# Patient Record
Sex: MALE | Race: WHITE | NOT HISPANIC OR LATINO | Employment: OTHER | URBAN - METROPOLITAN AREA
[De-identification: names, ages, dates, MRNs, and addresses within clinical notes are randomized per-mention and may not be internally consistent; named-entity substitution may affect disease eponyms.]

---

## 2017-02-20 ENCOUNTER — TRANSCRIBE ORDERS (OUTPATIENT)
Dept: ADMINISTRATIVE | Facility: HOSPITAL | Age: 62
End: 2017-02-20

## 2017-02-20 DIAGNOSIS — C45.7: Primary | ICD-10-CM

## 2017-06-09 ENCOUNTER — ALLSCRIPTS OFFICE VISIT (OUTPATIENT)
Dept: OTHER | Facility: OTHER | Age: 62
End: 2017-06-09

## 2017-07-05 ENCOUNTER — GENERIC CONVERSION - ENCOUNTER (OUTPATIENT)
Dept: OTHER | Facility: OTHER | Age: 62
End: 2017-07-05

## 2017-11-01 DIAGNOSIS — E11.9 TYPE 2 DIABETES MELLITUS WITHOUT COMPLICATIONS (HCC): ICD-10-CM

## 2017-11-07 ENCOUNTER — ALLSCRIPTS OFFICE VISIT (OUTPATIENT)
Dept: OTHER | Facility: OTHER | Age: 62
End: 2017-11-07

## 2018-01-12 VITALS
BODY MASS INDEX: 27.4 KG/M2 | HEART RATE: 81 BPM | OXYGEN SATURATION: 98 % | DIASTOLIC BLOOD PRESSURE: 84 MMHG | RESPIRATION RATE: 16 BRPM | SYSTOLIC BLOOD PRESSURE: 150 MMHG | WEIGHT: 185 LBS | HEIGHT: 69 IN

## 2018-01-15 VITALS
TEMPERATURE: 98.6 F | DIASTOLIC BLOOD PRESSURE: 80 MMHG | HEART RATE: 83 BPM | RESPIRATION RATE: 18 BRPM | BODY MASS INDEX: 27.87 KG/M2 | OXYGEN SATURATION: 96 % | SYSTOLIC BLOOD PRESSURE: 126 MMHG | WEIGHT: 186 LBS

## 2018-03-22 PROCEDURE — 3072F LOW RISK FOR RETINOPATHY: CPT | Performed by: INTERNAL MEDICINE

## 2018-05-07 DIAGNOSIS — E11.9 TYPE 2 DIABETES MELLITUS WITHOUT COMPLICATIONS (HCC): ICD-10-CM

## 2018-05-11 DIAGNOSIS — E78.5 HYPERLIPIDEMIA, UNSPECIFIED HYPERLIPIDEMIA TYPE: Primary | ICD-10-CM

## 2018-05-11 RX ORDER — LISINOPRIL 30 MG/1
1 TABLET ORAL DAILY
COMMUNITY
Start: 2014-07-23 | End: 2018-06-26 | Stop reason: SDUPTHER

## 2018-05-11 RX ORDER — DOXYCYCLINE HYCLATE 100 MG
1 TABLET ORAL 2 TIMES DAILY
COMMUNITY
Start: 2017-11-07 | End: 2018-06-12 | Stop reason: ALTCHOICE

## 2018-05-11 RX ORDER — AMLODIPINE BESYLATE 10 MG/1
0.5 TABLET ORAL 2 TIMES DAILY
COMMUNITY
Start: 2013-05-11 | End: 2018-07-05 | Stop reason: SDUPTHER

## 2018-05-11 RX ORDER — BIOTIN 1 MG
1 TABLET ORAL DAILY
COMMUNITY
Start: 2015-04-21

## 2018-05-11 RX ORDER — HYDROCHLOROTHIAZIDE 12.5 MG/1
1 CAPSULE, GELATIN COATED ORAL DAILY
COMMUNITY
Start: 2012-04-30 | End: 2018-06-26 | Stop reason: SDUPTHER

## 2018-05-11 RX ORDER — FENOFIBRATE 145 MG/1
1 TABLET, COATED ORAL DAILY
COMMUNITY
Start: 2012-02-07 | End: 2018-05-11 | Stop reason: SDUPTHER

## 2018-05-11 RX ORDER — CHLORAL HYDRATE 500 MG
3 CAPSULE ORAL DAILY
COMMUNITY

## 2018-05-11 RX ORDER — B-COMPLEX WITH VITAMIN C
1 TABLET ORAL DAILY
COMMUNITY

## 2018-05-11 RX ORDER — MULTIVITAMIN
1 TABLET ORAL DAILY
COMMUNITY

## 2018-05-13 RX ORDER — FENOFIBRATE 145 MG/1
145 TABLET, COATED ORAL DAILY
Qty: 30 TABLET | Refills: 5 | Status: SHIPPED | OUTPATIENT
Start: 2018-05-13 | End: 2019-01-08 | Stop reason: SDUPTHER

## 2018-05-14 ENCOUNTER — TELEPHONE (OUTPATIENT)
Dept: INTERNAL MEDICINE CLINIC | Facility: CLINIC | Age: 63
End: 2018-05-14

## 2018-05-14 NOTE — TELEPHONE ENCOUNTER
Patient is calling to speak with you  He wouldn't give any details and only wants to talk to you      Please advise

## 2018-05-14 NOTE — TELEPHONE ENCOUNTER
Attempted to call the pt , mailbox is full unable to leave a message ;  Shailesh Kelly please call pt and get the pt on the phone

## 2018-05-16 NOTE — TELEPHONE ENCOUNTER
Spoke to patient  He was inquiring on whether or not the Fenofibrate was filled  I advised that this prescription has been filled and it is ready at the pharmacy  [facility] confirmed

## 2018-06-12 ENCOUNTER — OFFICE VISIT (OUTPATIENT)
Dept: INTERNAL MEDICINE CLINIC | Facility: CLINIC | Age: 63
End: 2018-06-12
Payer: COMMERCIAL

## 2018-06-12 VITALS
HEART RATE: 79 BPM | SYSTOLIC BLOOD PRESSURE: 144 MMHG | HEIGHT: 69 IN | WEIGHT: 192 LBS | BODY MASS INDEX: 28.44 KG/M2 | OXYGEN SATURATION: 95 % | DIASTOLIC BLOOD PRESSURE: 90 MMHG | RESPIRATION RATE: 16 BRPM

## 2018-06-12 DIAGNOSIS — J30.2 SEASONAL ALLERGIC RHINITIS, UNSPECIFIED TRIGGER: ICD-10-CM

## 2018-06-12 DIAGNOSIS — E13.9 DIABETES 1.5, MANAGED AS TYPE 2 (HCC): ICD-10-CM

## 2018-06-12 DIAGNOSIS — Z11.59 ENCOUNTER FOR HEPATITIS C SCREENING TEST FOR LOW RISK PATIENT: Primary | ICD-10-CM

## 2018-06-12 DIAGNOSIS — I10 ESSENTIAL HYPERTENSION: ICD-10-CM

## 2018-06-12 DIAGNOSIS — E78.1 HYPERTRIGLYCERIDEMIA: ICD-10-CM

## 2018-06-12 PROCEDURE — 3008F BODY MASS INDEX DOCD: CPT | Performed by: INTERNAL MEDICINE

## 2018-06-12 PROCEDURE — 99214 OFFICE O/P EST MOD 30 MIN: CPT | Performed by: INTERNAL MEDICINE

## 2018-06-12 RX ORDER — FEXOFENADINE HCL 180 MG/1
180 TABLET ORAL DAILY
Refills: 0
Start: 2018-06-12 | End: 2021-01-11 | Stop reason: HOSPADM

## 2018-06-12 RX ORDER — LANOLIN ALCOHOL/MO/W.PET/CERES
CREAM (GRAM) TOPICAL DAILY
COMMUNITY

## 2018-06-12 RX ORDER — FEXOFENADINE HCL AND PSEUDOEPHEDRINE HCI 180; 240 MG/1; MG/1
1 TABLET, EXTENDED RELEASE ORAL DAILY
COMMUNITY
End: 2018-06-12 | Stop reason: ALTCHOICE

## 2018-06-12 NOTE — ASSESSMENT & PLAN NOTE
Fairly well controlled he will continue with Allegra 180 mg once daily and Rhinocort AQ    He will consider wearing a mask with mowing the lawn

## 2018-06-12 NOTE — ASSESSMENT & PLAN NOTE
Hypertension -fairly well controlled he does have white coat hypertension and his blood pressure does go up when he is in the office, I have counseled patient following healthy balance diet, I would like the patient reduce sodium, exercise routinely, I would like the patient continued the med current medical regiment and we will continue to monitor

## 2018-06-12 NOTE — ASSESSMENT & PLAN NOTE
Patient has had increase of the hemoglobin A1c now at 7 will start him on Glucophage 500 mg 1 tablet twice a day I have reviewed the risks benefits and side effects of the medication including lactic acidosis and that he does understand that needed to stop the medication if he has a CT scan or in a dehydration will situation  We will check comprehensive metabolic panel, hemoglobin A1c in 3 months  I have recommended that he cut down on carbohydrates and sweets he would like to hold off on dietitian consultation at this point time he has seen an eye doctor for examination  No results for input(s): POCGLU in the last 72 hours      Blood Sugar Average: Last 72 hrs:

## 2018-06-12 NOTE — PROGRESS NOTES
Assessment/Plan:    Diabetes 1 5, managed as type 2 ([facility] )  Patient has had increase of the hemoglobin A1c now at 7 will start him on Glucophage 500 mg 1 tablet twice a day I have reviewed the risks benefits and side effects of the medication including lactic acidosis and that he does understand that needed to stop the medication if he has a CT scan or in a dehydration will situation  We will check comprehensive metabolic panel, hemoglobin A1c in 3 months  I have recommended that he cut down on carbohydrates and sweets he would like to hold off on dietitian consultation at this point time he has seen an eye doctor for examination  No results for input(s): POCGLU in the last 72 hours  Blood Sugar Average: Last 72 hrs:        Seasonal allergic rhinitis  Fairly well controlled he will continue with Allegra 180 mg once daily and Rhinocort AQ  He will consider wearing a mask with mowing the lawn    Hypertension  Hypertension -fairly well controlled he does have white coat hypertension and his blood pressure does go up when he is in the office, I have counseled patient following healthy balance diet, I would like the patient reduce sodium, exercise routinely, I would like the patient continued the med current medical regiment and we will continue to monitor  Hypertriglyceridemia  Reduce carbohydrates and sweets, will continue monitor check lipid profile    Encounter for hepatitis C screening test for low risk patient  Counseled, will check hepatitis C antibody         Problem List Items Addressed This Visit     Diabetes 1 5, managed as type 2 ([facility] )     Patient has had increase of the hemoglobin A1c now at 7 will start him on Glucophage 500 mg 1 tablet twice a day I have reviewed the risks benefits and side effects of the medication including lactic acidosis and that he does understand that needed to stop the medication if he has a CT scan or in a dehydration will situation    We will check comprehensive metabolic panel, hemoglobin A1c in 3 months  I have recommended that he cut down on carbohydrates and sweets he would like to hold off on dietitian consultation at this point time he has seen an eye doctor for examination  No results for input(s): POCGLU in the last 72 hours  Blood Sugar Average: Last 72 hrs:             Relevant Medications    metFORMIN (GLUCOPHAGE) 500 mg tablet    Other Relevant Orders    Comprehensive metabolic panel    Hemoglobin A1C    Seasonal allergic rhinitis     Fairly well controlled he will continue with Allegra 180 mg once daily and Rhinocort AQ  He will consider wearing a mask with mowing the lawn         Relevant Medications    fexofenadine (ALLEGRA) 180 MG tablet    Encounter for hepatitis C screening test for low risk patient - Primary     Counseled, will check hepatitis C antibody         Relevant Orders    Hepatitis C antibody    Hypertriglyceridemia     Reduce carbohydrates and sweets, will continue monitor check lipid profile         Hypertension     Hypertension -fairly well controlled he does have white coat hypertension and his blood pressure does go up when he is in the office, I have counseled patient following healthy balance diet, I would like the patient reduce sodium, exercise routinely, I would like the patient continued the med current medical regiment and we will continue to monitor  Return to office 6  months  call if any problems  Subjective:      Patient ID: Neyda Galarza is a 65 y o  male  HPI 61-year old male coming in for a follow up visit regarding type 2 diabetes, allergies, hypertension, hypertriglyceridemia and screening for hepatitis-C; The patient reports me compliant taking medications without untoward side effects the  The patient is here to review his medical condition, update me on the medical condition and the patient reports me no hospitalizations and no ER visits    The patient does report me losing his father in February he reports me he has not been exercising since that point in time, he reports me increased sweets and carbs in the diet  At this point time he is willing to make change he is doing well emotionally  He plans to start going back to gym again  The following portions of the patient's history were reviewed and updated as appropriate: allergies, current medications, past family history, past medical history, past social history, past surgical history and problem list     Review of Systems   Constitutional: Negative for activity change, appetite change and unexpected weight change  HENT: Negative for dental problem and postnasal drip  Eyes: Positive for visual disturbance (Central vein occlusion patient is working with Ophthalmology he does report to me blurry vision left eye)  Respiratory: Negative for cough and shortness of breath  Cardiovascular: Negative for chest pain  Gastrointestinal: Negative for abdominal pain, diarrhea, nausea and vomiting  Neurological: Negative for dizziness, light-headedness and headaches  Hematological: Negative for adenopathy  Objective:      /90 (BP Location: Right arm, Patient Position: Sitting, Cuff Size: Standard)   Pulse 79   Resp 16   Ht 5' 9" (1 753 m)   Wt 87 1 kg (192 lb)   SpO2 95%   BMI 28 35 kg/m²          Physical Exam   Constitutional: He appears well-developed and well-nourished  No distress  HENT:   Head: Normocephalic and atraumatic  Right Ear: External ear normal    Left Ear: External ear normal    Mouth/Throat: Oropharynx is clear and moist    Eyes: Conjunctivae are normal  Pupils are equal, round, and reactive to light  Right eye exhibits no discharge  Left eye exhibits no discharge  No scleral icterus  Neck: Neck supple  Cardiovascular: Normal rate, regular rhythm and normal heart sounds  Exam reveals no gallop and no friction rub  No murmur heard  Pulmonary/Chest: No respiratory distress  He has no wheezes  He has no rales  Abdominal: Soft  Bowel sounds are normal  He exhibits no distension and no mass  There is no tenderness  There is no rebound and no guarding  Musculoskeletal: He exhibits no edema or deformity  Lymphadenopathy:     He has no cervical adenopathy  Neurological: He is alert  Skin: He is not diaphoretic  Psychiatric: He has a normal mood and affect

## 2018-06-26 DIAGNOSIS — I10 ESSENTIAL HYPERTENSION: Primary | ICD-10-CM

## 2018-06-26 PROCEDURE — 4010F ACE/ARB THERAPY RXD/TAKEN: CPT | Performed by: INTERNAL MEDICINE

## 2018-06-26 RX ORDER — HYDROCHLOROTHIAZIDE 12.5 MG/1
12.5 CAPSULE, GELATIN COATED ORAL DAILY
Qty: 90 CAPSULE | Refills: 1 | Status: SHIPPED | OUTPATIENT
Start: 2018-06-26 | End: 2019-01-17 | Stop reason: SDUPTHER

## 2018-06-26 RX ORDER — LISINOPRIL 30 MG/1
30 TABLET ORAL DAILY
Qty: 90 TABLET | Refills: 1 | Status: SHIPPED | OUTPATIENT
Start: 2018-06-26 | End: 2019-01-17 | Stop reason: SDUPTHER

## 2018-07-05 DIAGNOSIS — I10 ESSENTIAL HYPERTENSION: Primary | ICD-10-CM

## 2018-07-05 RX ORDER — AMLODIPINE BESYLATE 10 MG/1
5 TABLET ORAL 2 TIMES DAILY
Qty: 30 TABLET | Refills: 5 | Status: SHIPPED | OUTPATIENT
Start: 2018-07-05 | End: 2019-03-11 | Stop reason: SDUPTHER

## 2018-08-19 DIAGNOSIS — E13.9 DIABETES 1.5, MANAGED AS TYPE 2 (HCC): ICD-10-CM

## 2018-11-15 ENCOUNTER — TELEPHONE (OUTPATIENT)
Dept: INTERNAL MEDICINE CLINIC | Facility: CLINIC | Age: 63
End: 2018-11-15

## 2018-11-15 NOTE — TELEPHONE ENCOUNTER
The patient called our office stating that he just got out of the hospital on Saturday and he is having some soreness and a little bit of redness where they put the IV in  I told the pt to use moist heat on the area  If the area gets worse he should go to the ER  If it stays the same or a little worse he can call in the morning for an appt

## 2018-12-04 LAB — HBA1C MFR BLD HPLC: 6.6 %

## 2018-12-07 RX ORDER — CYCLOBENZAPRINE HCL 10 MG
10 TABLET ORAL 3 TIMES DAILY PRN
Refills: 0 | COMMUNITY
Start: 2018-11-10 | End: 2018-12-14 | Stop reason: ALTCHOICE

## 2018-12-07 RX ORDER — IBUPROFEN 800 MG/1
800 TABLET ORAL EVERY 8 HOURS PRN
Refills: 0 | COMMUNITY
Start: 2018-11-10

## 2018-12-14 ENCOUNTER — OFFICE VISIT (OUTPATIENT)
Dept: INTERNAL MEDICINE CLINIC | Facility: CLINIC | Age: 63
End: 2018-12-14
Payer: COMMERCIAL

## 2018-12-14 VITALS
HEART RATE: 86 BPM | SYSTOLIC BLOOD PRESSURE: 124 MMHG | TEMPERATURE: 98.2 F | HEIGHT: 69 IN | RESPIRATION RATE: 16 BRPM | DIASTOLIC BLOOD PRESSURE: 72 MMHG | OXYGEN SATURATION: 98 % | BODY MASS INDEX: 27.13 KG/M2 | WEIGHT: 183.2 LBS

## 2018-12-14 DIAGNOSIS — E78.5 HYPERLIPIDEMIA, UNSPECIFIED HYPERLIPIDEMIA TYPE: ICD-10-CM

## 2018-12-14 DIAGNOSIS — I10 ESSENTIAL HYPERTENSION: ICD-10-CM

## 2018-12-14 DIAGNOSIS — Z23 NEED FOR 23-POLYVALENT PNEUMOCOCCAL POLYSACCHARIDE VACCINE: ICD-10-CM

## 2018-12-14 DIAGNOSIS — E66.3 OVERWEIGHT (BMI 25.0-29.9): ICD-10-CM

## 2018-12-14 DIAGNOSIS — E11.9 TYPE 2 DIABETES MELLITUS WITHOUT COMPLICATION, WITHOUT LONG-TERM CURRENT USE OF INSULIN (HCC): Primary | ICD-10-CM

## 2018-12-14 PROCEDURE — 90732 PPSV23 VACC 2 YRS+ SUBQ/IM: CPT

## 2018-12-14 PROCEDURE — 99214 OFFICE O/P EST MOD 30 MIN: CPT | Performed by: INTERNAL MEDICINE

## 2018-12-14 PROCEDURE — 90471 IMMUNIZATION ADMIN: CPT

## 2018-12-14 NOTE — PROGRESS NOTES
Assessment/Plan:           Problem List Items Addressed This Visit        Endocrine    Type 2 diabetes mellitus without complication, without long-term current use of insulin ([facility] ) - Primary     Lab Results   Component Value Date    HGBA1C 6 6 12/04/2018       No results for input(s): POCGLU in the last 72 hours  Blood Sugar Average: Last 72 hrs:   I have counselled the pt to follow a healthy and balanced diet ,and recommend routine exercise  I will be ordering diabetic laboratories including comprehensive metabolic panel, hemoglobin A1c, urine microalbumin, lipid panel  Relevant Orders    Microalbumin,Urine    Comprehensive metabolic panel    Hemoglobin A1C    Lipid Panel with Direct LDL reflex    Microalbumin / creatinine urine ratio       Cardiovascular and Mediastinum    Hypertension     Hypertension - controlled, I have counseled patient following healthy balance diet, I would like the patient reduce sodium, exercise routinely, I would like the patient continued the med current medical regiment and we will continue to monitor  Other    Hyperlipidemia     Hyperlipidemia controlled continue with current medical regiment recommend a low-cholesterol diet and recommend routine exercise we will continue to monitor the progress  Need for 23-polyvalent pneumococcal polysaccharide vaccine    Relevant Orders    Pneumococcal polysaccharide vaccine 23-valent greater than or equal to 1yo subcutaneous/IM (Completed)    Overweight (BMI 25 0-29  9)     Improving he has made improvements in his diet but I do counseled to continue to follow healthy balance diet/reducing carbohydrates and sweets and routine exercise plans to start going back to gym  Return to office  6 months  call if any problems  Subjective:      Patient ID: Priscila Olivares is a 59 y o  male      HPI 60-year old male coming in for a follow up visit regarding type 2 diabetes, essential hypertension, hyperlipidemia, overweight; The patient reports me compliant taking medications without untoward side effects the  The patient is here to review his medical condition, update me on the medical condition and the patient reports me no hospitalizations and no ER visits  He is here to review his laboratories  He reports me significant changes in his diet with reduction of sweets and carbohydrates he has been able to lose weight he has also been watching his portions  He has been active with walking for hunting  He does report me working with Ophthalmology at [facility] regarding a central vein occlusion he has had injections in the eye  Got the flu shot  The following portions of the patient's history were reviewed and updated as appropriate: allergies, current medications, past family history, past medical history, past social history, past surgical history and problem list     Review of Systems   Constitutional: Negative for activity change, appetite change and unexpected weight change  HENT: Negative for congestion and postnasal drip  Eyes: Positive for visual disturbance (He is currently working within the eye doctor he was found to have a central vein occlusion)  Respiratory: Negative for cough and shortness of breath  Cardiovascular: Negative for chest pain  Gastrointestinal: Negative for abdominal pain, diarrhea, nausea and vomiting  Neurological: Negative for dizziness, light-headedness and headaches  Hematological: Negative for adenopathy  Objective:                  No Follow-up on file  Allergies   Allergen Reactions    Penicillins      Other reaction(s): Other (See Comments)  Unknown  Other reaction(s): Other (See Comments)  Unknown  Other reaction(s): Unknown  Category: Allergy;         Past Medical History:   Diagnosis Date    Adenocarcinoma of prostate [facility])     [id number]  LAST ASSESSED     Past Surgical History:   Procedure Laterality Date    COLON SURGERY      HERNIA REPAIR      SHOULDER SURGERY      TONSILLECTOMY      TONSILLECTOMY AND ADENOIDECTOMY       Current Outpatient Prescriptions on File Prior to Visit   Medication Sig Dispense Refill    amLODIPine (NORVASC) 10 mg tablet Take 0 5 tablets (5 mg total) by mouth 2 (two) times a day 30 tablet 5    aspirin 81 MG tablet Take 1 tablet by mouth daily      Cholecalciferol (VITAMIN D3) 1000 units CAPS Take 1 capsule by mouth daily      cyanocobalamin (VITAMIN B-12) 1,000 mcg tablet Take by mouth daily      fenofibrate (TRICOR) 145 mg tablet Take 1 tablet (145 mg total) by mouth daily 30 tablet 5    hydrochlorothiazide (MICROZIDE) 12 5 mg capsule Take 1 capsule (12 5 mg total) by mouth daily 90 capsule 1    lisinopril (ZESTRIL) 30 mg tablet Take 1 tablet (30 mg total) by mouth daily 90 tablet 1    metFORMIN (GLUCOPHAGE) 500 mg tablet TAKE 1 TABLET BY MOUTH TWICE A DAY WITH FOOD 30 tablet 4    Multiple Vitamin (MULTIVITAMIN) tablet Take 1 tablet by mouth daily      Omega-3 Fatty Acids (FISH OIL) 1,000 mg Take 3 capsules by mouth daily      VITAMIN B COMPLEX-C CAPS Take 1 capsule by mouth daily      budesonide (RHINOCORT ALLERGY) 32 MCG/ACT nasal spray 1 spray into each nostril daily      fexofenadine (ALLEGRA) 180 MG tablet Take 1 tablet (180 mg total) by mouth daily (Patient not taking: Reported on 12/14/2018 )  0    ibuprofen (MOTRIN) 800 mg tablet Take 800 mg by mouth every 8 (eight) hours as needed  0     No current facility-administered medications on file prior to visit  Family History   Problem Relation Age of Onset    Diabetes Father         MELLITUS     Social History     Social History    Marital status: /Civil Union     Spouse name: N/A    Number of children: N/A    Years of education: N/A     Occupational History    Not on file       Social History Main Topics    Smoking status: Current Every Day Smoker    Smokeless tobacco: Never Used    Alcohol use Yes      Comment: SOCIAL    Drug use: No    Sexual activity: Not on file     Other Topics Concern    Not on file     Social History Narrative    COPY OF ADVANCE DIRECTIVE OBTAINED     Vitals:    12/14/18 0924   BP: 124/72   Pulse: 86   Resp: 16   Temp: 98 2 °F (36 8 °C)   TempSrc: Tympanic   SpO2: 98%   Weight: 83 1 kg (183 lb 3 2 oz)   Height: 5' 9" (1 753 m)     Results for orders placed or performed in visit on 12/04/18   Hemoglobin A1C   Result Value Ref Range    Hemoglobin A1C 6 6      Weight (last 2 days)     Date/Time   Weight    12/14/18 0924  83 1 (183 2)            Body mass index is 27 05 kg/m²  BP      Temp      Pulse     Resp      SpO2        Vitals:    12/14/18 0924   Weight: 83 1 kg (183 lb 3 2 oz)     Vitals:    12/14/18 0924   Weight: 83 1 kg (183 lb 3 2 oz)         /72   Pulse 86   Temp 98 2 °F (36 8 °C) (Tympanic)   Resp 16   Ht 5' 9" (1 753 m)   Wt 83 1 kg (183 lb 3 2 oz)   SpO2 98%   BMI 27 05 kg/m²          Physical Exam   Constitutional: He appears well-developed and well-nourished  No distress  HENT:   Head: Normocephalic and atraumatic  Right Ear: External ear normal    Left Ear: External ear normal    Mouth/Throat: Oropharynx is clear and moist    Eyes: Pupils are equal, round, and reactive to light  Conjunctivae are normal  Right eye exhibits no discharge  Left eye exhibits no discharge  No scleral icterus  Neck: Neck supple  Cardiovascular: Normal rate, regular rhythm and normal heart sounds  Exam reveals no gallop and no friction rub  Pulses are no weak pulses  No murmur heard  Pulses:       Dorsalis pedis pulses are 2+ on the right side  Posterior tibial pulses are 2+ on the right side, and 2+ on the left side  Pulmonary/Chest: No respiratory distress  He has no wheezes  He has no rales  Abdominal: Soft  Bowel sounds are normal  He exhibits no distension and no mass  There is no tenderness  There is no rebound and no guarding  Musculoskeletal: He exhibits no edema or deformity     Feet: Right Foot:   Skin Integrity: Negative for ulcer, skin breakdown, erythema, warmth, callus or dry skin  Left Foot:   Skin Integrity: Negative for ulcer, skin breakdown, erythema, warmth, callus or dry skin  Lymphadenopathy:     He has no cervical adenopathy  Neurological: He is alert  Skin: He is not diaphoretic  Psychiatric: He has a normal mood and affect  Patient's shoes and socks removed  Right Foot/Ankle   Right Foot Inspection  Skin Exam: skin normal and skin intact no dry skin, no warmth, no callus, no erythema, no maceration, no abnormal color, no pre-ulcer, no ulcer and no callus                          Toe Exam: ROM and strength within normal limits  Sensory       Monofilament testing: intact  Vascular  Capillary refills: < 3 seconds  The right DP pulse is 2+  The right PT pulse is 2+  Left Foot/Ankle  Left Foot Inspection  Skin Exam: skin normal and skin intactno dry skin, no warmth, no erythema, no maceration, normal color, no pre-ulcer, no ulcer and no callus                         Toe Exam: ROM and strength within normal limits                   Sensory       Monofilament: intact  Vascular  Capillary refills: < 3 seconds  The left PT pulse is 2+  Assign Risk Category:  No deformity present; No loss of protective sensation;  No weak pulses       Risk: 0

## 2018-12-16 PROBLEM — E66.3 OVERWEIGHT (BMI 25.0-29.9): Status: ACTIVE | Noted: 2018-12-16

## 2018-12-16 PROBLEM — Z23 NEED FOR 23-POLYVALENT PNEUMOCOCCAL POLYSACCHARIDE VACCINE: Status: ACTIVE | Noted: 2018-12-16

## 2018-12-16 PROBLEM — E11.9 TYPE 2 DIABETES MELLITUS WITHOUT COMPLICATION, WITHOUT LONG-TERM CURRENT USE OF INSULIN (HCC): Status: ACTIVE | Noted: 2018-12-16

## 2018-12-16 NOTE — ASSESSMENT & PLAN NOTE
Lab Results   Component Value Date    HGBA1C 6 6 12/04/2018       No results for input(s): POCGLU in the last 72 hours  Blood Sugar Average: Last 72 hrs:   I have counselled the pt to follow a healthy and balanced diet ,and recommend routine exercise  I will be ordering diabetic laboratories including comprehensive metabolic panel, hemoglobin A1c, urine microalbumin, lipid panel

## 2018-12-16 NOTE — ASSESSMENT & PLAN NOTE
Improving he has made improvements in his diet but I do counseled to continue to follow healthy balance diet/reducing carbohydrates and sweets and routine exercise plans to start going back to gym

## 2018-12-18 DIAGNOSIS — E13.9 DIABETES 1.5, MANAGED AS TYPE 2 (HCC): ICD-10-CM

## 2018-12-21 DIAGNOSIS — E13.9 DIABETES 1.5, MANAGED AS TYPE 2 (HCC): ICD-10-CM

## 2019-01-08 DIAGNOSIS — E78.5 HYPERLIPIDEMIA, UNSPECIFIED HYPERLIPIDEMIA TYPE: ICD-10-CM

## 2019-01-08 RX ORDER — FENOFIBRATE 145 MG/1
TABLET, COATED ORAL
Qty: 30 TABLET | Refills: 5 | Status: SHIPPED | OUTPATIENT
Start: 2019-01-08 | End: 2019-09-17 | Stop reason: SDUPTHER

## 2019-01-17 DIAGNOSIS — I10 ESSENTIAL HYPERTENSION: ICD-10-CM

## 2019-01-17 RX ORDER — HYDROCHLOROTHIAZIDE 12.5 MG/1
CAPSULE, GELATIN COATED ORAL
Qty: 90 CAPSULE | Refills: 1 | Status: SHIPPED | OUTPATIENT
Start: 2019-01-17 | End: 2019-09-17 | Stop reason: SDUPTHER

## 2019-01-17 RX ORDER — LISINOPRIL 30 MG/1
TABLET ORAL
Qty: 90 TABLET | Refills: 1 | Status: SHIPPED | OUTPATIENT
Start: 2019-01-17 | End: 2019-09-17 | Stop reason: SDUPTHER

## 2019-03-11 DIAGNOSIS — I10 ESSENTIAL HYPERTENSION: ICD-10-CM

## 2019-03-11 RX ORDER — AMLODIPINE BESYLATE 10 MG/1
TABLET ORAL
Qty: 30 TABLET | Refills: 5 | Status: SHIPPED | OUTPATIENT
Start: 2019-03-11 | End: 2019-10-25 | Stop reason: SDUPTHER

## 2019-04-09 DIAGNOSIS — E13.9 DIABETES 1.5, MANAGED AS TYPE 2 (HCC): ICD-10-CM

## 2019-05-01 ENCOUNTER — OFFICE VISIT (OUTPATIENT)
Dept: URGENT CARE | Age: 64
End: 2019-05-01
Payer: COMMERCIAL

## 2019-05-01 VITALS
DIASTOLIC BLOOD PRESSURE: 79 MMHG | WEIGHT: 193 LBS | TEMPERATURE: 97.5 F | BODY MASS INDEX: 28.58 KG/M2 | HEIGHT: 69 IN | SYSTOLIC BLOOD PRESSURE: 144 MMHG | OXYGEN SATURATION: 96 % | HEART RATE: 91 BPM | RESPIRATION RATE: 16 BRPM

## 2019-05-01 DIAGNOSIS — R23.4 SCAB: Primary | ICD-10-CM

## 2019-05-01 PROCEDURE — S9083 URGENT CARE CENTER GLOBAL: HCPCS | Performed by: FAMILY MEDICINE

## 2019-05-01 PROCEDURE — G0381 LEV 2 HOSP TYPE B ED VISIT: HCPCS | Performed by: FAMILY MEDICINE

## 2019-05-30 ENCOUNTER — APPOINTMENT (OUTPATIENT)
Dept: LAB | Age: 64
End: 2019-05-30
Payer: COMMERCIAL

## 2019-05-30 ENCOUNTER — TRANSCRIBE ORDERS (OUTPATIENT)
Dept: ADMINISTRATIVE | Age: 64
End: 2019-05-30

## 2019-05-30 DIAGNOSIS — E11.9 TYPE 2 DIABETES MELLITUS WITHOUT COMPLICATION, WITHOUT LONG-TERM CURRENT USE OF INSULIN (HCC): ICD-10-CM

## 2019-05-30 DIAGNOSIS — E13.9 DIABETES 1.5, MANAGED AS TYPE 2 (HCC): ICD-10-CM

## 2019-05-30 LAB
ALBUMIN SERPL BCP-MCNC: 4 G/DL (ref 3.5–5)
ALP SERPL-CCNC: 85 U/L (ref 46–116)
ALT SERPL W P-5'-P-CCNC: 23 U/L (ref 12–78)
ANION GAP SERPL CALCULATED.3IONS-SCNC: 9 MMOL/L (ref 4–13)
AST SERPL W P-5'-P-CCNC: 11 U/L (ref 5–45)
BILIRUB SERPL-MCNC: 0.59 MG/DL (ref 0.2–1)
BUN SERPL-MCNC: 19 MG/DL (ref 5–25)
CALCIUM SERPL-MCNC: 8.9 MG/DL (ref 8.3–10.1)
CHLORIDE SERPL-SCNC: 109 MMOL/L (ref 100–108)
CHOLEST SERPL-MCNC: 159 MG/DL (ref 50–200)
CO2 SERPL-SCNC: 23 MMOL/L (ref 21–32)
CREAT SERPL-MCNC: 0.9 MG/DL (ref 0.6–1.3)
EST. AVERAGE GLUCOSE BLD GHB EST-MCNC: 134 MG/DL
GFR SERPL CREATININE-BSD FRML MDRD: 91 ML/MIN/1.73SQ M
GLUCOSE P FAST SERPL-MCNC: 102 MG/DL (ref 65–99)
HBA1C MFR BLD: 6.3 % (ref 4.2–6.3)
HDLC SERPL-MCNC: 34 MG/DL (ref 40–60)
LDLC SERPL CALC-MCNC: 85 MG/DL (ref 0–100)
POTASSIUM SERPL-SCNC: 4.1 MMOL/L (ref 3.5–5.3)
PROT SERPL-MCNC: 7.4 G/DL (ref 6.4–8.2)
SODIUM SERPL-SCNC: 141 MMOL/L (ref 136–145)
TRIGL SERPL-MCNC: 199 MG/DL

## 2019-05-30 PROCEDURE — 83036 HEMOGLOBIN GLYCOSYLATED A1C: CPT

## 2019-05-30 PROCEDURE — 80053 COMPREHEN METABOLIC PANEL: CPT

## 2019-05-30 PROCEDURE — 80061 LIPID PANEL: CPT

## 2019-05-30 PROCEDURE — 36415 COLL VENOUS BLD VENIPUNCTURE: CPT

## 2019-05-30 RX ORDER — CLINDAMYCIN HYDROCHLORIDE 300 MG/1
CAPSULE ORAL
COMMUNITY
Start: 2019-05-28 | End: 2021-01-09

## 2019-06-03 ENCOUNTER — OFFICE VISIT (OUTPATIENT)
Dept: INTERNAL MEDICINE CLINIC | Facility: CLINIC | Age: 64
End: 2019-06-03
Payer: COMMERCIAL

## 2019-06-03 VITALS
SYSTOLIC BLOOD PRESSURE: 134 MMHG | OXYGEN SATURATION: 97 % | WEIGHT: 182.2 LBS | HEART RATE: 86 BPM | BODY MASS INDEX: 26.98 KG/M2 | RESPIRATION RATE: 16 BRPM | DIASTOLIC BLOOD PRESSURE: 80 MMHG | HEIGHT: 69 IN

## 2019-06-03 DIAGNOSIS — E11.9 TYPE 2 DIABETES MELLITUS WITHOUT COMPLICATION, WITHOUT LONG-TERM CURRENT USE OF INSULIN (HCC): ICD-10-CM

## 2019-06-03 DIAGNOSIS — Z12.11 SCREENING FOR COLON CANCER: Primary | ICD-10-CM

## 2019-06-03 DIAGNOSIS — Z00.00 HEALTHCARE MAINTENANCE: ICD-10-CM

## 2019-06-03 DIAGNOSIS — E66.3 OVERWEIGHT (BMI 25.0-29.9): ICD-10-CM

## 2019-06-03 DIAGNOSIS — E78.5 HYPERLIPIDEMIA, UNSPECIFIED HYPERLIPIDEMIA TYPE: ICD-10-CM

## 2019-06-03 DIAGNOSIS — I10 ESSENTIAL HYPERTENSION: ICD-10-CM

## 2019-06-03 PROCEDURE — 3008F BODY MASS INDEX DOCD: CPT | Performed by: INTERNAL MEDICINE

## 2019-06-03 PROCEDURE — 3079F DIAST BP 80-89 MM HG: CPT | Performed by: INTERNAL MEDICINE

## 2019-06-03 PROCEDURE — 99214 OFFICE O/P EST MOD 30 MIN: CPT | Performed by: INTERNAL MEDICINE

## 2019-06-03 PROCEDURE — 3075F SYST BP GE 130 - 139MM HG: CPT | Performed by: INTERNAL MEDICINE

## 2019-06-19 LAB
LEFT EYE DIABETIC RETINOPATHY: NORMAL
RIGHT EYE DIABETIC RETINOPATHY: NORMAL

## 2019-06-19 PROCEDURE — 3072F LOW RISK FOR RETINOPATHY: CPT | Performed by: INTERNAL MEDICINE

## 2019-06-27 DIAGNOSIS — E13.9 DIABETES 1.5, MANAGED AS TYPE 2 (HCC): ICD-10-CM

## 2019-09-17 DIAGNOSIS — I10 ESSENTIAL HYPERTENSION: ICD-10-CM

## 2019-09-17 DIAGNOSIS — E78.5 HYPERLIPIDEMIA, UNSPECIFIED HYPERLIPIDEMIA TYPE: ICD-10-CM

## 2019-09-17 DIAGNOSIS — E13.9 DIABETES 1.5, MANAGED AS TYPE 2 (HCC): ICD-10-CM

## 2019-09-17 RX ORDER — FENOFIBRATE 145 MG/1
TABLET, COATED ORAL
Qty: 30 TABLET | Refills: 5 | Status: SHIPPED | OUTPATIENT
Start: 2019-09-17 | End: 2020-01-06 | Stop reason: CLARIF

## 2019-09-17 RX ORDER — HYDROCHLOROTHIAZIDE 12.5 MG/1
CAPSULE, GELATIN COATED ORAL
Qty: 30 CAPSULE | Refills: 5 | Status: SHIPPED | OUTPATIENT
Start: 2019-09-17 | End: 2020-09-23

## 2019-09-17 RX ORDER — LISINOPRIL 30 MG/1
TABLET ORAL
Qty: 30 TABLET | Refills: 5 | Status: SHIPPED | OUTPATIENT
Start: 2019-09-17 | End: 2020-09-23

## 2019-10-25 DIAGNOSIS — I10 ESSENTIAL HYPERTENSION: ICD-10-CM

## 2019-10-25 RX ORDER — AMLODIPINE BESYLATE 10 MG/1
TABLET ORAL
Qty: 30 TABLET | Refills: 5 | Status: SHIPPED | OUTPATIENT
Start: 2019-10-25 | End: 2020-01-06 | Stop reason: CLARIF

## 2019-12-01 DIAGNOSIS — E13.9 DIABETES 1.5, MANAGED AS TYPE 2 (HCC): ICD-10-CM

## 2019-12-09 ENCOUNTER — APPOINTMENT (OUTPATIENT)
Dept: LAB | Age: 64
End: 2019-12-09
Payer: COMMERCIAL

## 2019-12-09 ENCOUNTER — TRANSCRIBE ORDERS (OUTPATIENT)
Dept: ADMINISTRATIVE | Age: 64
End: 2019-12-09

## 2019-12-09 DIAGNOSIS — Z00.00 HEALTHCARE MAINTENANCE: ICD-10-CM

## 2019-12-09 DIAGNOSIS — E11.9 TYPE 2 DIABETES MELLITUS WITHOUT COMPLICATION, WITHOUT LONG-TERM CURRENT USE OF INSULIN (HCC): ICD-10-CM

## 2019-12-09 LAB
ALBUMIN SERPL BCP-MCNC: 4 G/DL (ref 3.5–5)
ALP SERPL-CCNC: 85 U/L (ref 46–116)
ALT SERPL W P-5'-P-CCNC: 26 U/L (ref 12–78)
ANION GAP SERPL CALCULATED.3IONS-SCNC: 6 MMOL/L (ref 4–13)
AST SERPL W P-5'-P-CCNC: 9 U/L (ref 5–45)
BILIRUB SERPL-MCNC: 0.41 MG/DL (ref 0.2–1)
BUN SERPL-MCNC: 17 MG/DL (ref 5–25)
CALCIUM SERPL-MCNC: 9.3 MG/DL (ref 8.3–10.1)
CHLORIDE SERPL-SCNC: 110 MMOL/L (ref 100–108)
CHOLEST SERPL-MCNC: 176 MG/DL (ref 50–200)
CO2 SERPL-SCNC: 23 MMOL/L (ref 21–32)
CREAT SERPL-MCNC: 0.89 MG/DL (ref 0.6–1.3)
CREAT UR-MCNC: 127 MG/DL
EST. AVERAGE GLUCOSE BLD GHB EST-MCNC: 148 MG/DL
GFR SERPL CREATININE-BSD FRML MDRD: 91 ML/MIN/1.73SQ M
GLUCOSE P FAST SERPL-MCNC: 105 MG/DL (ref 65–99)
HBA1C MFR BLD: 6.8 % (ref 4.2–6.3)
HDLC SERPL-MCNC: 37 MG/DL
LDLC SERPL CALC-MCNC: 111 MG/DL (ref 0–100)
MICROALBUMIN UR-MCNC: 20.9 MG/L (ref 0–20)
MICROALBUMIN/CREAT 24H UR: 16 MG/G CREATININE (ref 0–30)
POTASSIUM SERPL-SCNC: 4.1 MMOL/L (ref 3.5–5.3)
PROT SERPL-MCNC: 7.3 G/DL (ref 6.4–8.2)
RUBV IGG SERPL IA-ACNC: >175 IU/ML
SODIUM SERPL-SCNC: 139 MMOL/L (ref 136–145)
TRIGL SERPL-MCNC: 139 MG/DL

## 2019-12-09 PROCEDURE — 82570 ASSAY OF URINE CREATININE: CPT

## 2019-12-09 PROCEDURE — 82043 UR ALBUMIN QUANTITATIVE: CPT

## 2019-12-09 PROCEDURE — 36415 COLL VENOUS BLD VENIPUNCTURE: CPT

## 2019-12-09 PROCEDURE — 86735 MUMPS ANTIBODY: CPT

## 2019-12-09 PROCEDURE — 3061F NEG MICROALBUMINURIA REV: CPT | Performed by: INTERNAL MEDICINE

## 2019-12-09 PROCEDURE — 80061 LIPID PANEL: CPT

## 2019-12-09 PROCEDURE — 80053 COMPREHEN METABOLIC PANEL: CPT

## 2019-12-09 PROCEDURE — 83036 HEMOGLOBIN GLYCOSYLATED A1C: CPT

## 2019-12-09 PROCEDURE — 86762 RUBELLA ANTIBODY: CPT

## 2019-12-09 PROCEDURE — 86765 RUBEOLA ANTIBODY: CPT

## 2019-12-10 LAB
MEV IGG SER QL: NORMAL
MUV IGG SER QL: NORMAL

## 2019-12-17 ENCOUNTER — OFFICE VISIT (OUTPATIENT)
Dept: INTERNAL MEDICINE CLINIC | Facility: CLINIC | Age: 64
End: 2019-12-17
Payer: COMMERCIAL

## 2019-12-17 VITALS
HEART RATE: 86 BPM | SYSTOLIC BLOOD PRESSURE: 138 MMHG | RESPIRATION RATE: 14 BRPM | HEIGHT: 69 IN | WEIGHT: 184.8 LBS | OXYGEN SATURATION: 95 % | BODY MASS INDEX: 27.37 KG/M2 | TEMPERATURE: 98.1 F | DIASTOLIC BLOOD PRESSURE: 78 MMHG

## 2019-12-17 DIAGNOSIS — B35.3 TINEA PEDIS OF LEFT FOOT: ICD-10-CM

## 2019-12-17 DIAGNOSIS — I10 ESSENTIAL HYPERTENSION: ICD-10-CM

## 2019-12-17 DIAGNOSIS — E11.9 TYPE 2 DIABETES MELLITUS WITHOUT COMPLICATION, WITHOUT LONG-TERM CURRENT USE OF INSULIN (HCC): Primary | ICD-10-CM

## 2019-12-17 DIAGNOSIS — E78.5 HYPERLIPIDEMIA, UNSPECIFIED HYPERLIPIDEMIA TYPE: ICD-10-CM

## 2019-12-17 DIAGNOSIS — E78.1 HYPERTRIGLYCERIDEMIA: ICD-10-CM

## 2019-12-17 DIAGNOSIS — E66.3 OVERWEIGHT (BMI 25.0-29.9): ICD-10-CM

## 2019-12-17 PROCEDURE — 3078F DIAST BP <80 MM HG: CPT | Performed by: INTERNAL MEDICINE

## 2019-12-17 PROCEDURE — 3008F BODY MASS INDEX DOCD: CPT | Performed by: INTERNAL MEDICINE

## 2019-12-17 PROCEDURE — 3075F SYST BP GE 130 - 139MM HG: CPT | Performed by: INTERNAL MEDICINE

## 2019-12-17 PROCEDURE — 99214 OFFICE O/P EST MOD 30 MIN: CPT | Performed by: INTERNAL MEDICINE

## 2019-12-17 NOTE — PROGRESS NOTES
Assessment/Plan:    Type 2 diabetes mellitus without complication, without long-term current use of insulin ([facility])    Lab Results   Component Value Date    HGBA1C 6 8 (H) 12/09/2019   I have counselled the pt to follow a healthy and balanced diet ,and recommend routine exercise  Annual eye examination diabetic foot examination recommended    Essential hypertension  Hypertension - controlled, I have counseled patient following healthy balance diet, I would like the patient reduce sodium, exercise routinely, I would like the patient continued the med current medical regiment and we will continue to monitor  Tinea pedis of left foot  Econazole cream apply to affected area once a day times 10 days keep the area dry and clean    Hyperlipidemia  Hyperlipidemia controlled continue with current medical regiment recommend a low-cholesterol diet and recommend routine exercise we will continue to monitor the progress  Hypertriglyceridemia  Reduce carbohydrates/sweets continue monitor    Overweight (BMI 25 0-29  9)  I have counselled the pt to follow a healthy and balanced diet ,and recommend routine exercise  Problem List Items Addressed This Visit        Endocrine    Type 2 diabetes mellitus without complication, without long-term current use of insulin ([facility] ) - Primary       Lab Results   Component Value Date    HGBA1C 6 8 (H) 12/09/2019   I have counselled the pt to follow a healthy and balanced diet ,and recommend routine exercise    Annual eye examination diabetic foot examination recommended         Relevant Orders    Diabetic foot exam    Comprehensive metabolic panel    Hemoglobin A1C    Lipid Panel with Direct LDL reflex    Microalbumin / creatinine urine ratio       Cardiovascular and Mediastinum    Essential hypertension     Hypertension - controlled, I have counseled patient following healthy balance diet, I would like the patient reduce sodium, exercise routinely, I would like the patient continued the med current medical regiment and we will continue to monitor  Musculoskeletal and Integument    Tinea pedis of left foot     Econazole cream apply to affected area once a day times 10 days keep the area dry and clean         Relevant Medications    econazole nitrate 1 % cream       Other    Hyperlipidemia     Hyperlipidemia controlled continue with current medical regiment recommend a low-cholesterol diet and recommend routine exercise we will continue to monitor the progress  Overweight (BMI 25 0-29  9)     I have counselled the pt to follow a healthy and balanced diet ,and recommend routine exercise  Hypertriglyceridemia     Reduce carbohydrates/sweets continue monitor               Return to office 6  months  call if any problems  Subjective:      Patient ID: Cammy aWshington is a 62 y o  male  HPI 65-year old male coming in for a follow up visit regarding type 2 diabetes, tinea pedis left foot, hyperlipidemia, hypertension, overweight and hypertriglyceridemia; The patient reports me compliant taking medications without untoward side effects the  The patient is here to review his medical condition, update me on the medical condition and the patient reports me no hospitalizations and no ER visits  He continues to be active he is hunting this year  He is trying to follow healthy diet  He is here to review his laboratories  The following portions of the patient's history were reviewed and updated as appropriate: allergies, current medications, past family history, past medical history, past social history, past surgical history and problem list     Review of Systems   Constitutional: Negative for activity change, appetite change and unexpected weight change  HENT: Negative for congestion and postnasal drip  Eyes: Negative for visual disturbance  Respiratory: Negative for cough and shortness of breath  Cardiovascular: Negative for chest pain     Gastrointestinal: Negative for abdominal pain, diarrhea, nausea and vomiting  Neurological: Negative for dizziness, light-headedness and headaches  Hematological: Negative for adenopathy  Objective:    No follow-ups on file  No results found  Allergies   Allergen Reactions    Penicillins      Other reaction(s): Other (See Comments)  Unknown  Other reaction(s): Other (See Comments)  Unknown  Other reaction(s): Unknown  Category: Allergy;         Past Medical History:   Diagnosis Date    Adenocarcinoma of prostate [facility])     [id number]  LAST ASSESSED     Past Surgical History:   Procedure Laterality Date    COLON SURGERY      HERNIA REPAIR      SHOULDER SURGERY      TONSILLECTOMY      TONSILLECTOMY AND ADENOIDECTOMY       Current Outpatient Medications on File Prior to Visit   Medication Sig Dispense Refill    amLODIPine (NORVASC) 10 mg tablet TAKE 1/2 TABLET TWICE A DAY 30 tablet 5    budesonide (RHINOCORT ALLERGY) 32 MCG/ACT nasal spray 1 spray into each nostril daily      Cholecalciferol (VITAMIN D3) 1000 units CAPS Take 1 capsule by mouth daily      clindamycin (CLEOCIN) 300 MG capsule       cyanocobalamin (VITAMIN B-12) 1,000 mcg tablet Take by mouth daily      fenofibrate (TRICOR) 145 mg tablet TAKE 1 TABLET BY MOUTH EVERY DAY 30 tablet 5    fexofenadine (ALLEGRA) 180 MG tablet Take 1 tablet (180 mg total) by mouth daily  0    hydrochlorothiazide (MICROZIDE) 12 5 mg capsule TAKE 1 CAPSULE BY MOUTH EVERY DAY 30 capsule 5    ibuprofen (MOTRIN) 800 mg tablet Take 800 mg by mouth every 8 (eight) hours as needed  0    lisinopril (ZESTRIL) 30 mg tablet TAKE 1 TABLET BY MOUTH EVERY DAY 30 tablet 5    metFORMIN (GLUCOPHAGE) 500 mg tablet TAKE 1 TABLET BY MOUTH TWICE A DAY WITH MEALS 60 tablet 1    Multiple Vitamin (MULTIVITAMIN) tablet Take 1 tablet by mouth daily      Omega-3 Fatty Acids (FISH OIL) 1,000 mg Take 3 capsules by mouth daily      VITAMIN B COMPLEX-C CAPS Take 1 capsule by mouth daily No current facility-administered medications on file prior to visit        Family History   Problem Relation Age of Onset    Diabetes Father         MELLITUS     Social History     Socioeconomic History    Marital status: /Civil Union     Spouse name: Not on file    Number of children: Not on file    Years of education: Not on file    Highest education level: Not on file   Occupational History    Not on file   Social Needs    Financial resource strain: Not on file    Food insecurity:     Worry: Not on file     Inability: Not on file    Transportation needs:     Medical: Not on file     Non-medical: Not on file   Tobacco Use    Smoking status: Current Every Day Smoker    Smokeless tobacco: Never Used   Substance and Sexual Activity    Alcohol use: Yes     Comment: SOCIAL    Drug use: No    Sexual activity: Not on file   Lifestyle    Physical activity:     Days per week: Not on file     Minutes per session: Not on file    Stress: Not on file   Relationships    Social connections:     Talks on phone: Not on file     Gets together: Not on file     Attends Buddhism service: Not on file     Active member of club or organization: Not on file     Attends meetings of clubs or organizations: Not on file     Relationship status: Not on file    Intimate partner violence:     Fear of current or ex partner: Not on file     Emotionally abused: Not on file     Physically abused: Not on file     Forced sexual activity: Not on file   Other Topics Concern    Not on file   Social History Narrative    COPY OF ADVANCE DIRECTIVE OBTAINED     Vitals:    12/17/19 0747   BP: 138/78   Pulse: 86   Resp: 14   Temp: 98 1 °F (36 7 °C)   TempSrc: Oral   SpO2: 95%   Weight: 83 8 kg (184 lb 12 8 oz)   Height: 5' 9" (1 753 m)     Results for orders placed or performed in visit on 12/09/19   Comprehensive metabolic panel   Result Value Ref Range    Sodium 139 136 - 145 mmol/L    Potassium 4 1 3 5 - 5 3 mmol/L    Chloride 110 (H) 100 - 108 mmol/L    CO2 23 21 - 32 mmol/L    ANION GAP 6 4 - 13 mmol/L    BUN 17 5 - 25 mg/dL    Creatinine 0 89 0 60 - 1 30 mg/dL    Glucose, Fasting 105 (H) 65 - 99 mg/dL    Calcium 9 3 8 3 - 10 1 mg/dL    AST 9 5 - 45 U/L    ALT 26 12 - 78 U/L    Alkaline Phosphatase 85 46 - 116 U/L    Total Protein 7 3 6 4 - 8 2 g/dL    Albumin 4 0 3 5 - 5 0 g/dL    Total Bilirubin 0 41 0 20 - 1 00 mg/dL    eGFR 91 ml/min/1 73sq m   Hemoglobin A1C   Result Value Ref Range    Hemoglobin A1C 6 8 (H) 4 2 - 6 3 %     mg/dl   Lipid Panel with Direct LDL reflex   Result Value Ref Range    Cholesterol 176 50 - 200 mg/dL    Triglycerides 139 <=150 mg/dL    HDL, Direct 37 (L) >=40 mg/dL    LDL Calculated 111 (H) 0 - 100 mg/dL   Rubeola antibody IgG   Result Value Ref Range    Rubeola IgG IMMUNE IMMUNE   Mumps antibody, IgG   Result Value Ref Range    Mumps IgG IMMUNE IMMUNE   Rubella antibody, IgG   Result Value Ref Range    Rubella IgG Quant >175 0 >9 9 IU/mL     Weight (last 2 days)     Date/Time   Weight    12/17/19 0747   83 8 (184 8)            Body mass index is 27 29 kg/m²  BP      Temp      Pulse     Resp      SpO2        Vitals:    12/17/19 0747   Weight: 83 8 kg (184 lb 12 8 oz)     Vitals:    12/17/19 0747   Weight: 83 8 kg (184 lb 12 8 oz)       /78   Pulse 86   Temp 98 1 °F (36 7 °C) (Oral)   Resp 14   Ht 5' 9" (1 753 m)   Wt 83 8 kg (184 lb 12 8 oz)   SpO2 95%   BMI 27 29 kg/m²          Physical Exam   Constitutional: He appears well-developed and well-nourished  No distress  HENT:   Head: Normocephalic and atraumatic  Right Ear: External ear normal    Left Ear: External ear normal    Mouth/Throat: Oropharynx is clear and moist    Eyes: Pupils are equal, round, and reactive to light  Conjunctivae are normal  Right eye exhibits no discharge  Left eye exhibits no discharge  No scleral icterus  Neck: Neck supple  Cardiovascular: Normal rate, regular rhythm and normal heart sounds   Exam reveals no gallop and no friction rub  Pulses are no weak pulses  No murmur heard  Pulses:       Dorsalis pedis pulses are 2+ on the right side, and 2+ on the left side  Posterior tibial pulses are 2+ on the right side, and 2+ on the left side  Pulmonary/Chest: No respiratory distress  He has no wheezes  He has no rales  Abdominal: Soft  Bowel sounds are normal  He exhibits no distension and no mass  There is no tenderness  There is no rebound and no guarding  Musculoskeletal: He exhibits no edema or deformity  Feet:   Right Foot:   Skin Integrity: Negative for ulcer, skin breakdown, erythema, warmth, callus or dry skin  Left Foot:   Skin Integrity: Negative for ulcer, skin breakdown, erythema, warmth, callus or dry skin  Lymphadenopathy:     He has no cervical adenopathy  Neurological: He is alert  Skin: He is not diaphoretic  Psychiatric: He has a normal mood and affect  Diabetic Foot Exam    Patient's shoes and socks removed  Right Foot/Ankle   Right Foot Inspection  Skin Exam: skin normal and skin intact no dry skin, no warmth, no callus, no erythema, no maceration, no abnormal color, no pre-ulcer, no ulcer and no callus                          Toe Exam: ROM and strength within normal limits  Sensory       Monofilament testing: intact  Vascular    The right DP pulse is 2+  The right PT pulse is 2+  Left Foot/Ankle  Left Foot Inspection  Skin Exam: skin normal and skin intactno dry skin, no warmth, no erythema, no maceration, normal color, no pre-ulcer, no ulcer and no callus                         Toe Exam: ROM and strength within normal limits                   Sensory       Monofilament: intact  Vascular    The left DP pulse is 2+  The left PT pulse is 2+  Assign Risk Category:  No deformity present; No loss of protective sensation;  No weak pulses       Risk: 0

## 2019-12-18 NOTE — ASSESSMENT & PLAN NOTE
Lab Results   Component Value Date    HGBA1C 6 8 (H) 12/09/2019   I have counselled the pt to follow a healthy and balanced diet ,and recommend routine exercise    Annual eye examination diabetic foot examination recommended

## 2020-01-06 ENCOUNTER — DOCUMENTATION (OUTPATIENT)
Dept: INTERNAL MEDICINE CLINIC | Facility: CLINIC | Age: 65
End: 2020-01-06

## 2020-01-06 DIAGNOSIS — E11.9 TYPE 2 DIABETES MELLITUS WITHOUT COMPLICATION, WITHOUT LONG-TERM CURRENT USE OF INSULIN (HCC): Primary | ICD-10-CM

## 2020-01-06 DIAGNOSIS — I10 ESSENTIAL HYPERTENSION: ICD-10-CM

## 2020-01-06 DIAGNOSIS — E78.5 HYPERLIPIDEMIA, UNSPECIFIED HYPERLIPIDEMIA TYPE: ICD-10-CM

## 2020-01-06 RX ORDER — AMLODIPINE BESYLATE AND ATORVASTATIN CALCIUM 10; 20 MG/1; MG/1
1 TABLET, FILM COATED ORAL DAILY
Qty: 30 TABLET | Refills: 5 | Status: SHIPPED | OUTPATIENT
Start: 2020-01-06 | End: 2021-01-09

## 2020-01-06 NOTE — TELEPHONE ENCOUNTER
Per pharmacist appointment on 01/06/20, pending orders for signature/concurrence from Kristen Blake, [facility]

## 2020-01-06 NOTE — PROGRESS NOTES
[place], PharmD, BCACP      The following is per review of patient's pertinent medical/medication history and phone call with patient:    Reason for documentation: Per PCP request, patient's insurance formulary reviewed for statin rx per information received from [address] [place] [address]    Patient's insurance coverage: Payor: Jeanne Dalal / Plan: [employer] PLAN 361 / Product Type: Blue Fee for Service /     Findings: The 10-year ASCVD risk score (Fredy Gamble et al , 2013) is: 38 9%    Values used to calculate the score:      Age: 62 years      Sex: Male      Is Non- : No      Diabetic: Yes      Tobacco smoker: Yes      Systolic Blood Pressure: 703 mmHg      Is BP treated: Yes      HDL Cholesterol: 37 mg/dL      Total Cholesterol: 176 mg/dL    Previous statin trials: none    Current HLD Regimen: Fenofibrate    Recommendations: Given ASCVD risk, patient would benefit from at least mod intensity statin  Preferred combination statin option per patient's insurance is amlodipine/atorvastatin  If PCP is in agreeance, would recommend stopping fenofibrate if starting statin d/t increased risk for myalgias with concomitant use  Discussion with patient on risks/benefits of statin, patient agrees to trial of atorvastatin/amlodipine 20-10mg daily, would prefer 30-day vs 90-day supply  Agrees to update lipid panel prior to PCP appt in 6/2020 and will contact clinical pharmacist if any questions/concerns in the future  Demographics    Type of Intervention: New    Intervention(s) Made  Pharmacologic: Medication Discontinuation and Medication Initiation    Non-Pharmacologic: Adherence Addressed    Time Spent in Direct Patient Care Activities and Care Coordination: 16-30 Minutes    Recommendation(s) Accepted by the Patient/Caregiver:  All Accepted

## 2020-01-08 ENCOUNTER — DOCUMENTATION (OUTPATIENT)
Dept: INTERNAL MEDICINE CLINIC | Facility: CLINIC | Age: 65
End: 2020-01-08

## 2020-01-08 ENCOUNTER — TELEPHONE (OUTPATIENT)
Dept: INTERNAL MEDICINE CLINIC | Facility: CLINIC | Age: 65
End: 2020-01-08

## 2020-01-08 NOTE — TELEPHONE ENCOUNTER
Pt said he spoke to you the other day about his medications  He said he was taken off one medication and is now supposed to take a new one  He said this new one is more expensive and wants to know how they really compare and is it needed and worth it for him to be on this new medication  Please call patient to discuss

## 2020-01-08 NOTE — PROGRESS NOTES
[place], PharmD, BCACP      The following is per review of patient's pertinent medical/medication history and phone call with patient:    Patient's insurance coverage: Payor: BLUE CROSS / Plan: [employer] PLAN 361 / Product Type: Blue Fee for Service /     Subjective/Objective: Patient inquiring about difference in fenofibrate and atorvastatin  Reports slightly higher copay with combination    Recommendations: Discussion with patient on benefit of statin vs fibrate, patient voiced understanding  Pt would like to continue with combination rx vs  rx's at this time but agrees to contact PCP if he would like to separate rx's to lower copay costs  Demographics  Interaction Method: Phone  Type of Intervention: Follow-Up    Topic(s) Addressed  Statin Use    Intervention(s) Made      Non-Pharmacologic: Adherence Addressed    Tool(s) Used  Not Applicable    Time Spent in Direct Patient Care Activities and Care Coordination: 0-15 Minutes    Recommendation(s) Accepted by the Patient/Caregiver:  All Accepted

## 2020-02-06 DIAGNOSIS — E13.9 DIABETES 1.5, MANAGED AS TYPE 2 (HCC): ICD-10-CM

## 2020-02-08 ENCOUNTER — NURSE TRIAGE (OUTPATIENT)
Dept: OTHER | Facility: OTHER | Age: 65
End: 2020-02-08

## 2020-02-08 NOTE — TELEPHONE ENCOUNTER
Reason for Disposition   Caller requesting a NON-URGENT new prescription or refill and triager unable to refill per unit policy    Answer Assessment - Initial Assessment Questions  1  SYMPTOMS: "Do you have any symptoms?"      Denied  2  SEVERITY: If symptoms are present, ask "Are they mild, moderate or severe?"      Asymptomatic  Patient is calling because PCP changed his blood pressure medication and it is too expensive  Patient used to take amlodipine (Norvasc) 10 mg/tablet, one tablet, PO, BID and fenofibrate (Tricor) 145 mg/tablet, one tablet, PO, once a day  When he was given a refill on 1/6/2020, Dr Marino Rea changed him to amlodipine-atorvastatin, 10-20 mg, one tablet, PO, once a day  Patient went to  the prescription and the cost was $36 00  He said that his prescriptions used to cost about $17 00 total  He would like to change back to the previous prescription  Patient has enough Norvasc and Tricor to get through the weekend      Protocols used: [facility]

## 2020-02-08 NOTE — TELEPHONE ENCOUNTER
I called patient back @ [phone]  When I reviewed PMH, patient stated that he has never had prostate cancer, but his father has  I reviewed patients demographics and other history and this is the correct EMR  PLEASE REMOVE PROSTATE CANCER FROM PATIENT'S PMH

## 2020-02-08 NOTE — TELEPHONE ENCOUNTER
Regarding: doesn't know name, said it's a blue pill   most likely Metformin   he is not nice  ----- Message from Haleigh Ng sent at 2/8/2020 10:35 AM EST -----  "I just went to  a medication and it's $36 and not $17 like my old med; it is too much and I want it switched "

## 2020-02-10 ENCOUNTER — TELEPHONE (OUTPATIENT)
Dept: INTERNAL MEDICINE CLINIC | Facility: CLINIC | Age: 65
End: 2020-02-10

## 2020-02-10 DIAGNOSIS — I10 ESSENTIAL HYPERTENSION: Primary | ICD-10-CM

## 2020-02-10 RX ORDER — AMLODIPINE BESYLATE 10 MG/1
10 TABLET ORAL DAILY
Qty: 30 TABLET | Refills: 2 | Status: SHIPPED | OUTPATIENT
Start: 2020-02-10 | End: 2020-02-13 | Stop reason: SDUPTHER

## 2020-02-10 RX ORDER — ATORVASTATIN CALCIUM 20 MG/1
20 TABLET, FILM COATED ORAL DAILY
Qty: 30 TABLET | Refills: 2 | Status: SHIPPED | OUTPATIENT
Start: 2020-02-10 | End: 2020-02-14

## 2020-02-10 NOTE — TELEPHONE ENCOUNTER
Patient is asking to go back on amlodipine and fenofibrate  He no longer wants to take the combination medication  It costs too much money and he doesn't want to pay for it        Please advise

## 2020-02-10 NOTE — TELEPHONE ENCOUNTER
Patient did not want to be on the atorvastatin  He is requesting to go back on the fenofibrate      Please advise

## 2020-02-10 NOTE — TELEPHONE ENCOUNTER
Maybe he wants to talk to dr Rob Umaña then because they work in different ways   I'll forward to dr Rob Umaña

## 2020-02-11 DIAGNOSIS — I10 ESSENTIAL HYPERTENSION: ICD-10-CM

## 2020-02-11 RX ORDER — FENOFIBRATE 145 MG/1
145 TABLET, COATED ORAL DAILY
COMMUNITY
End: 2020-02-12 | Stop reason: SDUPTHER

## 2020-02-11 RX ORDER — AMLODIPINE BESYLATE 10 MG/1
10 TABLET ORAL 2 TIMES DAILY
COMMUNITY
End: 2020-02-11

## 2020-02-11 RX ORDER — FENOFIBRATE 145 MG/1
145 TABLET, COATED ORAL DAILY
Status: CANCELLED | OUTPATIENT
Start: 2020-02-11

## 2020-02-11 RX ORDER — AMLODIPINE BESYLATE 10 MG/1
10 TABLET ORAL DAILY
Qty: 30 TABLET | Refills: 2 | Status: CANCELLED | OUTPATIENT
Start: 2020-02-11

## 2020-02-12 DIAGNOSIS — E78.1 HYPERTRIGLYCERIDEMIA: Primary | ICD-10-CM

## 2020-02-12 RX ORDER — FENOFIBRATE 145 MG/1
145 TABLET, COATED ORAL DAILY
Qty: 30 TABLET | Refills: 5 | Status: SHIPPED | OUTPATIENT
Start: 2020-02-12 | End: 2020-09-23

## 2020-02-13 DIAGNOSIS — I10 ESSENTIAL HYPERTENSION: ICD-10-CM

## 2020-02-13 RX ORDER — AMLODIPINE BESYLATE 10 MG/1
10 TABLET ORAL DAILY
Qty: 30 TABLET | Refills: 0 | Status: SHIPPED | OUTPATIENT
Start: 2020-02-13 | End: 2020-03-19

## 2020-02-14 ENCOUNTER — TELEPHONE (OUTPATIENT)
Dept: INTERNAL MEDICINE CLINIC | Facility: CLINIC | Age: 65
End: 2020-02-14

## 2020-02-14 DIAGNOSIS — I10 ESSENTIAL HYPERTENSION: ICD-10-CM

## 2020-03-19 DIAGNOSIS — E13.9 DIABETES 1.5, MANAGED AS TYPE 2 (HCC): ICD-10-CM

## 2020-03-19 DIAGNOSIS — I10 ESSENTIAL HYPERTENSION: ICD-10-CM

## 2020-03-19 RX ORDER — AMLODIPINE BESYLATE 10 MG/1
TABLET ORAL
Qty: 30 TABLET | Refills: 0 | Status: SHIPPED | OUTPATIENT
Start: 2020-03-19 | End: 2020-09-23

## 2020-04-09 ENCOUNTER — TELEPHONE (OUTPATIENT)
Dept: INTERNAL MEDICINE CLINIC | Facility: CLINIC | Age: 65
End: 2020-04-09

## 2020-06-23 LAB
LEFT EYE DIABETIC RETINOPATHY: NORMAL
RIGHT EYE DIABETIC RETINOPATHY: NORMAL

## 2020-06-24 ENCOUNTER — APPOINTMENT (OUTPATIENT)
Dept: LAB | Age: 65
End: 2020-06-24
Payer: COMMERCIAL

## 2020-06-24 DIAGNOSIS — E11.9 TYPE 2 DIABETES MELLITUS WITHOUT COMPLICATION, WITHOUT LONG-TERM CURRENT USE OF INSULIN (HCC): ICD-10-CM

## 2020-06-24 LAB
ALBUMIN SERPL BCP-MCNC: 4.1 G/DL (ref 3.5–5)
ALP SERPL-CCNC: 78 U/L (ref 46–116)
ALT SERPL W P-5'-P-CCNC: 21 U/L (ref 12–78)
ANION GAP SERPL CALCULATED.3IONS-SCNC: 5 MMOL/L (ref 4–13)
AST SERPL W P-5'-P-CCNC: 8 U/L (ref 5–45)
BILIRUB SERPL-MCNC: 0.55 MG/DL (ref 0.2–1)
BUN SERPL-MCNC: 21 MG/DL (ref 5–25)
CALCIUM SERPL-MCNC: 10 MG/DL (ref 8.3–10.1)
CHLORIDE SERPL-SCNC: 109 MMOL/L (ref 100–108)
CHOLEST SERPL-MCNC: 156 MG/DL (ref 50–200)
CO2 SERPL-SCNC: 24 MMOL/L (ref 21–32)
CREAT SERPL-MCNC: 0.91 MG/DL (ref 0.6–1.3)
CREAT UR-MCNC: 93.2 MG/DL
EST. AVERAGE GLUCOSE BLD GHB EST-MCNC: 128 MG/DL
GFR SERPL CREATININE-BSD FRML MDRD: 89 ML/MIN/1.73SQ M
GLUCOSE P FAST SERPL-MCNC: 90 MG/DL (ref 65–99)
HBA1C MFR BLD: 6.1 %
HDLC SERPL-MCNC: 35 MG/DL
LDLC SERPL CALC-MCNC: 86 MG/DL (ref 0–100)
MICROALBUMIN UR-MCNC: 9.9 MG/L (ref 0–20)
MICROALBUMIN/CREAT 24H UR: 11 MG/G CREATININE (ref 0–30)
POTASSIUM SERPL-SCNC: 4.1 MMOL/L (ref 3.5–5.3)
PROT SERPL-MCNC: 7.8 G/DL (ref 6.4–8.2)
SODIUM SERPL-SCNC: 138 MMOL/L (ref 136–145)
TRIGL SERPL-MCNC: 175 MG/DL

## 2020-06-24 PROCEDURE — 83036 HEMOGLOBIN GLYCOSYLATED A1C: CPT

## 2020-06-24 PROCEDURE — 80061 LIPID PANEL: CPT

## 2020-06-24 PROCEDURE — 36415 COLL VENOUS BLD VENIPUNCTURE: CPT

## 2020-06-24 PROCEDURE — 82570 ASSAY OF URINE CREATININE: CPT

## 2020-06-24 PROCEDURE — 82043 UR ALBUMIN QUANTITATIVE: CPT

## 2020-06-24 PROCEDURE — 80053 COMPREHEN METABOLIC PANEL: CPT

## 2020-09-23 DIAGNOSIS — E13.9 DIABETES 1.5, MANAGED AS TYPE 2 (HCC): ICD-10-CM

## 2020-09-23 DIAGNOSIS — E78.1 HYPERTRIGLYCERIDEMIA: ICD-10-CM

## 2020-09-23 DIAGNOSIS — I10 ESSENTIAL HYPERTENSION: ICD-10-CM

## 2020-09-23 RX ORDER — FENOFIBRATE 145 MG/1
TABLET, COATED ORAL
Qty: 30 TABLET | Refills: 5 | Status: SHIPPED | OUTPATIENT
Start: 2020-09-23 | End: 2021-03-11

## 2020-09-23 RX ORDER — LISINOPRIL 30 MG/1
TABLET ORAL
Qty: 30 TABLET | Refills: 3 | Status: SHIPPED | OUTPATIENT
Start: 2020-09-23 | End: 2021-02-14

## 2020-09-23 RX ORDER — AMLODIPINE BESYLATE 10 MG/1
TABLET ORAL
Qty: 30 TABLET | Refills: 3 | Status: SHIPPED | OUTPATIENT
Start: 2020-09-23 | End: 2021-02-14

## 2020-09-23 RX ORDER — HYDROCHLOROTHIAZIDE 12.5 MG/1
CAPSULE, GELATIN COATED ORAL
Qty: 30 CAPSULE | Refills: 3 | Status: SHIPPED | OUTPATIENT
Start: 2020-09-23 | End: 2021-02-14

## 2020-12-02 ENCOUNTER — VBI (OUTPATIENT)
Dept: ADMINISTRATIVE | Facility: OTHER | Age: 65
End: 2020-12-02

## 2020-12-11 ENCOUNTER — TRANSCRIBE ORDERS (OUTPATIENT)
Dept: ADMINISTRATIVE | Age: 65
End: 2020-12-11

## 2020-12-11 ENCOUNTER — TELEPHONE (OUTPATIENT)
Dept: INTERNAL MEDICINE CLINIC | Facility: CLINIC | Age: 65
End: 2020-12-11

## 2020-12-11 DIAGNOSIS — E11.9 TYPE 2 DIABETES MELLITUS WITHOUT COMPLICATION, WITHOUT LONG-TERM CURRENT USE OF INSULIN (HCC): Primary | ICD-10-CM

## 2020-12-12 ENCOUNTER — LAB (OUTPATIENT)
Dept: LAB | Age: 65
End: 2020-12-12
Payer: COMMERCIAL

## 2020-12-12 DIAGNOSIS — E11.9 TYPE 2 DIABETES MELLITUS WITHOUT COMPLICATION, WITHOUT LONG-TERM CURRENT USE OF INSULIN (HCC): ICD-10-CM

## 2020-12-12 LAB
ALBUMIN SERPL BCP-MCNC: 4.1 G/DL (ref 3.5–5)
ALP SERPL-CCNC: 88 U/L (ref 46–116)
ALT SERPL W P-5'-P-CCNC: 25 U/L (ref 12–78)
ANION GAP SERPL CALCULATED.3IONS-SCNC: 7 MMOL/L (ref 4–13)
AST SERPL W P-5'-P-CCNC: 11 U/L (ref 5–45)
BILIRUB SERPL-MCNC: 0.45 MG/DL (ref 0.2–1)
BUN SERPL-MCNC: 21 MG/DL (ref 5–25)
CALCIUM SERPL-MCNC: 9.7 MG/DL (ref 8.3–10.1)
CHLORIDE SERPL-SCNC: 110 MMOL/L (ref 100–108)
CHOLEST SERPL-MCNC: 181 MG/DL (ref 50–200)
CO2 SERPL-SCNC: 25 MMOL/L (ref 21–32)
CREAT SERPL-MCNC: 0.8 MG/DL (ref 0.6–1.3)
CREAT UR-MCNC: 105 MG/DL
EST. AVERAGE GLUCOSE BLD GHB EST-MCNC: 131 MG/DL
GFR SERPL CREATININE-BSD FRML MDRD: 94 ML/MIN/1.73SQ M
GLUCOSE P FAST SERPL-MCNC: 100 MG/DL (ref 65–99)
HBA1C MFR BLD: 6.2 %
HDLC SERPL-MCNC: 42 MG/DL
LDLC SERPL CALC-MCNC: 106 MG/DL (ref 0–100)
MICROALBUMIN UR-MCNC: 16 MG/L (ref 0–20)
MICROALBUMIN/CREAT 24H UR: 15 MG/G CREATININE (ref 0–30)
POTASSIUM SERPL-SCNC: 4 MMOL/L (ref 3.5–5.3)
PROT SERPL-MCNC: 7.7 G/DL (ref 6.4–8.2)
SODIUM SERPL-SCNC: 142 MMOL/L (ref 136–145)
TRIGL SERPL-MCNC: 166 MG/DL

## 2020-12-12 PROCEDURE — 80061 LIPID PANEL: CPT

## 2020-12-12 PROCEDURE — 36415 COLL VENOUS BLD VENIPUNCTURE: CPT

## 2020-12-12 PROCEDURE — 82570 ASSAY OF URINE CREATININE: CPT | Performed by: INTERNAL MEDICINE

## 2020-12-12 PROCEDURE — 80053 COMPREHEN METABOLIC PANEL: CPT

## 2020-12-12 PROCEDURE — 82043 UR ALBUMIN QUANTITATIVE: CPT | Performed by: INTERNAL MEDICINE

## 2020-12-12 PROCEDURE — 83036 HEMOGLOBIN GLYCOSYLATED A1C: CPT

## 2021-01-09 ENCOUNTER — HOSPITAL ENCOUNTER (INPATIENT)
Facility: HOSPITAL | Age: 66
LOS: 2 days | Discharge: HOME | DRG: 192 | End: 2021-01-11
Attending: EMERGENCY MEDICINE | Admitting: INTERNAL MEDICINE
Payer: COMMERCIAL

## 2021-01-09 ENCOUNTER — APPOINTMENT (EMERGENCY)
Dept: RADIOLOGY | Facility: HOSPITAL | Age: 66
DRG: 192 | End: 2021-01-09
Payer: COMMERCIAL

## 2021-01-09 DIAGNOSIS — R07.89 CHEST WALL PAIN: ICD-10-CM

## 2021-01-09 DIAGNOSIS — R07.89 LEFT-SIDED CHEST WALL PAIN: Primary | ICD-10-CM

## 2021-01-09 DIAGNOSIS — R06.02 SHORTNESS OF BREATH: ICD-10-CM

## 2021-01-09 DIAGNOSIS — R09.02 HYPOXIA: ICD-10-CM

## 2021-01-09 DIAGNOSIS — J44.1 COPD WITH ACUTE EXACERBATION (HCC): ICD-10-CM

## 2021-01-09 PROBLEM — F17.200 NICOTINE DEPENDENCE: Status: ACTIVE | Noted: 2021-01-09

## 2021-01-09 LAB
ALBUMIN SERPL BCP-MCNC: 4.4 G/DL (ref 3.5–5)
ALP SERPL-CCNC: 99 U/L (ref 46–116)
ALT SERPL W P-5'-P-CCNC: 25 U/L (ref 12–78)
ANION GAP SERPL CALCULATED.3IONS-SCNC: 5 MMOL/L (ref 4–13)
AST SERPL W P-5'-P-CCNC: 11 U/L (ref 5–45)
ATRIAL RATE: 71 BPM
ATRIAL RATE: 75 BPM
BASOPHILS # BLD AUTO: 0.06 THOUSANDS/ΜL (ref 0–0.1)
BASOPHILS NFR BLD AUTO: 0 % (ref 0–1)
BILIRUB DIRECT SERPL-MCNC: 0.14 MG/DL (ref 0–0.2)
BILIRUB SERPL-MCNC: 0.46 MG/DL (ref 0.2–1)
BUN SERPL-MCNC: 23 MG/DL (ref 5–25)
CALCIUM SERPL-MCNC: 10.6 MG/DL (ref 8.3–10.1)
CHLORIDE SERPL-SCNC: 109 MMOL/L (ref 100–108)
CO2 SERPL-SCNC: 25 MMOL/L (ref 21–32)
CREAT SERPL-MCNC: 0.89 MG/DL (ref 0.6–1.3)
D DIMER PPP FEU-MCNC: 0.28 UG/ML FEU
EOSINOPHIL # BLD AUTO: 0.14 THOUSAND/ΜL (ref 0–0.61)
EOSINOPHIL NFR BLD AUTO: 1 % (ref 0–6)
ERYTHROCYTE [DISTWIDTH] IN BLOOD BY AUTOMATED COUNT: 14 % (ref 11.6–15.1)
FLUAV RNA RESP QL NAA+PROBE: NEGATIVE
FLUBV RNA RESP QL NAA+PROBE: NEGATIVE
GFR SERPL CREATININE-BSD FRML MDRD: 90 ML/MIN/1.73SQ M
GLUCOSE SERPL-MCNC: 116 MG/DL (ref 65–140)
GLUCOSE SERPL-MCNC: 117 MG/DL (ref 65–140)
GLUCOSE SERPL-MCNC: 151 MG/DL (ref 65–140)
GLUCOSE SERPL-MCNC: 253 MG/DL (ref 65–140)
HCT VFR BLD AUTO: 53 % (ref 36.5–49.3)
HGB BLD-MCNC: 17.2 G/DL (ref 12–17)
IMM GRANULOCYTES # BLD AUTO: 0.05 THOUSAND/UL (ref 0–0.2)
IMM GRANULOCYTES NFR BLD AUTO: 0 % (ref 0–2)
LYMPHOCYTES # BLD AUTO: 3.42 THOUSANDS/ΜL (ref 0.6–4.47)
LYMPHOCYTES NFR BLD AUTO: 24 % (ref 14–44)
MCH RBC QN AUTO: 28.4 PG (ref 26.8–34.3)
MCHC RBC AUTO-ENTMCNC: 32.5 G/DL (ref 31.4–37.4)
MCV RBC AUTO: 88 FL (ref 82–98)
MONOCYTES # BLD AUTO: 1.37 THOUSAND/ΜL (ref 0.17–1.22)
MONOCYTES NFR BLD AUTO: 9 % (ref 4–12)
NEUTROPHILS # BLD AUTO: 9.49 THOUSANDS/ΜL (ref 1.85–7.62)
NEUTS SEG NFR BLD AUTO: 66 % (ref 43–75)
NRBC BLD AUTO-RTO: 0 /100 WBCS
P AXIS: 47 DEGREES
P AXIS: 74 DEGREES
PLATELET # BLD AUTO: 366 THOUSANDS/UL (ref 149–390)
PMV BLD AUTO: 9 FL (ref 8.9–12.7)
POTASSIUM SERPL-SCNC: 4.2 MMOL/L (ref 3.5–5.3)
PR INTERVAL: 138 MS
PR INTERVAL: 176 MS
PROT SERPL-MCNC: 8.5 G/DL (ref 6.4–8.2)
QRS AXIS: 81 DEGREES
QRS AXIS: 89 DEGREES
QRSD INTERVAL: 100 MS
QRSD INTERVAL: 98 MS
QT INTERVAL: 360 MS
QT INTERVAL: 374 MS
QTC INTERVAL: 402 MS
QTC INTERVAL: 406 MS
RBC # BLD AUTO: 6.06 MILLION/UL (ref 3.88–5.62)
RSV RNA RESP QL NAA+PROBE: NEGATIVE
SARS-COV-2 RNA RESP QL NAA+PROBE: NEGATIVE
SODIUM SERPL-SCNC: 139 MMOL/L (ref 136–145)
T WAVE AXIS: 40 DEGREES
T WAVE AXIS: 53 DEGREES
TROPONIN I SERPL-MCNC: <0.02 NG/ML
TROPONIN I SERPL-MCNC: <0.02 NG/ML
VENTRICULAR RATE: 71 BPM
VENTRICULAR RATE: 75 BPM
WBC # BLD AUTO: 14.53 THOUSAND/UL (ref 4.31–10.16)

## 2021-01-09 PROCEDURE — 82948 REAGENT STRIP/BLOOD GLUCOSE: CPT

## 2021-01-09 PROCEDURE — 80076 HEPATIC FUNCTION PANEL: CPT | Performed by: EMERGENCY MEDICINE

## 2021-01-09 PROCEDURE — 0241U HB NFCT DS VIR RESP RNA 4 TRGT: CPT | Performed by: EMERGENCY MEDICINE

## 2021-01-09 PROCEDURE — G1004 CDSM NDSC: HCPCS

## 2021-01-09 PROCEDURE — 87205 SMEAR GRAM STAIN: CPT | Performed by: INTERNAL MEDICINE

## 2021-01-09 PROCEDURE — 71250 CT THORAX DX C-: CPT

## 2021-01-09 PROCEDURE — 36415 COLL VENOUS BLD VENIPUNCTURE: CPT | Performed by: EMERGENCY MEDICINE

## 2021-01-09 PROCEDURE — 94760 N-INVAS EAR/PLS OXIMETRY 1: CPT

## 2021-01-09 PROCEDURE — 85379 FIBRIN DEGRADATION QUANT: CPT | Performed by: EMERGENCY MEDICINE

## 2021-01-09 PROCEDURE — 96374 THER/PROPH/DIAG INJ IV PUSH: CPT

## 2021-01-09 PROCEDURE — 96375 TX/PRO/DX INJ NEW DRUG ADDON: CPT

## 2021-01-09 PROCEDURE — 93010 ELECTROCARDIOGRAM REPORT: CPT | Performed by: INTERNAL MEDICINE

## 2021-01-09 PROCEDURE — 80048 BASIC METABOLIC PNL TOTAL CA: CPT | Performed by: EMERGENCY MEDICINE

## 2021-01-09 PROCEDURE — 85025 COMPLETE CBC W/AUTO DIFF WBC: CPT | Performed by: EMERGENCY MEDICINE

## 2021-01-09 PROCEDURE — 87070 CULTURE OTHR SPECIMN AEROBIC: CPT | Performed by: INTERNAL MEDICINE

## 2021-01-09 PROCEDURE — 99285 EMERGENCY DEPT VISIT HI MDM: CPT | Performed by: EMERGENCY MEDICINE

## 2021-01-09 PROCEDURE — 84484 ASSAY OF TROPONIN QUANT: CPT | Performed by: EMERGENCY MEDICINE

## 2021-01-09 PROCEDURE — 99285 EMERGENCY DEPT VISIT HI MDM: CPT

## 2021-01-09 PROCEDURE — 99223 1ST HOSP IP/OBS HIGH 75: CPT | Performed by: INTERNAL MEDICINE

## 2021-01-09 PROCEDURE — 93005 ELECTROCARDIOGRAM TRACING: CPT

## 2021-01-09 RX ORDER — SODIUM CHLORIDE FOR INHALATION 0.9 %
3 VIAL, NEBULIZER (ML) INHALATION
Status: DISCONTINUED | OUTPATIENT
Start: 2021-01-09 | End: 2021-01-09

## 2021-01-09 RX ORDER — MORPHINE SULFATE 4 MG/ML
4 INJECTION, SOLUTION INTRAMUSCULAR; INTRAVENOUS ONCE
Status: COMPLETED | OUTPATIENT
Start: 2021-01-09 | End: 2021-01-09

## 2021-01-09 RX ORDER — AZITHROMYCIN 250 MG/1
500 TABLET, FILM COATED ORAL EVERY 24 HOURS
Status: DISCONTINUED | OUTPATIENT
Start: 2021-01-10 | End: 2021-01-11 | Stop reason: HOSPADM

## 2021-01-09 RX ORDER — AMLODIPINE BESYLATE 10 MG/1
10 TABLET ORAL DAILY
Status: DISCONTINUED | OUTPATIENT
Start: 2021-01-10 | End: 2021-01-11 | Stop reason: HOSPADM

## 2021-01-09 RX ORDER — ALBUTEROL SULFATE 90 UG/1
2 AEROSOL, METERED RESPIRATORY (INHALATION) EVERY 4 HOURS PRN
Status: DISCONTINUED | OUTPATIENT
Start: 2021-01-09 | End: 2021-01-11 | Stop reason: HOSPADM

## 2021-01-09 RX ORDER — METHYLPREDNISOLONE SODIUM SUCCINATE 40 MG/ML
40 INJECTION, POWDER, LYOPHILIZED, FOR SOLUTION INTRAMUSCULAR; INTRAVENOUS EVERY 8 HOURS
Status: DISCONTINUED | OUTPATIENT
Start: 2021-01-09 | End: 2021-01-11 | Stop reason: HOSPADM

## 2021-01-09 RX ORDER — KETOROLAC TROMETHAMINE 30 MG/ML
15 INJECTION, SOLUTION INTRAMUSCULAR; INTRAVENOUS ONCE
Status: COMPLETED | OUTPATIENT
Start: 2021-01-09 | End: 2021-01-09

## 2021-01-09 RX ORDER — GUAIFENESIN 100 MG/5ML
200 SOLUTION ORAL EVERY 4 HOURS PRN
Status: DISCONTINUED | OUTPATIENT
Start: 2021-01-09 | End: 2021-01-11 | Stop reason: HOSPADM

## 2021-01-09 RX ORDER — LEVALBUTEROL 1.25 MG/.5ML
1.25 SOLUTION, CONCENTRATE RESPIRATORY (INHALATION)
Status: DISCONTINUED | OUTPATIENT
Start: 2021-01-09 | End: 2021-01-09

## 2021-01-09 RX ORDER — ACETAMINOPHEN 325 MG/1
975 TABLET ORAL ONCE
Status: COMPLETED | OUTPATIENT
Start: 2021-01-09 | End: 2021-01-09

## 2021-01-09 RX ORDER — LORATADINE 10 MG/1
10 TABLET ORAL DAILY
Status: DISCONTINUED | OUTPATIENT
Start: 2021-01-10 | End: 2021-01-11 | Stop reason: HOSPADM

## 2021-01-09 RX ORDER — GUAIFENESIN 600 MG
600 TABLET, EXTENDED RELEASE 12 HR ORAL EVERY 12 HOURS SCHEDULED
Status: DISCONTINUED | OUTPATIENT
Start: 2021-01-09 | End: 2021-01-11 | Stop reason: HOSPADM

## 2021-01-09 RX ORDER — IBUPROFEN 400 MG/1
800 TABLET ORAL EVERY 8 HOURS PRN
Status: DISCONTINUED | OUTPATIENT
Start: 2021-01-09 | End: 2021-01-11 | Stop reason: HOSPADM

## 2021-01-09 RX ORDER — FENOFIBRATE 145 MG/1
145 TABLET, COATED ORAL DAILY
Status: DISCONTINUED | OUTPATIENT
Start: 2021-01-10 | End: 2021-01-11 | Stop reason: HOSPADM

## 2021-01-09 RX ORDER — NICOTINE 21 MG/24HR
1 PATCH, TRANSDERMAL 24 HOURS TRANSDERMAL DAILY
Status: DISCONTINUED | OUTPATIENT
Start: 2021-01-09 | End: 2021-01-11 | Stop reason: HOSPADM

## 2021-01-09 RX ORDER — HEPARIN SODIUM 5000 [USP'U]/ML
5000 INJECTION, SOLUTION INTRAVENOUS; SUBCUTANEOUS EVERY 8 HOURS SCHEDULED
Status: DISCONTINUED | OUTPATIENT
Start: 2021-01-09 | End: 2021-01-11 | Stop reason: HOSPADM

## 2021-01-09 RX ORDER — FORMOTEROL FUMARATE 20 UG/2ML
20 SOLUTION RESPIRATORY (INHALATION)
Status: DISCONTINUED | OUTPATIENT
Start: 2021-01-09 | End: 2021-01-09

## 2021-01-09 RX ORDER — HYDROCHLOROTHIAZIDE 12.5 MG/1
12.5 TABLET ORAL DAILY
Status: DISCONTINUED | OUTPATIENT
Start: 2021-01-10 | End: 2021-01-11 | Stop reason: HOSPADM

## 2021-01-09 RX ORDER — LIDOCAINE 50 MG/G
1 PATCH TOPICAL ONCE
Status: COMPLETED | OUTPATIENT
Start: 2021-01-09 | End: 2021-01-09

## 2021-01-09 RX ORDER — ACETAMINOPHEN 325 MG/1
650 TABLET ORAL EVERY 6 HOURS PRN
Status: DISCONTINUED | OUTPATIENT
Start: 2021-01-09 | End: 2021-01-11 | Stop reason: HOSPADM

## 2021-01-09 RX ADMIN — MORPHINE SULFATE 4 MG: 4 INJECTION INTRAVENOUS at 11:59

## 2021-01-09 RX ADMIN — METHYLPREDNISOLONE SODIUM SUCCINATE 40 MG: 40 INJECTION, POWDER, FOR SOLUTION INTRAMUSCULAR; INTRAVENOUS at 21:34

## 2021-01-09 RX ADMIN — INSULIN LISPRO 2 UNITS: 100 INJECTION, SOLUTION INTRAVENOUS; SUBCUTANEOUS at 21:58

## 2021-01-09 RX ADMIN — GUAIFENESIN 600 MG: 600 TABLET, EXTENDED RELEASE ORAL at 21:34

## 2021-01-09 RX ADMIN — KETOROLAC TROMETHAMINE 15 MG: 30 INJECTION, SOLUTION INTRAMUSCULAR at 09:13

## 2021-01-09 RX ADMIN — AZITHROMYCIN 500 MG: 500 INJECTION, POWDER, LYOPHILIZED, FOR SOLUTION INTRAVENOUS at 14:10

## 2021-01-09 RX ADMIN — Medication 1 PATCH: at 12:57

## 2021-01-09 RX ADMIN — GUAIFENESIN 200 MG: 100 SOLUTION ORAL at 12:57

## 2021-01-09 RX ADMIN — HEPARIN SODIUM 5000 UNITS: 5000 INJECTION INTRAVENOUS; SUBCUTANEOUS at 21:33

## 2021-01-09 RX ADMIN — GUAIFENESIN 200 MG: 100 SOLUTION ORAL at 21:33

## 2021-01-09 RX ADMIN — ACETAMINOPHEN 975 MG: 325 TABLET, FILM COATED ORAL at 09:13

## 2021-01-09 RX ADMIN — GUAIFENESIN 200 MG: 100 SOLUTION ORAL at 17:10

## 2021-01-09 RX ADMIN — IBUPROFEN 800 MG: 400 TABLET ORAL at 17:10

## 2021-01-09 RX ADMIN — CEFTRIAXONE SODIUM 1000 MG: 10 INJECTION, POWDER, FOR SOLUTION INTRAVENOUS at 12:57

## 2021-01-09 RX ADMIN — METHYLPREDNISOLONE SODIUM SUCCINATE 40 MG: 40 INJECTION, POWDER, FOR SOLUTION INTRAMUSCULAR; INTRAVENOUS at 12:58

## 2021-01-09 RX ADMIN — LIDOCAINE 1 PATCH: 50 PATCH TOPICAL at 09:13

## 2021-01-09 NOTE — ED PROVIDER NOTES
History  Chief Complaint   Patient presents with    Rib Pain     Pt has c/o L rib pain for a week  Pt states he was coughing from his allergies and heard a "pop"  67-year-old male history of prostate cancer, type 2 diabetes, hypertension, hyperlipidemia, approximately 30 pack-year smoking history continues to smoke approximately 1 pack per day presenting with left-sided chest wall pain  Patient reports approximately 1 week ago he had a profuse coughing fit and had acute onset of left chest wall pain after feeling and hearing a popping sensation and sound  Patient reports he has had mild difficulty breathing since that time but the pain continues to worsen is having slightly more difficulty breathing at this time  Patient reports pain is significantly worse with a cough and deep breath  He continues to have a cough that is worse from baseline productive of clear sputum  No COVID contact the patient is aware of  He denies any systemic symptoms such as fevers or chills or myalgias  Patient reports he leaves his home very rarely and only to primarily go to the grocery store for necessity  Denies any prior cardiac history other than hypertension and hyperlipidemia for which he takes medication  Denies any prior history of blood clots or any recent prolonged immobilization, recent surgeries, or estrogen containing medication  Denies any prior surgeries  Denies any additional complaints  He denies any headache, vision changes, abdominal pain, nausea/vomiting, fever/chills, or any bladder or bowel changes  Prior to Admission Medications   Prescriptions Last Dose Informant Patient Reported? Taking?    Cholecalciferol (VITAMIN D3) 1000 units CAPS Past Week at Unknown time  Yes Yes   Sig: Take 1 capsule by mouth daily   Multiple Vitamin (MULTIVITAMIN) tablet Past Week at Unknown time  Yes Yes   Sig: Take 1 tablet by mouth daily   Omega-3 Fatty Acids (FISH OIL) 1,000 mg Past Week at Unknown time  Yes Yes   Sig: Take 3 capsules by mouth daily   VITAMIN B COMPLEX-C CAPS Past Week at Unknown time  Yes Yes   Sig: Take 1 capsule by mouth daily   amLODIPine (NORVASC) 10 mg tablet Past Week at Unknown time  No Yes   Sig: TAKE 1 TABLET DAILY   budesonide (RHINOCORT ALLERGY) 32 MCG/ACT nasal spray Past Month at Unknown time  Yes Yes   Si spray into each nostril daily   cyanocobalamin (VITAMIN B-12) 1,000 mcg tablet Past Week at Unknown time  Yes Yes   Sig: Take by mouth daily   fenofibrate (TRICOR) 145 mg tablet Past Week at Unknown time  No Yes   Sig: TAKE 1 TABLET BY MOUTH EVERY DAY   fexofenadine (ALLEGRA) 180 MG tablet Not Taking at Unknown time  No No   Sig: Take 1 tablet (180 mg total) by mouth daily   Patient not taking: Reported on 1/10/2021   hydrochlorothiazide (MICROZIDE) 12 5 mg capsule Past Week at Unknown time  No Yes   Sig: TAKE 1 CAPSULE DAILY   ibuprofen (MOTRIN) 800 mg tablet Past Week at Unknown time  Yes Yes   Sig: Take 800 mg by mouth every 8 (eight) hours as needed   lisinopril (ZESTRIL) 30 mg tablet Past Week at Unknown time  No Yes   Sig: TAKE 1 TABLET DAILY   metFORMIN (GLUCOPHAGE) 500 mg tablet Past Week at Unknown time  No Yes   Sig: TAKE 1 TABLET BY MOUTH TWICE A DAY WIHT MEALS  Facility-Administered Medications: None       Past Medical History:   Diagnosis Date    Adenocarcinoma of prostate ([facility] [address] )     [id number]  LAST ASSESSED    [facility]     Hypertension        Past Surgical History:   Procedure Laterality Date    COLON SURGERY      HERNIA REPAIR      SHOULDER SURGERY      TONSILLECTOMY      TONSILLECTOMY AND ADENOIDECTOMY         Family History   Problem Relation Age of Onset    Diabetes Father         MELLITUS     I have reviewed and agree with the history as documented      E-Cigarette/Vaping    E-Cigarette Use Never User      E-Cigarette/Vaping Substances     Social History     Tobacco Use    Smoking status: Current Every Day Smoker     Packs/day: 1 00 Types: Cigarettes    Smokeless tobacco: Never Used   Substance Use Topics    Alcohol use: Not Currently     Comment: SOCIAL    Drug use: No        Review of Systems   Constitutional: Negative for appetite change, chills, diaphoresis, fever and unexpected weight change  HENT: Negative for congestion and rhinorrhea  Eyes: Negative for photophobia and visual disturbance  Respiratory: Positive for cough and shortness of breath  Negative for chest tightness  Cardiovascular: Negative for chest pain, palpitations and leg swelling  Gastrointestinal: Negative for abdominal distention, abdominal pain, blood in stool, constipation, diarrhea, nausea and vomiting  Genitourinary: Negative for dysuria and hematuria  Musculoskeletal: Negative for back pain, joint swelling, neck pain and neck stiffness  Skin: Negative for color change, pallor, rash and wound  Neurological: Negative for dizziness, syncope, weakness, light-headedness and headaches  Psychiatric/Behavioral: Negative for agitation  All other systems reviewed and are negative  Physical Exam  ED Triage Vitals [01/09/21 0805]   Temperature Pulse Respirations Blood Pressure SpO2   97 8 °F (36 6 °C) 85 18 159/97 96 %      Temp Source Heart Rate Source Patient Position - Orthostatic VS BP Location FiO2 (%)   Oral Monitor Sitting Left arm --      Pain Score       Worst Possible Pain             Orthostatic Vital Signs  Vitals:    01/10/21 0044 01/10/21 0824 01/10/21 1512 01/10/21 2210   BP: 146/92 147/92 145/91 133/71   Pulse: 87  88 78   Patient Position - Orthostatic VS:           Physical Exam  Constitutional:       General: He is not in acute distress  Appearance: He is well-developed  He is not diaphoretic  HENT:      Head: Normocephalic and atraumatic  Eyes:      Pupils: Pupils are equal, round, and reactive to light  Neck:      Musculoskeletal: Normal range of motion and neck supple  Vascular: No JVD     Cardiovascular: Rate and Rhythm: Normal rate and regular rhythm  Heart sounds: Normal heart sounds  No murmur  No friction rub  No gallop  Pulmonary:      Effort: Pulmonary effort is normal  No respiratory distress  Breath sounds: Wheezing present  No rhonchi or rales  Comments: Bibasilar decreased breath sounds and minimal and expiratory wheezes bilaterally  Breath sounds present bilaterally  Significant left-sided chest wall tenderness palpation  Chest:      Chest wall: Tenderness present  Abdominal:      General: Bowel sounds are normal  There is no distension  Palpations: Abdomen is soft  Tenderness: There is no abdominal tenderness  There is no guarding  Musculoskeletal: Normal range of motion  General: No swelling, tenderness or deformity  Comments: No calf swelling or tenderness bilaterally   Skin:     General: Skin is warm and dry  Findings: No erythema or rash  Neurological:      Mental Status: He is alert and oriented to person, place, and time  Sensory: No sensory deficit  Motor: No abnormal muscle tone           ED Medications  Medications   insulin lispro (HumaLOG) 100 units/mL subcutaneous injection 1-6 Units (1 Units Subcutaneous Given 1/10/21 1727)   insulin lispro (HumaLOG) 100 units/mL subcutaneous injection 1-5 Units (2 Units Subcutaneous Given 1/10/21 2200)   nicotine (NICODERM CQ) 14 mg/24hr TD 24 hr patch 1 patch (1 patch Transdermal Medication Applied 1/10/21 0827)   acetaminophen (TYLENOL) tablet 650 mg (650 mg Oral Given 1/11/21 0625)   heparin (porcine) subcutaneous injection 5,000 Units (5,000 Units Subcutaneous Given 1/11/21 0524)   guaiFENesin (MUCINEX) 12 hr tablet 600 mg (600 mg Oral Given 1/10/21 2159)   guaiFENesin (ROBITUSSIN) oral solution 200 mg (200 mg Oral Given 1/11/21 0524)   methylPREDNISolone sodium succinate (Solu-MEDROL) injection 40 mg (40 mg Intravenous Given 1/11/21 0524)   azithromycin (ZITHROMAX) tablet 500 mg (500 mg Oral Given 1/11/21 0625)   amLODIPine (NORVASC) tablet 10 mg (10 mg Oral Given 1/10/21 0829)   fenofibrate (TRICOR) tablet 145 mg (145 mg Oral Given 1/10/21 0830)   loratadine (CLARITIN) tablet 10 mg (10 mg Oral Given 1/10/21 0822)   ibuprofen (MOTRIN) tablet 800 mg (800 mg Oral Given 1/10/21 0821)   lisinopril (ZESTRIL) tablet 30 mg (30 mg Oral Given 1/10/21 0829)   hydrochlorothiazide (HYDRODIURIL) tablet 12 5 mg (12 5 mg Oral Given 1/10/21 0829)   albuterol (PROVENTIL HFA,VENTOLIN HFA) inhaler 2 puff (has no administration in time range)   ipratropium-albuterol (DUO-NEB) 0 5-2 5 mg/3 mL inhalation solution 3 mL (has no administration in time range)   insulin glargine (LANTUS) subcutaneous injection 5 Units 0 05 mL (5 Units Subcutaneous Given 1/10/21 2159)   acetaminophen (TYLENOL) tablet 975 mg (975 mg Oral Given 1/9/21 0913)   ketorolac (TORADOL) injection 15 mg (15 mg Intravenous Given 1/9/21 0913)   lidocaine (LIDODERM) 5 % patch 1 patch (1 patch Topical Patch Removed 1/9/21 2142)   morphine (PF) 4 mg/mL injection 4 mg (4 mg Intravenous Given 1/9/21 1159)   ceftriaxone (ROCEPHIN) 1 g/50 mL in dextrose IVPB (0 mg Intravenous Stopped 1/9/21 1418)   azithromycin (ZITHROMAX) 500 mg in sodium chloride 0 9% 250mL IVPB 500 mg (0 mg Intravenous Stopped 1/9/21 1510)   magnesium sulfate 2 g/50 mL IVPB (premix) 2 g (2 g Intravenous New Bag 1/10/21 2258)       Diagnostic Studies  Results Reviewed     Procedure Component Value Units Date/Time    Sputum culture and Gram stain [[id number]]  (Abnormal) Collected: 01/09/21 1936    Lab Status: Preliminary result Specimen: Expectorated Sputum Updated: 01/10/21 1422     Sputum Culture Culture too young- will reincubate     Gram Stain Result 1+ Epithelial cells per low power field      No polys seen      1+ Gram positive cocci in pairs      1+ Gram negative rods    Basic metabolic panel [[id number]]  (Abnormal) Collected: 01/10/21 0709    Lab Status: Final result Specimen: Blood from Arm, Right Updated: 01/10/21 0821     Sodium 137 mmol/L      Potassium 4 3 mmol/L      Chloride 108 mmol/L      CO2 23 mmol/L      ANION GAP 6 mmol/L      BUN 24 mg/dL      Creatinine 0 95 mg/dL      Glucose 190 mg/dL      Calcium 10 4 mg/dL      eGFR 84 ml/min/1 73sq m     Narrative:      [place] guidelines for Chronic Kidney Disease (CKD):     Stage 1 with normal or high GFR (GFR > 90 mL/min/1 73 square meters)    Stage 2 Mild CKD (GFR = 60-89 mL/min/1 73 square meters)    Stage 3A Moderate CKD (GFR = 45-59 mL/min/1 73 square meters)    Stage 3B Moderate CKD (GFR = 30-44 mL/min/1 73 square meters)    Stage 4 Severe CKD (GFR = 15-29 mL/min/1 73 square meters)    Stage 5 End Stage CKD (GFR <15 mL/min/1 73 square meters)  Note: GFR calculation is accurate only with a steady state creatinine    CBC and differential [[id number]]  (Abnormal) Collected: 01/10/21 0709    Lab Status: Final result Specimen: Blood from Arm, Right Updated: 01/10/21 0755     WBC 15 11 Thousand/uL      RBC 5 64 Million/uL      Hemoglobin 16 4 g/dL      Hematocrit 49 8 %      MCV 88 fL      MCH 29 1 pg      MCHC 32 9 g/dL      RDW 14 3 %      MPV 9 2 fL      Platelets 950 Thousands/uL      nRBC 0 /100 WBCs      Neutrophils Relative 84 %      Immat GRANS % 0 %      Lymphocytes Relative 13 %      Monocytes Relative 3 %      Eosinophils Relative 0 %      Basophils Relative 0 %      Neutrophils Absolute 12 58 Thousands/µL      Immature Grans Absolute 0 06 Thousand/uL      Lymphocytes Absolute 1 98 Thousands/µL      Monocytes Absolute 0 47 Thousand/µL      Eosinophils Absolute 0 00 Thousand/µL      Basophils Absolute 0 02 Thousands/µL     Fingerstick Glucose (POCT) [[id number]]  (Normal) Collected: 01/09/21 1256    Lab Status: Final result Updated: 01/09/21 1309     POC Glucose 116 mg/dl     Troponin I [[id number]]  (Normal) Collected: 01/09/21 1201    Lab Status: Final result Specimen: Blood from Arm, Left Updated: 01/09/21 1238     Troponin I <0 02 ng/mL     Platelet count [[id number]]     Lab Status: No result Specimen: Blood     Hepatic function panel [[id number]]  (Abnormal) Collected: 01/09/21 0905    Lab Status: Final result Specimen: Blood from Arm, Left Updated: 01/09/21 1055     Total Bilirubin 0 46 mg/dL      Bilirubin, Direct 0 14 mg/dL      Alkaline Phosphatase 99 U/L      AST 11 U/L      ALT 25 U/L      Total Protein 8 5 g/dL      Albumin 4 4 g/dL     COVID19, Influenza A/B, RSV PCR, [facility] [[id number]]  (Normal) Collected: 01/09/21 0905    Lab Status: Final result Specimen: Nares from Nose Updated: 01/09/21 1010     SARS-CoV-2 Negative     INFLUENZA A PCR Negative     INFLUENZA B PCR Negative     RSV PCR Negative    Narrative: This test has been authorized by FDA under an EUA (Emergency Use Assay) for use by authorized laboratories  Clinical caution and judgement should be used with the interpretation of these results with consideration of the clinical impression and other laboratory testing  Testing reported as "Positive" or "Negative" has been proven to be accurate according to standard laboratory validation requirements  All testing is performed with control materials showing appropriate reactivity at standard intervals      Troponin I [[id number]]  (Normal) Collected: 01/09/21 0905    Lab Status: Final result Specimen: Blood from Arm, Left Updated: 01/09/21 0937     Troponin I <0 02 ng/mL     D-Dimer [[id number]]  (Normal) Collected: 01/09/21 0905    Lab Status: Final result Specimen: Blood from Arm, Left Updated: 01/09/21 0934     D-Dimer, Quant 0 28 ug/ml FEU     Basic metabolic panel [[id number]]  (Abnormal) Collected: 01/09/21 0905    Lab Status: Final result Specimen: Blood from Arm, Left Updated: 01/09/21 0934     Sodium 139 mmol/L      Potassium 4 2 mmol/L      Chloride 109 mmol/L      CO2 25 mmol/L      ANION GAP 5 mmol/L      BUN 23 mg/dL      Creatinine 0 89 mg/dL      Glucose 117 mg/dL Calcium 10 6 mg/dL      eGFR 90 ml/min/1 73sq m     Narrative:      National Kid[employer] Disease Foundation guidelines for Chronic Kidney Disease (CKD):     Stage 1 with normal or high GFR (GFR > 90 mL/min/1 73 square meters)    Stage 2 Mild CKD (GFR = 60-89 mL/min/1 73 square meters)    Stage 3A Moderate CKD (GFR = 45-59 mL/min/1 73 square meters)    Stage 3B Moderate CKD (GFR = 30-44 mL/min/1 73 square meters)    Stage 4 Severe CKD (GFR = 15-29 mL/min/1 73 square meters)    Stage 5 End Stage CKD (GFR <15 mL/min/1 73 square meters)  Note: GFR calculation is accurate only with a steady state creatinine    CBC and differential [[id number]]  (Abnormal) Collected: 01/09/21 0905    Lab Status: Final result Specimen: Blood from Arm, Left Updated: 01/09/21 0918     WBC 14 53 Thousand/uL      RBC 6 06 Million/uL      Hemoglobin 17 2 g/dL      Hematocrit 53 0 %      MCV 88 fL      MCH 28 4 pg      MCHC 32 5 g/dL      RDW 14 0 %      MPV 9 0 fL      Platelets 156 Thousands/uL      nRBC 0 /100 WBCs      Neutrophils Relative 66 %      Immat GRANS % 0 %      Lymphocytes Relative 24 %      Monocytes Relative 9 %      Eosinophils Relative 1 %      Basophils Relative 0 %      Neutrophils Absolute 9 49 Thousands/µL      Immature Grans Absolute 0 05 Thousand/uL      Lymphocytes Absolute 3 42 Thousands/µL      Monocytes Absolute 1 37 Thousand/µL      Eosinophils Absolute 0 14 Thousand/µL      Basophils Absolute 0 06 Thousands/µL                  CT chest without contrast   Final Result by Dmitriy Johnson MD (01/09 1013)      Atelectatic changes at the bases  No evidence of rib fracture or pneumothorax  Hyperdense lesions involving the left kidney with the largest measuring 1 8 cm  We will attempt to obtain prior studies otherwise further evaluation with nonemergent ultrasound or renal protocol CT may be useful  The study was marked in EPIC for significant notification                 Workstation performed: [id number] Procedures  ECG 12 Lead Documentation Only    Date/Time: 1/9/2021 8:47 AM  Performed by: Kathy Littlejohn MD  Authorized by: Kathy Littlejohn MD     Indications / Diagnosis:  CP  ECG reviewed by me, the ED Provider: yes    Patient location:  ED  Previous ECG:     Previous ECG:  Compared to current    Similarity:  No change    Comparison to cardiac monitor: Yes    Interpretation:     Interpretation: non-specific    Rate:     ECG rate:  75    ECG rate assessment: normal    Rhythm:     Rhythm: sinus rhythm    Ectopy:     Ectopy: none    QRS:     QRS axis:  Normal    QRS intervals:  Normal  Conduction:     Conduction: normal    ST segments:     ST segments:  Normal  T waves:     T waves: non-specific            ED Course             HEART Risk Score      Most Recent Value   Heart Score Risk Calculator   History  0 Filed at: 01/09/2021 0930   ECG  1 Filed at: 01/09/2021 0930   Age  1 Filed at: 01/09/2021 0930   Risk Factors  2 Filed at: 01/09/2021 0930   Troponin  0 Filed at: 01/09/2021 0930   HEART Score  4 Filed at: 01/09/2021 0930              PERC Rule for PE      Most Recent Value   PERC Rule for PE   Age >=50  1 Filed at: 01/09/2021 0830   HR >=100  0 Filed at: 01/09/2021 0830   O2 Sat on room air < 95%  0 Filed at: 01/09/2021 0830   History of PE or DVT  0 Filed at: 01/09/2021 0830   Recent trauma or surgery  0 Filed at: 01/09/2021 0830   Hemoptysis  0 Filed at: 01/09/2021 0830   Exogenous estrogen  0 Filed at: 01/09/2021 0830   Unilateral leg swelling  0 Filed at: 01/09/2021 0830   PERC Rule for PE Results  1 Filed at: 01/09/2021 0830              SBIRT 22yo+      Most Recent Value   SBIRT (23 yo +)   In order to provide better care to our patients, we are screening all of our patients for alcohol and drug use  Would it be okay to ask you these screening questions? Yes Filed at: 01/09/2021 0810   Initial Alcohol Screen: US AUDIT-C    1   How often do you have a drink containing alcohol?  0 Filed at: 01/09/2021 0810   2  How many drinks containing alcohol do you have on a typical day you are drinking? 0 Filed at: 01/09/2021 0810   3a  Male UNDER 65: How often do you have five or more drinks on one occasion? 0 Filed at: 01/09/2021 0810   Audit-C Score  0 Filed at: 01/09/2021 4732   MORENO: How many times in the past year have you    Used an illegal drug or used a prescription medication for non-medical reasons? Never Filed at: 01/09/2021 7229          Wells' Criteria for PE      Most Recent Value   Wells' Criteria for PE   Clinical signs and symptoms of DVT  0 Filed at: 01/09/2021 0830   PE is primary diagnosis or equally likely  0 Filed at: 01/09/2021 0830   HR >100  0 Filed at: 01/09/2021 0830   Immobilization at least 3 days or Surgery in the previous 4 weeks  0 Filed at: 01/09/2021 0830   Previous, objectively diagnosed PE or DVT  0 Filed at: 01/09/2021 0830   Hemoptysis  0 Filed at: 01/09/2021 0830   Malignancy with treatment within 6 months or palliative  0 Filed at: 01/09/2021 0830   Wells' Criteria Total  0 Filed at: 01/09/2021 0830            MDM  Number of Diagnoses or Management Options  Chest wall pain:   Hypoxia:   Left-sided chest wall pain:   Shortness of breath:   Diagnosis management comments: 27-year-old male history of prostate cancer, type 2 diabetes, hypertension, hyperlipidemia, approximately 30 pack-year smoking history continues to smoke approximately 1 pack per day presenting with left-sided chest wall pain  Patient with acute onset left-sided chest pain in setting of coughing fit with history of cancer, concerning for possible rib fracture  Plan for basic labs, pain control, reassessment  PE also consideration  Plan to eval with D-dimer  CT non con verses PE study based on D-dimer  Joe negative  Toradol and Tylenol for pain  Re-evaluate  Patient with the hypoxia to low 90s on room air that dipped to high 80s with exertion    Placed on 2 L O2 nasal cannula with some improvement  Pain moderately improved after Tylenol and IV Toradol  Given IV morphine with additional improvement  Labs notable for a negative D-dimer  EKG no acute process and troponin negative  Delta ordered  CT scan notable for incidental 1 8 left kidney lesion  Notified patient of lesion and recommendations for follow-up ultrasound  Antibiotics for lung changes in setting of increased sputum production and worsening cough  No COPD diagnosis but will treat like one  Admitted           Amount and/or Complexity of Data Reviewed  Clinical lab tests: reviewed and ordered  Tests in the radiology section of CPT®: reviewed and ordered  Tests in the medicine section of CPT®: reviewed and ordered  Review and summarize past medical records: yes  Independent visualization of images, tracings, or specimens: yes    Risk of Complications, Morbidity, and/or Mortality  Presenting problems: moderate  Diagnostic procedures: moderate  Management options: moderate    Patient Progress  Patient progress: improved      Disposition  Final diagnoses:   Chest wall pain   Left-sided chest wall pain   Hypoxia   Shortness of breath     Time reflects when diagnosis was documented in both MDM as applicable and the Disposition within this note     Time User Action Codes Description Comment    1/9/2021 10:24 AM [place] Slate Add [R07 81] Rib pain on left side     1/9/2021 10:24 AM Kathie Slate Add [R07 89] Chest wall pain     1/9/2021 10:24 AM [place] Slate Modify [R07 89] Chest wall pain     1/9/2021 10:24 AM [place] Slate Remove [R07 81] Rib pain on left side     1/9/2021 10:25 AM Kathie Slate Add [R07 89] Left-sided chest wall pain     1/9/2021 10:25 AM Kathie Slate Modify [R07 89] Chest wall pain     1/9/2021 10:25 AM [place] Slate Modify [R07 89] Left-sided chest wall pain     1/9/2021 12:10 PM [place] Slate Add [R09 02] Hypoxia     1/9/2021 12:10 PM [place] Slate Add [R06 02] Shortness of breath       ED Disposition     ED Disposition Condition Date/Time Comment    Admit Stable Sat Jan 9, 2021 12:10 PM Case was discussed with KATALINA and the patient's admission status was agreed to be Admission Status: inpatient status to the service of Dr Abebe Bell   Follow-up Information    None         Current Discharge Medication List      CONTINUE these medications which have NOT CHANGED    Details   amLODIPine (NORVASC) 10 mg tablet TAKE 1 TABLET DAILY  Qty: 30 tablet, Refills: 3    Associated Diagnoses: Essential hypertension      budesonide (RHINOCORT ALLERGY) 32 MCG/ACT nasal spray 1 spray into each nostril daily      Cholecalciferol (VITAMIN D3) 1000 units CAPS Take 1 capsule by mouth daily      cyanocobalamin (VITAMIN B-12) 1,000 mcg tablet Take by mouth daily      fenofibrate (TRICOR) 145 mg tablet TAKE 1 TABLET BY MOUTH EVERY DAY  Qty: 30 tablet, Refills: 5    Associated Diagnoses: Hypertriglyceridemia      hydrochlorothiazide (MICROZIDE) 12 5 mg capsule TAKE 1 CAPSULE DAILY  Qty: 30 capsule, Refills: 3    Associated Diagnoses: Essential hypertension      ibuprofen (MOTRIN) 800 mg tablet Take 800 mg by mouth every 8 (eight) hours as needed  Refills: 0      lisinopril (ZESTRIL) 30 mg tablet TAKE 1 TABLET DAILY  Qty: 30 tablet, Refills: 3    Associated Diagnoses: Essential hypertension      metFORMIN (GLUCOPHAGE) 500 mg tablet TAKE 1 TABLET BY MOUTH TWICE A DAY WIHT MEALS  Qty: 60 tablet, Refills: 3    Associated Diagnoses: Diabetes 1 5, managed as type 2 (HCC)      Multiple Vitamin (MULTIVITAMIN) tablet Take 1 tablet by mouth daily      Omega-3 Fatty Acids (FISH OIL) 1,000 mg Take 3 capsules by mouth daily      VITAMIN B COMPLEX-C CAPS Take 1 capsule by mouth daily      fexofenadine (ALLEGRA) 180 MG tablet Take 1 tablet (180 mg total) by mouth daily  Refills: 0    Associated Diagnoses: Seasonal allergic rhinitis, unspecified trigger           No discharge procedures on file      PDMP Review     None           ED Provider  Attending physically available and brent Lechuga I managed the patient along with the ED Attending      Electronically Signed by         Roderick Lofton MD  01/11/21 9858

## 2021-01-09 NOTE — ED ATTENDING ATTESTATION
1/9/2021  ISienna MD, saw and evaluated the patient  I have discussed the patient with the resident/non-physician practitioner and agree with the resident's/non-physician practitioner's findings, Plan of Care, and MDM as documented in the resident's/non-physician practitioner's note, except where noted  All available labs and Radiology studies were reviewed  I was present for key portions of any procedure(s) performed by the resident/non-physician practitioner and I was immediately available to provide assistance  At this point I agree with the current assessment done in the Emergency Department  I have conducted an independent evaluation of this patient a history and physical is as follows:    OA: 61 y/o m with h/o prostate cancer, HTN, DM and HLD who presents with one week of L sided chest wall pain  Notes that the sxms began after a "hard cough" one week ago  Sxms have worsened over the past two days  + cough with increased, clear sputum production  Pain is worse with movement/breathing  Denies radiation of sxms otherwise  + SOB  No n/v  No diaphoresis  No abd or back pain  No n/v  No fevers/chills  No known covid exposures  + 30 pack year smoker  PE, uncomfortable appearing m, unable to lay back given pain, pulse ox 92% RA, improved to 95% on 2 L NC, NC/AT, MMM, clear sclera/conjunctiva, RR, - wheezing, + crackles at L base, speaks in complete sentences, + ttp over L lower anterior/lateral ribs without deformity/crepitus/ecchymosis, abd soft, +BS, -r/g, - edema, - calf ttp, + 2 distal pulses and capillary refill < 2 sec, AAO  A/p rib/chest pain with SOB, eval with cardiac labs, ddimer given smoking history and remote history of prostate cancer, pain control, imaging, treat accordingly  ED Course     PT with persistent pain   Will require admission    Critical Care Time  Procedures

## 2021-01-09 NOTE — RESPIRATORY THERAPY NOTE
RT Protocol Note  Rahul Rowell 60 y o  male MRN: [id number]  Unit/Bed#: ED 26 Encounter: [id number]    Assessment    Principal Problem:    COPD with acute exacerbation ([place] [address] )  Active Problems:    Hyperlipidemia    Hypertension    Type 2 diabetes mellitus without complication, without long-term current use of insulin (HCC)    Nicotine dependence      Home Pulmonary Medications:  na       Past Medical History:   Diagnosis Date    Adenocarcinoma of prostate ([place] [address] )     [id number]  LAST ASSESSED    Diabetes mellitus ([place] [address] )     Hypertension      Social History     Socioeconomic History    Marital status: /Civil Union     Spouse name: None    Number of children: None    Years of education: None    Highest education level: None   Occupational History    None   Social Needs    Financial resource strain: None    Food insecurity     Worry: None     Inability: None    Transportation needs     Medical: None     Non-medical: None   Tobacco Use    Smoking status: Current Every Day Smoker     Packs/day: 1 00     Types: Cigarettes    Smokeless tobacco: Never Used   Substance and Sexual Activity    Alcohol use: Not Currently     Comment: SOCIAL    Drug use: No    Sexual activity: None   Lifestyle    Physical activity     Days per week: None     Minutes per session: None    Stress: None   Relationships    Social connections     Talks on phone: None     Gets together: None     Attends Sikhism service: None     Active member of club or organization: None     Attends meetings of clubs or organizations: None     Relationship status: None    Intimate partner violence     Fear of current or ex partner: None     Emotionally abused: None     Physically abused: None     Forced sexual activity: None   Other Topics Concern    None   Social History Narrative    COPY OF ADVANCE DIRECTIVE OBTAINED       Subjective         Objective    Physical Exam:   Assessment Type: (P) Assess only  General Appearance: (P) Alert, Awake  Respiratory Pattern: (P) Normal  Chest Assessment: (P) Chest expansion symmetrical  Bilateral Breath Sounds: (P) Clear, Diminished  Cough: (P) Strong, Moist, Non-productive  O2 Device: (P) room air    Vitals:  Blood pressure 125/75, pulse 68, temperature 97 8 °F (36 6 °C), temperature source Oral, resp  rate 16, weight 83 9 kg (185 lb), SpO2 90 %  Imaging and other studies: I have personally reviewed pertinent reports        O2 Device: (P) room air     Plan    Respiratory Plan: (P) (no distress)  Airway Clearance Plan: (P) Discontinue Protocol     Resp Comments: (P) per pt, he still smoking for >30yrs, no resp meds at home, ct scan of chest shows emphysema, udn tid d/c'd, already ordered on an albuterol mdi for sob/wheezing, will continue to monitor per protocol, D/c'd ACP no indication pt has a strong lnpc

## 2021-01-09 NOTE — PLAN OF CARE
Problem: RESPIRATORY - ADULT  Goal: Achieves optimal ventilation and oxygenation  Description: INTERVENTIONS:  - Assess for changes in respiratory status  - Assess for changes in mentation and behavior  - Position to facilitate oxygenation and minimize respiratory effort  - Oxygen administered by appropriate delivery if ordered  - Initiate smoking cessation education as indicated  - Encourage broncho-pulmonary hygiene including cough, deep breathe, Incentive Spirometry  - Assess the need for suctioning and aspirate as needed  - Assess and instruct to report SOB or any respiratory difficulty  - Respiratory Therapy support as indicated  Outcome: Progressing     Problem: PAIN - ADULT  Goal: Verbalizes/displays adequate comfort level or baseline comfort level  Description: Interventions:  - Encourage patient to monitor pain and request assistance  - Assess pain using appropriate pain scale  - Administer analgesics based on type and severity of pain and evaluate response  - Implement non-pharmacological measures as appropriate and evaluate response  - Notify physician/advanced practitioner if interventions unsuccessful or patient reports new pain  Outcome: Progressing     Problem: INFECTION - ADULT  Goal: Absence or prevention of progression during hospitalization  Description: INTERVENTIONS:  - Assess and monitor for signs and symptoms of infection  - Monitor lab/diagnostic results  - Monitor all insertion sites, i e  indwelling lines, tubes, and drains  - [place] appropriate cooling/warming therapies per order  - Administer medications as ordered  - Instruct and encourage patient and family to use good hand hygiene technique  - Identify and instruct in appropriate isolation precautions for identified infection/condition  Outcome: Progressing     Problem: SAFETY ADULT  Goal: Patient will remain free of falls  Description: INTERVENTIONS:  - Assess patient frequently for physical needs  -  Identify cognitive and physical deficits and behaviors that affect risk of falls  -  [place] fall precautions as indicated by assessment   - Educate patient/family on patient safety including physical limitations  - Instruct patient to call for assistance with activity based on assessment  - Modify environment to reduce risk of injury  - Consider OT/PT consult to assist with strengthening/mobility  Outcome: Progressing     Problem: DISCHARGE PLANNING  Goal: Discharge to home or other facility with appropriate resources  Description: INTERVENTIONS:  - Identify barriers to discharge w/patient and caregiver  - Arrange for needed discharge resources and transportation as appropriate  - Identify discharge learning needs (meds, wound care, etc )  - Refer to Case Management Department for coordinating discharge planning if the patient needs post-hospital services based on physician/advanced practitioner order or complex needs related to functional status, cognitive ability, or social support system  Outcome: Progressing     Problem: Knowledge Deficit  Goal: Patient/family/caregiver demonstrates understanding of disease process, treatment plan, medications, and discharge instructions  Description: Complete learning assessment and assess knowledge base    Interventions:  - Provide teaching at level of understanding  - Provide teaching via preferred learning methods  Outcome: Progressing

## 2021-01-09 NOTE — DISCHARGE INSTRUCTIONS
Hyperdense lesions involving the left kidney with the largest measuring 1 8 cm  We will attempt to obtain prior studies otherwise further evaluation with nonemergent ultrasound or renal protocol CT may be useful

## 2021-01-09 NOTE — ASSESSMENT & PLAN NOTE
Lab Results   Component Value Date    HGBA1C 6 2 (H) 12/12/2020   Hold home metformin  Sliding scale insulin

## 2021-01-09 NOTE — H&P
H&P- Alyse Herrera 1955, 60 y o  male MRN: [id number]    Unit/Bed#: ED 26 Encounter: [id number]    Primary Care Provider: Mirta Weber DO   Date and time admitted to hospital: 1/9/2021  7:58 AM        * COPD with acute exacerbation ([facility] 75 )  Assessment & Plan  Presented with shortness of breath, nonproductive cough and wheezing  Likely secondary to viral URI  No formal diagnosis of COPD however does have a 30 pack-year smoking history  Will need outpatient PFTs formally diagnose  Was noted to be saturating in the mid 80s with exertion however on room air at rest without any desaturation  Will provide IV steroids  Nebulizer therapy  Respiratory protocol  Will need maintenance therapy on discharge    Nicotine dependence  Assessment & Plan  Smoking cessation counseling  Nicotine patch    Type 2 diabetes mellitus without complication, without long-term current use of insulin ([facility] 75 )  Assessment & Plan    Lab Results   Component Value Date    HGBA1C 6 2 (H) 12/12/2020   Hold home metformin  Sliding scale insulin    Hypertension  Assessment & Plan  Continue home lisinopril, HCTZ    Hyperlipidemia  Assessment & Plan  Continue home  fenofibrate          VTE Prophylaxis: Heparin  / sequential compression device   Code Status: full code  POLST: There is no POLST form on file for this patient (pre-hospital)  Discussion with family: pt    Anticipated Length of Stay:  Patient will be admitted on an Inpatient basis with an anticipated length of stay of  > 2 midnights  Justification for Hospital Stay:  COPD exacerbation    Total Time for Visit, including Counseling / Coordination of Care: 1 hour  Greater than 50% of this total time spent on direct patient counseling and coordination of care      Chief Complaint:   Shortness of breath    History of Present Illness:    Alyse Herrera is a 60 y o  male with past medical history significant prostate cancer, type 2 diabetes, hypertension, hyperlipidemia tobacco dependence initially presented with shortness of breath, and nonproductive cough  Patient reports past week has had a nonproductive cough that progressively worsened over past 1 week  Also notes pleuritic chest pain as well  Chest pain not related to exertion  Currently denies palpitations, abdominal pain, nausea, vomiting, diarrhea, constipation, dysuria, headaches, numbness, weakness or any other symptoms  Review of Systems:    Review of Systems   Constitutional: Negative for appetite change, chills, diaphoresis, fatigue, fever and unexpected weight change  HENT: Negative for congestion, rhinorrhea and sore throat  Eyes: Negative for photophobia and visual disturbance  Respiratory: Positive for cough, shortness of breath and wheezing  Cardiovascular: Positive for chest pain  Negative for palpitations and leg swelling  Gastrointestinal: Negative for abdominal pain, anal bleeding, blood in stool, constipation, diarrhea, nausea and vomiting  Genitourinary: Negative for decreased urine volume, difficulty urinating, dysuria, flank pain, frequency, hematuria and urgency  Musculoskeletal: Negative for arthralgias, back pain, joint swelling and myalgias  Skin: Negative for color change and rash  Neurological: Negative for dizziness, seizures, facial asymmetry, speech difficulty, numbness and headaches  Psychiatric/Behavioral: Negative for agitation, confusion and decreased concentration  The patient is not nervous/anxious  Past Medical and Surgical History:     Past Medical History:   Diagnosis Date    Adenocarcinoma of prostate [facility])     [id number]  LAST ASSESSED    Diabetes mellitus ([email or web address] )     Hypertension        Past Surgical History:   Procedure Laterality Date    COLON SURGERY      HERNIA REPAIR      SHOULDER SURGERY      TONSILLECTOMY      TONSILLECTOMY AND ADENOIDECTOMY         Meds/Allergies:    Prior to Admission medications    Medication Sig Start Date End Date Taking? Authorizing Provider   amLODIPine (NORVASC) 10 mg tablet TAKE 1 TABLET DAILY 9/23/20  Yes Manfred Ramirez DO   budesonide (RHINOCORT ALLERGY) 32 MCG/ACT nasal spray 1 spray into each nostril daily 6/9/17  Yes Historical Provider, MD   Cholecalciferol (VITAMIN D3) 1000 units CAPS Take 1 capsule by mouth daily 4/21/15  Yes Historical Provider, MD   cyanocobalamin (VITAMIN B-12) 1,000 mcg tablet Take by mouth daily   Yes Historical Provider, MD   fenofibrate (TRICOR) 145 mg tablet TAKE 1 TABLET BY MOUTH EVERY DAY 9/23/20  Yes Manfred Ramirez DO   fexofenadine (ALLEGRA) 180 MG tablet Take 1 tablet (180 mg total) by mouth daily 6/12/18  Yes Manfred Ramirez DO   hydrochlorothiazide (MICROZIDE) 12 5 mg capsule TAKE 1 CAPSULE DAILY 9/23/20  Yes Manfred Ramirez DO   ibuprofen (MOTRIN) 800 mg tablet Take 800 mg by mouth every 8 (eight) hours as needed 11/10/18  Yes Historical Provider, MD   lisinopril (ZESTRIL) 30 mg tablet TAKE 1 TABLET DAILY 9/23/20  Yes Manfred Ramirez DO   metFORMIN (GLUCOPHAGE) 500 mg tablet TAKE 1 TABLET BY MOUTH TWICE A DAY WIHT MEALS  9/23/20  Yes Manfred Ramirez DO   Multiple Vitamin (MULTIVITAMIN) tablet Take 1 tablet by mouth daily   Yes Historical Provider, MD   Omega-3 Fatty Acids (FISH OIL) 1,000 mg Take 3 capsules by mouth daily   Yes Historical Provider, MD   VITAMIN B COMPLEX-C CAPS Take 1 capsule by mouth daily   Yes Historical Provider, MD   amLODIPine-atorvastatin (CADUET) 10-20 MG per tablet Take 1 tablet by mouth daily Replaces amlodipine and fenofibrate 1/6/20 1/9/21  Manfred Ramirez DO   clindamycin (CLEOCIN) 300 MG capsule  5/28/19 1/9/21  Historical Provider, MD   econazole nitrate 1 % cream Apply topically daily 12/17/19 1/9/21  Manfred Ramirez DO     I have reviewed home medications with patient personally  Allergies: Allergies   Allergen Reactions    Penicillins      Other reaction(s): Other (See Comments)  Unknown  Other reaction(s):  Other (See Comments)  Unknown  Other reaction(s): Unknown  Category: Allergy;        Social History:     Marital Status: /Civil Union     Patient Pre-hospital Living Situation: home  Patient Pre-hospital Level of Mobility: independent  Patient Pre-hospital Diet Restrictions: none  Substance Use History:   Social History     Substance and Sexual Activity   Alcohol Use Not Currently    Comment: SOCIAL     Social History     Tobacco Use   Smoking Status Current Every Day Smoker    Packs/day: 1 00    Types: Cigarettes   Smokeless Tobacco Never Used     Social History     Substance and Sexual Activity   Drug Use No       Family History:    Family History   Problem Relation Age of Onset    Diabetes Father         MELLITUS       Physical Exam:     Vitals:   Blood Pressure: 125/75 (01/09/21 1000)  Pulse: 74 (01/09/21 1000)  Temperature: 97 8 °F (36 6 °C) (01/09/21 0805)  Temp Source: Oral (01/09/21 0805)  Respirations: 20 (01/09/21 1000)  Weight - Scale: 83 9 kg (185 lb) (01/09/21 0805)  SpO2: 96 % (01/09/21 1000)    Physical Exam  Constitutional:       General: He is not in acute distress  Appearance: He is well-developed  He is not diaphoretic  HENT:      Head: Normocephalic and atraumatic  Nose: Nose normal       Mouth/Throat:      Pharynx: No oropharyngeal exudate  Eyes:      General: No scleral icterus  Conjunctiva/sclera: Conjunctivae normal    Neck:      Musculoskeletal: Normal range of motion  Vascular: No JVD  Cardiovascular:      Rate and Rhythm: Normal rate and regular rhythm  Heart sounds: Normal heart sounds  No murmur  No friction rub  No gallop  Pulmonary:      Effort: Pulmonary effort is normal  No respiratory distress  Breath sounds: Wheezing present  No rales  Chest:      Chest wall: No tenderness  Abdominal:      General: Bowel sounds are normal  There is no distension  Palpations: Abdomen is soft  Tenderness: There is no abdominal tenderness   There is no guarding  Musculoskeletal: Normal range of motion  General: No tenderness or deformity  Lymphadenopathy:      Cervical: No cervical adenopathy  Skin:     General: Skin is warm and dry  Findings: No erythema  Neurological:      Mental Status: He is alert and oriented to person, place, and time  Cranial Nerves: No cranial nerve deficit  Coordination: Coordination normal       Deep Tendon Reflexes: Reflexes normal            Additional Data:     Lab Results: I have personally reviewed pertinent reports  Results from last 7 days   Lab Units 01/09/21  0905   WBC Thousand/uL 14 53*   HEMOGLOBIN g/dL 17 2*   HEMATOCRIT % 53 0*   PLATELETS Thousands/uL 366   NEUTROS PCT % 66   LYMPHS PCT % 24   MONOS PCT % 9   EOS PCT % 1     Results from last 7 days   Lab Units 01/09/21  0905   SODIUM mmol/L 139   POTASSIUM mmol/L 4 2   CHLORIDE mmol/L 109*   CO2 mmol/L 25   BUN mg/dL 23   CREATININE mg/dL 0 89   ANION GAP mmol/L 5   CALCIUM mg/dL 10 6*   ALBUMIN g/dL 4 4   TOTAL BILIRUBIN mg/dL 0 46   ALK PHOS U/L 99   ALT U/L 25   AST U/L 11   GLUCOSE RANDOM mg/dL 117                       Imaging: I have personally reviewed pertinent reports  CT chest without contrast   Final Result by Anupama Gauthier MD (01/09 1013)      Atelectatic changes at the bases  No evidence of rib fracture or pneumothorax  Hyperdense lesions involving the left kidney with the largest measuring 1 8 cm  We will attempt to obtain prior studies otherwise further evaluation with nonemergent ultrasound or renal protocol CT may be useful  The study was marked in EPIC for significant notification  Workstation performed: [id number]             EKG, Pathology, and Other Studies Reviewed on Admission:   · EKG: reviewed    Allscripts / Epic Records Reviewed: Yes     ** Please Note: This note has been constructed using a voice recognition system   **

## 2021-01-09 NOTE — ASSESSMENT & PLAN NOTE
Presented with shortness of breath, nonproductive cough and wheezing  Likely secondary to viral URI  No formal diagnosis of COPD however does have a 30 pack-year smoking history  Will need outpatient PFTs formally diagnose  Was noted to be saturating in the mid 80s with exertion however on room air at rest without any desaturation  Will provide IV steroids  Nebulizer therapy  Respiratory protocol  Will need maintenance therapy on discharge

## 2021-01-10 PROBLEM — Z72.0 TOBACCO ABUSE: Status: ACTIVE | Noted: 2021-01-09

## 2021-01-10 PROBLEM — D72.829 LEUKOCYTOSIS: Status: ACTIVE | Noted: 2021-01-10

## 2021-01-10 LAB
ANION GAP SERPL CALCULATED.3IONS-SCNC: 6 MMOL/L (ref 4–13)
BASOPHILS # BLD AUTO: 0.02 THOUSANDS/ΜL (ref 0–0.1)
BASOPHILS NFR BLD AUTO: 0 % (ref 0–1)
BUN SERPL-MCNC: 24 MG/DL (ref 5–25)
CALCIUM SERPL-MCNC: 10.4 MG/DL (ref 8.3–10.1)
CHLORIDE SERPL-SCNC: 108 MMOL/L (ref 100–108)
CO2 SERPL-SCNC: 23 MMOL/L (ref 21–32)
CREAT SERPL-MCNC: 0.95 MG/DL (ref 0.6–1.3)
EOSINOPHIL # BLD AUTO: 0 THOUSAND/ΜL (ref 0–0.61)
EOSINOPHIL NFR BLD AUTO: 0 % (ref 0–6)
ERYTHROCYTE [DISTWIDTH] IN BLOOD BY AUTOMATED COUNT: 14.3 % (ref 11.6–15.1)
GFR SERPL CREATININE-BSD FRML MDRD: 84 ML/MIN/1.73SQ M
GLUCOSE SERPL-MCNC: 190 MG/DL (ref 65–140)
GLUCOSE SERPL-MCNC: 192 MG/DL (ref 65–140)
GLUCOSE SERPL-MCNC: 223 MG/DL (ref 65–140)
GLUCOSE SERPL-MCNC: 250 MG/DL (ref 65–140)
HCT VFR BLD AUTO: 49.8 % (ref 36.5–49.3)
HCV AB SER QL: NORMAL
HGB BLD-MCNC: 16.4 G/DL (ref 12–17)
IMM GRANULOCYTES # BLD AUTO: 0.06 THOUSAND/UL (ref 0–0.2)
IMM GRANULOCYTES NFR BLD AUTO: 0 % (ref 0–2)
LYMPHOCYTES # BLD AUTO: 1.98 THOUSANDS/ΜL (ref 0.6–4.47)
LYMPHOCYTES NFR BLD AUTO: 13 % (ref 14–44)
MCH RBC QN AUTO: 29.1 PG (ref 26.8–34.3)
MCHC RBC AUTO-ENTMCNC: 32.9 G/DL (ref 31.4–37.4)
MCV RBC AUTO: 88 FL (ref 82–98)
MONOCYTES # BLD AUTO: 0.47 THOUSAND/ΜL (ref 0.17–1.22)
MONOCYTES NFR BLD AUTO: 3 % (ref 4–12)
NEUTROPHILS # BLD AUTO: 12.58 THOUSANDS/ΜL (ref 1.85–7.62)
NEUTS SEG NFR BLD AUTO: 84 % (ref 43–75)
NRBC BLD AUTO-RTO: 0 /100 WBCS
PLATELET # BLD AUTO: 336 THOUSANDS/UL (ref 149–390)
PMV BLD AUTO: 9.2 FL (ref 8.9–12.7)
POTASSIUM SERPL-SCNC: 4.3 MMOL/L (ref 3.5–5.3)
PROCALCITONIN SERPL-MCNC: <0.05 NG/ML
RBC # BLD AUTO: 5.64 MILLION/UL (ref 3.88–5.62)
SODIUM SERPL-SCNC: 137 MMOL/L (ref 136–145)
WBC # BLD AUTO: 15.11 THOUSAND/UL (ref 4.31–10.16)

## 2021-01-10 PROCEDURE — 80048 BASIC METABOLIC PNL TOTAL CA: CPT | Performed by: INTERNAL MEDICINE

## 2021-01-10 PROCEDURE — 86803 HEPATITIS C AB TEST: CPT | Performed by: INTERNAL MEDICINE

## 2021-01-10 PROCEDURE — 99232 SBSQ HOSP IP/OBS MODERATE 35: CPT | Performed by: INTERNAL MEDICINE

## 2021-01-10 PROCEDURE — 82948 REAGENT STRIP/BLOOD GLUCOSE: CPT

## 2021-01-10 PROCEDURE — 85025 COMPLETE CBC W/AUTO DIFF WBC: CPT | Performed by: INTERNAL MEDICINE

## 2021-01-10 PROCEDURE — 84145 PROCALCITONIN (PCT): CPT | Performed by: INTERNAL MEDICINE

## 2021-01-10 RX ORDER — INSULIN GLARGINE 100 [IU]/ML
5 INJECTION, SOLUTION SUBCUTANEOUS
Status: DISCONTINUED | OUTPATIENT
Start: 2021-01-10 | End: 2021-01-11 | Stop reason: HOSPADM

## 2021-01-10 RX ORDER — IPRATROPIUM BROMIDE AND ALBUTEROL SULFATE 2.5; .5 MG/3ML; MG/3ML
3 SOLUTION RESPIRATORY (INHALATION) EVERY 4 HOURS PRN
Status: DISCONTINUED | OUTPATIENT
Start: 2021-01-10 | End: 2021-01-11 | Stop reason: HOSPADM

## 2021-01-10 RX ORDER — MAGNESIUM SULFATE HEPTAHYDRATE 40 MG/ML
2 INJECTION, SOLUTION INTRAVENOUS ONCE
Status: COMPLETED | OUTPATIENT
Start: 2021-01-10 | End: 2021-01-11

## 2021-01-10 RX ADMIN — GUAIFENESIN 600 MG: 600 TABLET, EXTENDED RELEASE ORAL at 21:59

## 2021-01-10 RX ADMIN — INSULIN LISPRO 2 UNITS: 100 INJECTION, SOLUTION INTRAVENOUS; SUBCUTANEOUS at 22:00

## 2021-01-10 RX ADMIN — AMLODIPINE BESYLATE 10 MG: 10 TABLET ORAL at 08:29

## 2021-01-10 RX ADMIN — MAGNESIUM SULFATE HEPTAHYDRATE 2 G: 40 INJECTION, SOLUTION INTRAVENOUS at 22:58

## 2021-01-10 RX ADMIN — GUAIFENESIN 200 MG: 100 SOLUTION ORAL at 03:47

## 2021-01-10 RX ADMIN — METHYLPREDNISOLONE SODIUM SUCCINATE 40 MG: 40 INJECTION, POWDER, FOR SOLUTION INTRAMUSCULAR; INTRAVENOUS at 05:19

## 2021-01-10 RX ADMIN — GUAIFENESIN 200 MG: 100 SOLUTION ORAL at 15:49

## 2021-01-10 RX ADMIN — LORATADINE 10 MG: 10 TABLET ORAL at 08:22

## 2021-01-10 RX ADMIN — INSULIN LISPRO 3 UNITS: 100 INJECTION, SOLUTION INTRAVENOUS; SUBCUTANEOUS at 12:29

## 2021-01-10 RX ADMIN — ACETAMINOPHEN 650 MG: 325 TABLET, FILM COATED ORAL at 21:59

## 2021-01-10 RX ADMIN — HYDROCHLOROTHIAZIDE 12.5 MG: 12.5 TABLET ORAL at 08:29

## 2021-01-10 RX ADMIN — GUAIFENESIN 200 MG: 100 SOLUTION ORAL at 22:05

## 2021-01-10 RX ADMIN — AZITHROMYCIN 500 MG: 250 TABLET, FILM COATED ORAL at 08:25

## 2021-01-10 RX ADMIN — LISINOPRIL 30 MG: 10 TABLET ORAL at 08:29

## 2021-01-10 RX ADMIN — HEPARIN SODIUM 5000 UNITS: 5000 INJECTION INTRAVENOUS; SUBCUTANEOUS at 05:18

## 2021-01-10 RX ADMIN — GUAIFENESIN 200 MG: 100 SOLUTION ORAL at 08:20

## 2021-01-10 RX ADMIN — HEPARIN SODIUM 5000 UNITS: 5000 INJECTION INTRAVENOUS; SUBCUTANEOUS at 21:59

## 2021-01-10 RX ADMIN — INSULIN LISPRO 1 UNITS: 100 INJECTION, SOLUTION INTRAVENOUS; SUBCUTANEOUS at 17:27

## 2021-01-10 RX ADMIN — METHYLPREDNISOLONE SODIUM SUCCINATE 40 MG: 40 INJECTION, POWDER, FOR SOLUTION INTRAMUSCULAR; INTRAVENOUS at 21:59

## 2021-01-10 RX ADMIN — HEPARIN SODIUM 5000 UNITS: 5000 INJECTION INTRAVENOUS; SUBCUTANEOUS at 13:21

## 2021-01-10 RX ADMIN — IBUPROFEN 800 MG: 400 TABLET ORAL at 08:21

## 2021-01-10 RX ADMIN — METHYLPREDNISOLONE SODIUM SUCCINATE 40 MG: 40 INJECTION, POWDER, FOR SOLUTION INTRAMUSCULAR; INTRAVENOUS at 12:30

## 2021-01-10 RX ADMIN — FENOFIBRATE 145 MG: 145 TABLET, FILM COATED ORAL at 08:30

## 2021-01-10 RX ADMIN — Medication 1 PATCH: at 08:27

## 2021-01-10 RX ADMIN — IBUPROFEN 800 MG: 400 TABLET ORAL at 03:47

## 2021-01-10 RX ADMIN — ACETAMINOPHEN 650 MG: 325 TABLET, FILM COATED ORAL at 15:49

## 2021-01-10 RX ADMIN — INSULIN GLARGINE 5 UNITS: 100 INJECTION, SOLUTION SUBCUTANEOUS at 21:59

## 2021-01-10 RX ADMIN — GUAIFENESIN 600 MG: 600 TABLET, EXTENDED RELEASE ORAL at 08:22

## 2021-01-10 NOTE — PLAN OF CARE
Problem: RESPIRATORY - ADULT  Goal: Achieves optimal ventilation and oxygenation  Description: INTERVENTIONS:  - Assess for changes in respiratory status  - Assess for changes in mentation and behavior  - Position to facilitate oxygenation and minimize respiratory effort  - Oxygen administered by appropriate delivery if ordered  - Initiate smoking cessation education as indicated  - Encourage broncho-pulmonary hygiene including cough, deep breathe, Incentive Spirometry  - Assess the need for suctioning and aspirate as needed  - Assess and instruct to report SOB or any respiratory difficulty  - Respiratory Therapy support as indicated  Outcome: Progressing     Problem: PAIN - ADULT  Goal: Verbalizes/displays adequate comfort level or baseline comfort level  Description: Interventions:  - Encourage patient to monitor pain and request assistance  - Assess pain using appropriate pain scale  - Administer analgesics based on type and severity of pain and evaluate response  - Implement non-pharmacological measures as appropriate and evaluate response  - Notify physician/advanced practitioner if interventions unsuccessful or patient reports new pain  Outcome: Progressing     Problem: INFECTION - ADULT  Goal: Absence or prevention of progression during hospitalization  Description: INTERVENTIONS:  - Assess and monitor for signs and symptoms of infection  - Monitor lab/diagnostic results  - Monitor all insertion sites, i e  indwelling lines, tubes, and drains  - [place] appropriate cooling/warming therapies per order  - Administer medications as ordered  - Instruct and encourage patient and family to use good hand hygiene technique  - Identify and instruct in appropriate isolation precautions for identified infection/condition  Outcome: Progressing     Problem: SAFETY ADULT  Goal: Patient will remain free of falls  Description: INTERVENTIONS:  - Assess patient frequently for physical needs  -  Identify cognitive and physical deficits and behaviors that affect risk of falls  -  [place] fall precautions as indicated by assessment   - Educate patient/family on patient safety including physical limitations  - Instruct patient to call for assistance with activity based on assessment  - Modify environment to reduce risk of injury  - Consider OT/PT consult to assist with strengthening/mobility  Outcome: Progressing     Problem: DISCHARGE PLANNING  Goal: Discharge to home or other facility with appropriate resources  Description: INTERVENTIONS:  - Identify barriers to discharge w/patient and caregiver  - Arrange for needed discharge resources and transportation as appropriate  - Identify discharge learning needs (meds, wound care, etc )  - Refer to Case Management Department for coordinating discharge planning if the patient needs post-hospital services based on physician/advanced practitioner order or complex needs related to functional status, cognitive ability, or social support system  Outcome: Progressing     Problem: Knowledge Deficit  Goal: Patient/family/caregiver demonstrates understanding of disease process, treatment plan, medications, and discharge instructions  Description: Complete learning assessment and assess knowledge base    Interventions:  - Provide teaching at level of understanding  - Provide teaching via preferred learning methods  Outcome: Progressing

## 2021-01-10 NOTE — CASE MANAGEMENT
Met with pt, explained CM program/CM role  LOS-1  Bundle-no  Unplanned readmission color-green  30 day readmission-no  Resides with wife in a mobile home, 1 ANGY  I PTA for ADL's/ambulation, drives, retired  PCP Dr Tyler Morgan  Denies HHC/DME/IP Rehab  Denies MH illness, D&A abuse  Primary contact wife [phone]  No POA/LW  Wife will transport home    CM reviewed d/c planning process including the following: identifying help at home, patient preference for d/c planning needs, Discharge Lounge, [facility] Meds to Bed program, availability of treatment team to discuss questions or concerns patient and/or family may have regarding understanding medications and recognizing signs and symptoms once discharged  CM also encouraged patient to follow up with all recommended appointments after discharge  Patient advised of importance for patient and family to participate in managing patients medical well being  Patient/caregiver received discharge checklist  Content reviewed  Patient/caregiver encouraged to participate in discharge plan of care prior to discharge home

## 2021-01-11 ENCOUNTER — TRANSITIONAL CARE MANAGEMENT (OUTPATIENT)
Dept: INTERNAL MEDICINE CLINIC | Facility: CLINIC | Age: 66
End: 2021-01-11

## 2021-01-11 VITALS
WEIGHT: 175.93 LBS | BODY MASS INDEX: 25.98 KG/M2 | DIASTOLIC BLOOD PRESSURE: 77 MMHG | OXYGEN SATURATION: 97 % | SYSTOLIC BLOOD PRESSURE: 127 MMHG | RESPIRATION RATE: 17 BRPM | HEART RATE: 78 BPM | TEMPERATURE: 97.6 F

## 2021-01-11 LAB
BACTERIA SPT RESP CULT: ABNORMAL
GLUCOSE SERPL-MCNC: 157 MG/DL (ref 65–140)
GLUCOSE SERPL-MCNC: 181 MG/DL (ref 65–140)
GRAM STN SPEC: ABNORMAL
WBC # BLD AUTO: 20.86 THOUSAND/UL (ref 4.31–10.16)

## 2021-01-11 PROCEDURE — 85048 AUTOMATED LEUKOCYTE COUNT: CPT | Performed by: INTERNAL MEDICINE

## 2021-01-11 PROCEDURE — 99239 HOSP IP/OBS DSCHRG MGMT >30: CPT | Performed by: INTERNAL MEDICINE

## 2021-01-11 PROCEDURE — 82948 REAGENT STRIP/BLOOD GLUCOSE: CPT

## 2021-01-11 RX ORDER — GUAIFENESIN/DEXTROMETHORPHAN 100-10MG/5
5 SYRUP ORAL 3 TIMES DAILY PRN
Qty: 118 ML | Refills: 0 | Status: SHIPPED | OUTPATIENT
Start: 2021-01-11 | End: 2021-01-16

## 2021-01-11 RX ORDER — PREDNISONE 10 MG/1
TABLET ORAL
Qty: 20 TABLET | Refills: 0 | Status: SHIPPED | OUTPATIENT
Start: 2021-01-11 | End: 2021-01-19

## 2021-01-11 RX ADMIN — HYDROCHLOROTHIAZIDE 12.5 MG: 12.5 TABLET ORAL at 08:48

## 2021-01-11 RX ADMIN — GUAIFENESIN 200 MG: 100 SOLUTION ORAL at 10:21

## 2021-01-11 RX ADMIN — ACETAMINOPHEN 650 MG: 325 TABLET, FILM COATED ORAL at 06:25

## 2021-01-11 RX ADMIN — LORATADINE 10 MG: 10 TABLET ORAL at 08:48

## 2021-01-11 RX ADMIN — FENOFIBRATE 145 MG: 145 TABLET, FILM COATED ORAL at 08:48

## 2021-01-11 RX ADMIN — GUAIFENESIN 200 MG: 100 SOLUTION ORAL at 05:24

## 2021-01-11 RX ADMIN — HEPARIN SODIUM 5000 UNITS: 5000 INJECTION INTRAVENOUS; SUBCUTANEOUS at 05:24

## 2021-01-11 RX ADMIN — LISINOPRIL 30 MG: 10 TABLET ORAL at 08:48

## 2021-01-11 RX ADMIN — AMLODIPINE BESYLATE 10 MG: 10 TABLET ORAL at 08:48

## 2021-01-11 RX ADMIN — Medication 1 PATCH: at 08:48

## 2021-01-11 RX ADMIN — AZITHROMYCIN 500 MG: 250 TABLET, FILM COATED ORAL at 06:25

## 2021-01-11 RX ADMIN — METHYLPREDNISOLONE SODIUM SUCCINATE 40 MG: 40 INJECTION, POWDER, FOR SOLUTION INTRAMUSCULAR; INTRAVENOUS at 05:24

## 2021-01-11 RX ADMIN — INSULIN LISPRO 1 UNITS: 100 INJECTION, SOLUTION INTRAVENOUS; SUBCUTANEOUS at 12:01

## 2021-01-11 RX ADMIN — GUAIFENESIN 600 MG: 600 TABLET, EXTENDED RELEASE ORAL at 08:48

## 2021-01-11 RX ADMIN — INSULIN LISPRO 1 UNITS: 100 INJECTION, SOLUTION INTRAVENOUS; SUBCUTANEOUS at 08:49

## 2021-01-11 NOTE — ASSESSMENT & PLAN NOTE
Lab Results   Component Value Date    HGBA1C 6 2 (H) 12/12/2020     Hold Metformin while hospitalized  On SSI coverage per [employer] - will add basal Lantus for optimal coverage in the setting of corticosteroids

## 2021-01-11 NOTE — PROGRESS NOTES
[facility] - Progress Note  Patient: Vern Cornejo 60 y o  male MRN: [id number]  Unit/Bed#: [facility] 921-01 Encounter: [id number]  Primary Care Provider: Ashley Perla DO  Date Of Visit: 01/10/21        Assessment & Plan:    * COPD exacerbation  Assessment & Plan  Presented with shortness blood coupled w/ a nonproductive cough and wheezing  Improved after initiation of IV Solu-Medrol w/ plan for eventual taper -> Duonebs on board - c/w Zithromax  No formal diagnosis of COPD, however, in the clinical picture of significant tobacco abuse, seems likely -> Will require outpatient PFTs for formal diagnosis  Currently oxygenating on room air - respiratory protocol  With clinical improvement, consider transitioning to PO Prednisone tomorrow to initiate discharge planning    Leukocytosis  Assessment & Plan  Likely reactive acute medical issue(s) exacerbated by corticosteroid administration  Monitor WBC count    Essential hypertension  Assessment & Plan  Continue Norvasc/HCTZ/Zestril  Low-sodium diet    Dyslipidemia  Assessment & Plan  Continue Fenofibrate    Tobacco abuse  Assessment & Plan  Smoking cessation counseling  Transdermal nicotine patch on board    Diabetes mellitus type 2  Assessment & Plan    Lab Results   Component Value Date    HGBA1C 6 2 (H) 12/12/2020     Hold Metformin while hospitalized  On SSI coverage per Accu-Cheks - will add basal Lantus for optimal coverage in the setting of corticosteroids      DVT Prophylaxis:  Heparin SC      Patient Centered Rounds:  I have performed bedside rounds and discussed plan of care with nursing today  Discussions with Specialists or Other Care Team Provider:  see above assessments if applicable    Education and Discussions with Family / Patient:  Patient at bedside - denied/refused need to update other family/friends today    Time Spent for Care:  32 minutes    More than 50% of total time spent on counseling and coordination of care as described above  Current Length of Stay: 1 day(s)    Current Patient Status: Inpatient   Certification Statement:  Patient will continue to require additional hospital stay due to assessments as noted above  Code Status: Level 1 - Full Code        Subjective:     Seen/examined earlier in the day  Sitting upright in bed consuming a meal at the time my encounter  States his shortness of breath and wheezing have improved with decrease in the frequency of coughing  Currently oxygenating on room air  Remains in hopeful spirits  Objective:     Vitals:   Temp (24hrs), Av °F (36 7 °C), Min:97 5 °F (36 4 °C), Max:98 5 °F (36 9 °C)    Temp:  [97 5 °F (36 4 °C)-98 5 °F (36 9 °C)] 98 °F (36 7 °C)  HR:  [87-96] 88  Resp:  [18] 18  BP: (132-147)/(84-92) 145/91  SpO2:  [90 %-94 %] 94 %  Body mass index is 25 98 kg/m²  Input and Output Summary (last 24 hours):        Intake/Output Summary (Last 24 hours) at 1/10/2021 1910  Last data filed at 1/10/2021 1512  Gross per 24 hour   Intake 520 ml   Output 0 ml   Net 520 ml       Physical Exam:     GENERAL:  Well-developed/nourished - no current conversational dyspnea  HEAD:  Normocephalic - atraumatic  EYES: PERRL - EOMI   MOUTH:  Mucosa moist  NECK:  Supple - full range of motion  CARDIAC:  Rate control - S1/S2 positive  PULMONARY:  Diminished at the bases with intermittent expiratory wheezes  ABDOMEN:  Soft - nontender/nondistended - active bowel sounds  MUSCULOSKELETAL:  Motor strength/range of motion intact  NEUROLOGIC:  Alert/oriented at baseline  SKIN:  Chronic wrinkles/blemishes   PSYCHIATRIC:  Mood/affect stable      Additional Data:     Labs & Recent Cultures:    Results from last 7 days   Lab Units 01/10/21  0709   WBC Thousand/uL 15 11*   HEMOGLOBIN g/dL 16 4   HEMATOCRIT % 49 8*   PLATELETS Thousands/uL 336   NEUTROS PCT % 84*   LYMPHS PCT % 13*   MONOS PCT % 3*   EOS PCT % 0     Results from last 7 days   Lab Units 01/10/21  0709 21  5299 POTASSIUM mmol/L 4 3 4 2   CHLORIDE mmol/L 108 109*   CO2 mmol/L 23 25   BUN mg/dL 24 23   CREATININE mg/dL 0 95 0 89   CALCIUM mg/dL 10 4* 10 6*   ALK PHOS U/L  --  99   ALT U/L  --  25   AST U/L  --  11         Results from last 7 days   Lab Units 01/10/21  1639 01/10/21  1132 01/09/21  2131 01/09/21  1736 01/09/21  1256   POC GLUCOSE mg/dl 192* 250* 253* 151* 116         Results from last 7 days   Lab Units 01/10/21  0709   PROCALCITONIN ng/ml <0 05         Results from last 7 days   Lab Units 01/09/21  1936   SPUTUM CULTURE  Culture too young- will reincubate   GRAM STAIN RESULT  1+ Epithelial cells per low power field*  No polys seen*  1+ Gram positive cocci in pairs*  1+ Gram negative rods*         Last 24 Hours Medication List:   Current Facility-Administered Medications   Medication Dose Route Frequency Provider Last Rate    acetaminophen  650 mg Oral Q6H PRN Farzad Izquierdo MD      albuterol  2 puff Inhalation Q4H PRN Farzad Izquierdo MD      amLODIPine  10 mg Oral Daily Farzad Izquierdo MD      azithromycin  500 mg Oral Q24H Farzad Izquierdo MD      fenofibrate  145 mg Oral Daily Farzad Izquierdo MD      guaiFENesin  600 mg Oral Q12H [facility] Farzad Izquierdo MD      guaiFENesin  200 mg Oral Q4H PRN Farzad Izquierdo MD      heparin (porcine)  5,000 Units Subcutaneous Q8H [facility] Farzad Izquierdo MD      hydrochlorothiazide  12 5 mg Oral Daily Farzad Izquierdo MD      ibuprofen  800 mg Oral Q8H PRN Farzad Izquierdo MD      insulin glargine  5 Units Subcutaneous HS Venkatesh Sheriff MD      insulin lispro  1-5 Units Subcutaneous HS Farzad Izquierdo MD      insulin lispro  1-6 Units Subcutaneous TID AC Farzad Izquierdo MD      ipratropium-albuterol  3 mL Nebulization Q4H PRN Venkatesh Sheriff MD      lisinopril  30 mg Oral Daily Farzad Izquierdo MD      loratadine  10 mg Oral Daily Farzad Izquierdo MD      magnesium sulfate  2 g Intravenous Once Venkatesh Sheriff MD      methylPREDNISolone sodium succinate  40 mg Intravenous Dony Butt MD  nicotine  1 patch Transdermal Daily Farzad Izquierdo MD                  ** Please Note: This note is constructed using a voice recognition dictation system  An occasional wrong word/phrase or sound-a-like substitution may have been picked up by dictation device due to the inherent limitations of voice recognition software  Read the chart carefully and recognize, using reasonable context, where substitutions may have occurred  **

## 2021-01-11 NOTE — ASSESSMENT & PLAN NOTE
Presented with shortness blood coupled w/ a nonproductive cough and wheezing  Improved after initiation of IV Solu-Medrol w/ plan for eventual taper -> Micah on board - c/w Zithromax  No formal diagnosis of COPD, however, in the clinical picture of significant tobacco abuse, seems likely -> Will require outpatient PFTs for formal diagnosis  Currently oxygenating on room air - respiratory protocol  With clinical improvement, consider transitioning to PO Prednisone tomorrow to initiate discharge planning

## 2021-01-11 NOTE — ASSESSMENT & PLAN NOTE
Likely reactive acute medical issue(s) exacerbated by corticosteroid administration  Monitor WBC count

## 2021-01-11 NOTE — PLAN OF CARE
Problem: RESPIRATORY - ADULT  Goal: Achieves optimal ventilation and oxygenation  Description: INTERVENTIONS:  - Assess for changes in respiratory status  - Assess for changes in mentation and behavior  - Position to facilitate oxygenation and minimize respiratory effort  - Oxygen administered by appropriate delivery if ordered  - Initiate smoking cessation education as indicated  - Encourage broncho-pulmonary hygiene including cough, deep breathe, Incentive Spirometry  - Assess the need for suctioning and aspirate as needed  - Assess and instruct to report SOB or any respiratory difficulty  - Respiratory Therapy support as indicated  Outcome: Progressing     Problem: PAIN - ADULT  Goal: Verbalizes/displays adequate comfort level or baseline comfort level  Description: Interventions:  - Encourage patient to monitor pain and request assistance  - Assess pain using appropriate pain scale  - Administer analgesics based on type and severity of pain and evaluate response  - Implement non-pharmacological measures as appropriate and evaluate response  - Notify physician/advanced practitioner if interventions unsuccessful or patient reports new pain  Outcome: Progressing     Problem: INFECTION - ADULT  Goal: Absence or prevention of progression during hospitalization  Description: INTERVENTIONS:  - Assess and monitor for signs and symptoms of infection  - Monitor lab/diagnostic results  - Monitor all insertion sites, i e  indwelling lines, tubes, and drains  - [place] appropriate cooling/warming therapies per order  - Administer medications as ordered  - Instruct and encourage patient and family to use good hand hygiene technique  - Identify and instruct in appropriate isolation precautions for identified infection/condition  Outcome: Progressing     Problem: SAFETY ADULT  Goal: Patient will remain free of falls  Description: INTERVENTIONS:  - Assess patient frequently for physical needs  -  Identify cognitive and physical deficits and behaviors that affect risk of falls  -  Mildred fall precautions as indicated by assessment   - Educate patient/family on patient safety including physical limitations  - Instruct patient to call for assistance with activity based on assessment  - Modify environment to reduce risk of injury  - Consider OT/PT consult to assist with strengthening/mobility  Outcome: Progressing     Problem: DISCHARGE PLANNING  Goal: Discharge to home or other facility with appropriate resources  Description: INTERVENTIONS:  - Identify barriers to discharge w/patient and caregiver  - Arrange for needed discharge resources and transportation as appropriate  - Identify discharge learning needs (meds, wound care, etc )  - Refer to Case Management Department for coordinating discharge planning if the patient needs post-hospital services based on physician/advanced practitioner order or complex needs related to functional status, cognitive ability, or social support system  Outcome: Progressing     Problem: Knowledge Deficit  Goal: Patient/family/caregiver demonstrates understanding of disease process, treatment plan, medications, and discharge instructions  Description: Complete learning assessment and assess knowledge base    Interventions:  - Provide teaching at level of understanding  - Provide teaching via preferred learning methods  Outcome: Progressing

## 2021-01-11 NOTE — UTILIZATION REVIEW
Initial Clinical Review    Admission: Date/Time/Statement:   Admission Orders (From admission, onward)     Ordered        01/09/21 1211  Inpatient Admission  Once                   Orders Placed This Encounter   Procedures    Inpatient Admission     Standing Status:   Standing     Number of Occurrences:   1     Order Specific Question:   Admitting Physician     Answer:   Júnior Ruiz [[id number]]     Order Specific Question:   Level of Care     Answer:   Med Surg [[id number]]     Order Specific Question:   Estimated length of stay     Answer:   More than 2 Midnights     Order Specific Question:   Certification     Answer:   I certify that inpatient services are medically necessary for this patient for a duration of greater than two midnights  See H&P and MD Progress Notes for additional information about the patient's course of treatment  ED Arrival Information     Expected Arrival Acuity Means of Arrival Escorted By Service Admission Type    - 1/9/2021 07:57 Urgent [facility] Hospitalist Urgent    Arrival Complaint    chest pain         Chief Complaint   Patient presents with    Rib Pain     Pt has c/o L rib pain for a week  Pt states he was coughing from his allergies and heard a "pop"  Assessment/Plan:   Mr Gino Robertson is a 61 yo male who presents to the ED via EMS from home with c/o SOB, wheezing, progressively worsening non-productive cough x 1 week and pleuritic chest pain unrelated to exertion  His sats were in mid 80s with exertion and WNL w/o exertion  PMH: Prostate CA, Type 2 NIDDM, HTN, HLD, tobacco abuse  He is admitted to INPATIENT status with Acute exacerbation COPD - IV steroids with maintenance taper on d/c, Neb therapy, need OP PFTs  NIDDM - SSI cover  HTN - Lisinopril, HCTZ  1/10 - pt is off oxygen with likely reactive leukocytosis r/t steroids  Trend WBC  Improvement in wheezing        ED Triage Vitals [01/09/21 0805]   Temperature Pulse Respirations Blood Pressure SpO2   97 8 °F (36 6 °C) 85 18 159/97 96 %      Temp Source Heart Rate Source Patient Position - Orthostatic VS BP Location FiO2 (%)   Oral Monitor Sitting Left arm --      Pain Score       Worst Possible Pain          Wt Readings from Last 1 Encounters:   01/09/21 79 8 kg (175 lb 14 8 oz)     Additional Vital Signs:   01/11/21 08:11:38  97 6 °F (36 4 °C)  78  17  127/77  94  97 %           01/10/21 22:10:51  98 1 °F (36 7 °C)  78  18  133/71  92  97 %           01/10/21 15:12:24  98 °F (36 7 °C)  88  18  145/91  109  94 %           01/10/21 0900            93 %  20  0 L/min  None (Room air)     01/10/21 08:24:38  97 5 °F (36 4 °C)      147/92  110             01/10/21 00:44:02  98 °F (36 7 °C)  87    146/92  110  90 %           01/09/21 23:34:38  98 5 °F (36 9 °C)  96  18  132/84  100  90 %           01/09/21 1710            91 %      None (Room air)     01/09/21 16:52:20  98 2 °F (36 8 °C)  84  20  151/82  105  91 %           01/09/21 1517            95 %      None (Room air)     01/09/21 1345    68  16      90 %  24  1 L/min  Nasal cannula     01/09/21 1315    72  16      92 %  24  1 L/min       01/09/21 1245    74  20      90 %           01/09/21 1000    74  20  125/75    96 %  24  1 L/min  Nasal cannula     01/09/21 0930    78  20      95 %           01/09/21 0915    80  20      94 %           01/09/21 0815    82  16  159/97  131  94 %             Pertinent Labs/Diagnostic Test Results:     1/9 CT chest - Atelectatic changes at the bases  No evidence of rib fracture or pneumothorax  Hyperdense lesions involving the left kidney with the largest measuring 1 8 cm  We will attempt to obtain prior studies otherwise further evaluation with nonemergent ultrasound or renal protocol CT may be useful      1/9 ECG - Normal sinus rhythm  Possible Inferior infarct (cited on or before 29-JUN-2006)  Abnormal ECG  When compared with ECG of 29-JUN-2006 08:47,  Questionable change in initial forces of Inferior leads    Results from last 7 days   Lab Units 01/09/21  0905   SARS-COV-2  Negative     Results from last 7 days   Lab Units 01/11/21  0600 01/10/21  0709 01/09/21  0905   WBC Thousand/uL 20 86* 15 11* 14 53*   HEMOGLOBIN g/dL  --  16 4 17 2*   HEMATOCRIT %  --  49 8* 53 0*   PLATELETS Thousands/uL  --  336 366   NEUTROS ABS Thousands/µL  --  12 58* 9 49*         Results from last 7 days   Lab Units 01/10/21  0709 01/09/21  0905   SODIUM mmol/L 137 139   POTASSIUM mmol/L 4 3 4 2   CHLORIDE mmol/L 108 109*   CO2 mmol/L 23 25   ANION GAP mmol/L 6 5   BUN mg/dL 24 23   CREATININE mg/dL 0 95 0 89   EGFR ml/min/1 73sq m 84 90   CALCIUM mg/dL 10 4* 10 6*     Results from last 7 days   Lab Units 01/09/21  0905   AST U/L 11   ALT U/L 25   ALK PHOS U/L 99   TOTAL PROTEIN g/dL 8 5*   ALBUMIN g/dL 4 4   TOTAL BILIRUBIN mg/dL 0 46   BILIRUBIN DIRECT mg/dL 0 14     Results from last 7 days   Lab Units 01/11/21  0811 01/10/21  2152 01/10/21  1639 01/10/21  1132 01/09/21  2131 01/09/21  1736 01/09/21  1256   POC GLUCOSE mg/dl 157* 223* 192* 250* 253* 151* 116     Results from last 7 days   Lab Units 01/10/21  0709 01/09/21  0905   GLUCOSE RANDOM mg/dL 190* 117     Results from last 7 days   Lab Units 01/09/21  1201 01/09/21  0905   TROPONIN I ng/mL <0 02 <0 02     Results from last 7 days   Lab Units 01/09/21  0905   D-DIMER QUANTITATIVE ug/ml FEU 0 28     Results from last 7 days   Lab Units 01/10/21  0709   PROCALCITONIN ng/ml <0 05     Results from last 7 days   Lab Units 01/10/21  0709   HEP C AB  Non-reactive     Results from last 7 days   Lab Units 01/09/21  0905   INFLUENZA A PCR  Negative   INFLUENZA B PCR  Negative   RSV PCR  Negative     Results from last 7 days   Lab Units 01/09/21 1936   SPUTUM CULTURE  2+ Growth of    GRAM STAIN RESULT  1+ Epithelial cells per low power field*  No polys seen*  1+ Gram positive cocci in pairs*  1+ Gram negative rods*     ED Treatment:   Medication Administration from 01/09/2021 0757 to 01/09/2021 1640    Date/Time Order Dose Route Action   01/09/2021 0913 acetaminophen (TYLENOL) tablet 975 mg 975 mg Oral Given   01/09/2021 0913 ketorolac (TORADOL) injection 15 mg 15 mg Intravenous Given   01/09/2021 0913 lidocaine (LIDODERM) 5 % patch 1 patch 1 patch Topical Medication Applied   01/09/2021 1159 morphine (PF) 4 mg/mL injection 4 mg 4 mg Intravenous Given   01/09/2021 1257 ceftriaxone (ROCEPHIN) 1 g/50 mL in dextrose IVPB 1,000 mg Intravenous New Bag   01/09/2021 1410 azithromycin (ZITHROMAX) 500 mg in sodium chloride 0 9% 250mL IVPB 500 mg 500 mg Intravenous New Bag   01/09/2021 1257 nicotine (NICODERM CQ) 14 mg/24hr TD 24 hr patch 1 patch 1 patch Transdermal Medication Applied   01/09/2021 1257 guaiFENesin (ROBITUSSIN) oral solution 200 mg 200 mg Oral Given   01/09/2021 1258 methylPREDNISolone sodium succinate (Solu-MEDROL) injection 40 mg 40 mg Intravenous Given        Past Medical History:   Diagnosis Date    Adenocarcinoma of prostate ([facility] [address] )     [id number]  LAST ASSESSED    Diabetes mellitus ([facility] [address] )     Hypertension      Present on Admission:   Diabetes mellitus type 2   Essential hypertension   Dyslipidemia    Admitting Diagnosis: Shortness of breath [R06 02]  Chest wall pain [R07 89]  Chest pain [R07 9]  Hypoxia [R09 02]  Left-sided chest wall pain [R07 89]     Age/Sex: 60 y o  male     Admission Orders:    Scheduled Medications:  amLODIPine, 10 mg, Oral, Daily  azithromycin, 500 mg, Oral, Q24H  fenofibrate, 145 mg, Oral, Daily  guaiFENesin, 600 mg, Oral, Q12H [facility]  heparin (porcine), 5,000 Units, Subcutaneous, Q8H ZAIRA  hydrochlorothiazide, 12 5 mg, Oral, Daily  insulin glargine, 5 Units, Subcutaneous, HS  insulin lispro, 1-5 Units, Subcutaneous, HS  insulin lispro, 1-6 Units, Subcutaneous, TID AC  lisinopril, 30 mg, Oral, Daily  loratadine, 10 mg, Oral, Daily  methylPREDNISolone sodium succinate, 40 mg, Intravenous, Q8H  nicotine, 1 patch, Transdermal, Daily      Continuous IV Infusions:     PRN Meds:  acetaminophen, 650 mg, Oral, Q6H PRN - x 1 1/10, x 1 1/11  albuterol, 2 puff, Inhalation, Q4H PRN  guaiFENesin, 200 mg, Oral, Q4H PRN - x 3 1/9, x 4 1/10, x 2 1/11  ibuprofen, 800 mg, Oral, Q8H PRN - x 1 1/9, x 2 1/10  ipratropium-albuterol, 3 mL, Nebulization, Q4H PRN    SCDs  NC oxygen at 2 L to keep sats > 88%  HOURLY INCENTIVE SPIROMETRY  POC GLUCOSE AC/HS WITH SSI COVERAGE   ADA diet   IP CONSULT TO CASE MANAGEMENT    Network Utilization Review Department  ATTENTION: Please call with any questions or concerns to [phone] and carefully listen to the prompts so that you are directed to the right person  All voicemails are confidential   Tarsha Sargent all requests for admission clinical reviews, approved or denied determinations and any other requests to dedicated fax number below belonging to the campus where the patient is receiving treatment   List of dedicated fax numbers for the Facilities:  [address] DENIALS (Administrative/Medical Necessity) [phone]   [address] (Maternity/NICU/Pediatrics) [phone]   [address] Jairo Lundy 7982 (Kim Ely "Cindy" 103) [address] [place] [address] Elizabeth Vazquez 14[address] [place] [address] 81 [phone]

## 2021-01-11 NOTE — DISCHARGE SUMMARY
Discharge Summary - [address] [facility]  Patient: Rodena Closs 60 y o  male   MRN: [id number]  PCP: Anselmo Burgos DO  Unit/Bed#: [facility] 921-01 Encounter: [id number]            Discharging Physician / Practitioner: Som Luz MD  PCP: Anselmo Burgos DO  Admission Date:   Admission Orders (From admission, onward)     Ordered        01/09/21 1211  Inpatient Admission  Once                   Discharge Date: 01/11/21      Reason for Admission:  Shortness of breath      Discharge Diagnoses:     Principal Problem:    COPD exacerbation    Active Problems:    Leukocytosis    Essential hypertension    Dyslipidemia    Tobacco abuse    Diabetes mellitus type 2      Consultations During Hospital Stay:  · None      Hospital Course:     * COPD exacerbation  Assessment & Plan  Presented with shortness blood coupled w/ a nonproductive cough and wheezing  Improved after initiation of IV Solu-Medrol w/transition to oral Prednisone for tapering course -> Duonebs  for on board - s/p Zithromax  No formal diagnosis of COPD, however, in the clinical picture of significant tobacco abuse, seems likely -> will require outpatient PFTs for formal diagnosis (defer to PCP)  Currently oxygenating on room air - respiratory protocol was ordered  Plan for discharge home today     Leukocytosis  Assessment & Plan  Likely reactive acute medical issue(s) exacerbated by corticosteroid administration     Essential hypertension  Assessment & Plan  Continue Norvasc/HCTZ/Zestril  Low-sodium diet encourage     Dyslipidemia  Assessment & Plan  Continue Fenofibrate     Tobacco abuse  Assessment & Plan  Smoking cessation counseling  Transdermal nicotine patch on board during inpatient course     Diabetes mellitus type 2  Assessment & Plan           Lab Results   Component Value Date     HGBA1C 6 2 (H) 12/12/2020      Held  Metformin while hospitalized - resume on discharge  Maintained on an insulin course while hospitalized        Condition at Discharge: fair       Discharge Day Visit / Exam:     Vitals: Blood Pressure: 127/77 (01/11/21 0811)  Pulse: 78 (01/11/21 0811)  Temperature: 97 6 °F (36 4 °C) (01/11/21 0811)  Temp Source: Oral (01/09/21 0805)  Respirations: 17 (01/11/21 0811)  Weight - Scale: 79 8 kg (175 lb 14 8 oz) (01/09/21 1652)  SpO2: 97 % (01/11/21 0445)      Physical exam - I had a face-to-face encounter with the patient on day of discharge  Discussion with Patient and/or Family:  The patient has been advised to return to the ER immediately if any symptoms recur or worsen  Discharge instructions/Information to Patient and/or Family:   See after visit summary for information provided to patient and/or family  Provisions for Follow-Up Care:  See after visit summary for information related to follow-up care and any pertinent home health orders  Disposition:   Home      Discharge Medications:  See after visit summary for reconciled discharge medications provided to patient and/or family  Discharge Statement:  I spent 38 minutes discharging the patient  This time was spent on the day of discharge  I had direct contact with the patient on the day of discharge  Greater than 50% of the total time was spent examining patient, answering all patient questions, arranging and discussing plan of care with patient as well as directly providing post-discharge instructions  Additional time then spent on discharge activities  ** Please Note: This note is constructed using a voice recognition dictation system  An occasional wrong word/phrase or sound-a-like substitution may have been picked up by dictation device due to the inherent limitations of voice recognition software  Read the chart carefully and recognize, using reasonable context, where substitutions may have occurred  **

## 2021-01-12 NOTE — UTILIZATION REVIEW
Notification of Discharge  This is a Notification of Discharge from our facility [address]  Please be advised that this patient has been discharge from our facility  Below you will find the admission and discharge date and time including the patients disposition  PRESENTATION DATE: 1/9/2021  7:58 AM  OBS ADMISSION DATE:   IP ADMISSION DATE: 1/9/21 1211   DISCHARGE DATE: 1/11/2021  1:07 PM  DISPOSITION: Home/Self Care Home/Self Care   Admission Orders listed below:  Admission Orders (From admission, onward)     Ordered        01/09/21 1211  Inpatient Admission  Once                   Please contact the UR Department if additional information is required to close this patient's authorization/case  [address] [facility] Utilization Review Department  Main: [phone] x carefully listen to the prompts  All voicemails are confidential   [email or web address]  [email or web address]  Send all requests for admission clinical reviews, approved or denied determinations and any other requests to dedicated fax number below belonging to the campus where the patient is receiving treatment   List of dedicated fax numbers:  [address] DENIALS (Administrative/Medical Necessity) [phone]   [address] (Maternity/NICU/Pediatrics) [phone]   Neno Michaud [phone]   True Constant [phone]   Bjorn Standing [phone]   [address] [phone]   [facility]  [phone]   [address] [facility] [address]  [address] [facility] [address] [facility] [phone]

## 2021-01-19 ENCOUNTER — OFFICE VISIT (OUTPATIENT)
Dept: INTERNAL MEDICINE CLINIC | Facility: CLINIC | Age: 66
End: 2021-01-19
Payer: COMMERCIAL

## 2021-01-19 VITALS
HEART RATE: 76 BPM | OXYGEN SATURATION: 97 % | SYSTOLIC BLOOD PRESSURE: 170 MMHG | HEIGHT: 69 IN | DIASTOLIC BLOOD PRESSURE: 90 MMHG | WEIGHT: 184 LBS | BODY MASS INDEX: 27.25 KG/M2 | TEMPERATURE: 96.9 F

## 2021-01-19 DIAGNOSIS — Z11.4 SCREENING FOR HIV (HUMAN IMMUNODEFICIENCY VIRUS): ICD-10-CM

## 2021-01-19 DIAGNOSIS — N28.9 KIDNEY LESION: ICD-10-CM

## 2021-01-19 DIAGNOSIS — E11.9 TYPE 2 DIABETES MELLITUS WITHOUT COMPLICATION, WITHOUT LONG-TERM CURRENT USE OF INSULIN (HCC): Primary | ICD-10-CM

## 2021-01-19 DIAGNOSIS — Z23 ENCOUNTER FOR IMMUNIZATION: ICD-10-CM

## 2021-01-19 DIAGNOSIS — D72.829 LEUKOCYTOSIS, UNSPECIFIED TYPE: ICD-10-CM

## 2021-01-19 DIAGNOSIS — J44.1 COPD EXACERBATION (HCC): ICD-10-CM

## 2021-01-19 DIAGNOSIS — J44.1 COPD WITH ACUTE EXACERBATION (HCC): ICD-10-CM

## 2021-01-19 PROCEDURE — 99495 TRANSJ CARE MGMT MOD F2F 14D: CPT | Performed by: INTERNAL MEDICINE

## 2021-01-19 RX ORDER — ALBUTEROL SULFATE 90 UG/1
2 AEROSOL, METERED RESPIRATORY (INHALATION) EVERY 6 HOURS PRN
Qty: 1 INHALER | Refills: 1 | Status: SHIPPED | OUTPATIENT
Start: 2021-01-19

## 2021-01-19 NOTE — ASSESSMENT & PLAN NOTE
I did review his hospitalization discharge summary days here for transition care management visit he is making significant improvements at this point time he will continue with the Mucinex as directed p r n  I will add ProAir HFA 2 puffs every 4 6 hours as needed for tightness and wheezing  Plenty of fluids and rest he does report me left-sided rib cage discomfort likely musculoskeletal/intercostal this point time will hold off on further imaging he did have a CT scan that did not reveal a fracture  Currently does have a splint, ice/heat does use a topical analgesia which is helpful will continue monitor his symptoms if his symptoms are not improving next several weeks or worsen he is to notify me and we will consider a bone scan at that point time    RTO in 2-3 months

## 2021-01-19 NOTE — ASSESSMENT & PLAN NOTE
Lab Results   Component Value Date    HGBA1C 6 2 (H) 12/12/2020    The patient does report me increased blood sugars in the last several weeks since being on steroids and prednisone he completed his last dose of prednisone yesterday; he will continue to monitor his blood sugars likely these will improve over the course next 1-2 weeks and will continue monitor his fasting blood sugar and A1c  I have instructed him to check blood sugar twice daily at 7:00 a m  And also pre dinner keep a log if blood sugars are elevated greater than 150 after week off of prednisone to notify me for adjustment of the metformin

## 2021-01-19 NOTE — PROGRESS NOTES
Assessment/Plan:     Diabetes mellitus type 2    Lab Results   Component Value Date    HGBA1C 6 2 (H) 12/12/2020    The patient does report me increased blood sugars in the last several weeks since being on steroids and prednisone he completed his last dose of prednisone yesterday; he will continue to monitor his blood sugars likely these will improve over the course next 1-2 weeks and will continue monitor his fasting blood sugar and A1c  I have instructed him to check blood sugar twice daily at 7:00 a m  And also pre dinner keep a log if blood sugars are elevated greater than 150 after week off of prednisone to notify me for adjustment of the metformin  Leukocytosis  Patient does have elevation of the white cell count likely secondary to the prednisone recheck CBC in 1 week to ensure resolution  COPD exacerbation  I did review his hospitalization discharge summary days here for transition care management visit he is making significant improvements at this point time he will continue with the Mucinex as directed p r n  I will add ProAir HFA 2 puffs every 4 6 hours as needed for tightness and wheezing  Plenty of fluids and rest he does report me left-sided rib cage discomfort likely musculoskeletal/intercostal this point time will hold off on further imaging he did have a CT scan that did not reveal a fracture  Currently does have a splint, ice/heat does use a topical analgesia which is helpful will continue monitor his symptoms if his symptoms are not improving next several weeks or worsen he is to notify me and we will consider a bone scan at that point time    RTO in 2-3 months         Problem List Items Addressed This Visit        Endocrine    Diabetes mellitus type 2 - Primary       Lab Results   Component Value Date    HGBA1C 6 2 (H) 12/12/2020    The patient does report me increased blood sugars in the last several weeks since being on steroids and prednisone he completed his last dose of prednisone yesterday; he will continue to monitor his blood sugars likely these will improve over the course next 1-2 weeks and will continue monitor his fasting blood sugar and A1c  I have instructed him to check blood sugar twice daily at 7:00 a m  And also pre dinner keep a log if blood sugars are elevated greater than 150 after week off of prednisone to notify me for adjustment of the metformin  Relevant Orders    Diabetic foot exam    Hemoglobin A1C    Lipid Panel with Direct LDL reflex    Glucometer    Lancets    Glucometer test strips       Respiratory    COPD exacerbation     I did review his hospitalization discharge summary days here for transition care management visit he is making significant improvements at this point time he will continue with the Mucinex as directed p r n  I will add ProAir HFA 2 puffs every 4 6 hours as needed for tightness and wheezing  Plenty of fluids and rest he does report me left-sided rib cage discomfort likely musculoskeletal/intercostal this point time will hold off on further imaging he did have a CT scan that did not reveal a fracture  Currently does have a splint, ice/heat does use a topical analgesia which is helpful will continue monitor his symptoms if his symptoms are not improving next several weeks or worsen he is to notify me and we will consider a bone scan at that point time  RTO in 2-3 months         Relevant Medications    albuterol (PROVENTIL HFA,VENTOLIN HFA) 90 mcg/act inhaler       Other    Leukocytosis     Patient does have elevation of the white cell count likely secondary to the prednisone recheck CBC in 1 week to ensure resolution           Relevant Orders    CBC (Includes Diff/Plt) (Refl)      Other Visit Diagnoses     Screening for HIV (human immunodeficiency virus)        Encounter for immunization        Kidney lesion        Relevant Orders    US kidney and bladder    COPD exacerbation ([facility] [address] )        Relevant Medications    albuterol (PROVENTIL HFA,VENTOLIN HFA) 90 mcg/act inhaler    Other Relevant Orders    Complete PFT without post bronchodilator         RTO in 2 months call if any problems  Subjective:     Patient ID: Donna Jimenez is a 60 y o  male  HPI 60year old male coming in for a transition care management visit regarding recent hospitalization for exacerbation of COPD today we did review his discharge summary, medication list test and laboratories completed in the hospital   He reports me his cough is improving his breathing is improving  CT scan did reveal a kidney lesion patient reports me had a kidney lesion many years ago  He reports me he has successfully quit smoking since this admission  He reports me completed the prednisone yesterday   Review of Systems   Constitutional: Negative for activity change, appetite change and unexpected weight change  HENT: Negative for congestion and postnasal drip  Eyes: Negative for visual disturbance  Respiratory: Positive for cough (Improving)  Negative for shortness of breath  Cardiovascular: Negative for chest pain  Gastrointestinal: Negative for abdominal pain, diarrhea, nausea and vomiting  Neurological: Negative for dizziness, light-headedness and headaches  Hematological: Negative for adenopathy  No follow-ups on file  Allergies   Allergen Reactions    Penicillins      Other reaction(s): Other (See Comments)  Unknown  Other reaction(s): Other (See Comments)  Unknown  Other reaction(s): Unknown  Category: Allergy;         Past Medical History:   Diagnosis Date    Adenocarcinoma of prostate [facility])     [id number]  LAST ASSESSED    Diabetes mellitus ([facility] [address] )     Hypertension      Past Surgical History:   Procedure Laterality Date    COLON SURGERY      HERNIA REPAIR      SHOULDER SURGERY      TONSILLECTOMY      TONSILLECTOMY AND ADENOIDECTOMY       Current Outpatient Medications on File Prior to Visit   Medication Sig Dispense Refill    amLODIPine (NORVASC) 10 mg tablet TAKE 1 TABLET DAILY 30 tablet 3    budesonide (RHINOCORT ALLERGY) 32 MCG/ACT nasal spray 1 spray into each nostril daily      Cholecalciferol (VITAMIN D3) 1000 units CAPS Take 1 capsule by mouth daily      cyanocobalamin (VITAMIN B-12) 1,000 mcg tablet Take by mouth daily      fenofibrate (TRICOR) 145 mg tablet TAKE 1 TABLET BY MOUTH EVERY DAY 30 tablet 5    hydrochlorothiazide (MICROZIDE) 12 5 mg capsule TAKE 1 CAPSULE DAILY 30 capsule 3    ibuprofen (MOTRIN) 800 mg tablet Take 800 mg by mouth every 8 (eight) hours as needed  0    lisinopril (ZESTRIL) 30 mg tablet TAKE 1 TABLET DAILY 30 tablet 3    metFORMIN (GLUCOPHAGE) 500 mg tablet TAKE 1 TABLET BY MOUTH TWICE A DAY WIHT MEALS  60 tablet 3    Multiple Vitamin (MULTIVITAMIN) tablet Take 1 tablet by mouth daily      Omega-3 Fatty Acids (FISH OIL) 1,000 mg Take 3 capsules by mouth daily      predniSONE 10 mg tablet Take 4 tablets (40 mg total) by mouth daily for 2 days, THEN 3 tablets (30 mg total) daily for 2 days, THEN 2 tablets (20 mg total) daily for 2 days, THEN 1 tablet (10 mg total) daily for 2 days  20 tablet 0    VITAMIN B COMPLEX-C CAPS Take 1 capsule by mouth daily       No current facility-administered medications on file prior to visit        Family History   Problem Relation Age of Onset    Diabetes Father         MELLITUS     Social History     Socioeconomic History    Marital status: /Civil Union     Spouse name: Not on file    Number of children: Not on file    Years of education: Not on file    Highest education level: Not on file   Occupational History    Not on file   Social Needs    Financial resource strain: Not on file    Food insecurity     Worry: Not on file     Inability: Not on file   [employer] needs     Medical: Not on file     Non-medical: Not on file   Tobacco Use    Smoking status: Current Every Day Smoker     Packs/day: 1 00     Types: Cigarettes    Smokeless tobacco: Never Used   Substance and Sexual Activity    Alcohol use: Not Currently     Comment: SOCIAL    Drug use: No    Sexual activity: Not on file   Lifestyle    Physical activity     Days per week: Not on file     Minutes per session: Not on file    Stress: Not on file   Relationships    Social connections     Talks on phone: Not on file     Gets together: Not on file     Attends Taoism service: Not on file     Active member of club or organization: Not on file     Attends meetings of clubs or organizations: Not on file     Relationship status: Not on file    Intimate partner violence     Fear of current or ex partner: Not on file     Emotionally abused: Not on file     Physically abused: Not on file     Forced sexual activity: Not on file   Other Topics Concern    Not on file   Social History Narrative    COPY OF ADVANCE DIRECTIVE OBTAINED     Vitals:    01/19/21 1034   BP: 170/90   Pulse: 76   Temp: (!) 96 9 °F (36 1 °C)   TempSrc: Temporal   SpO2: 97%   Weight: 83 5 kg (184 lb)   Height: 5' 9" (1 753 m)     Results for orders placed or performed during the hospital encounter of 01/09/21   COVID19, Influenza A/B, RSV PCR, [facility]    Specimen: Nose; Nares   Result Value Ref Range    SARS-CoV-2 Negative Negative    INFLUENZA A PCR Negative Negative    INFLUENZA B PCR Negative Negative    RSV PCR Negative Negative   Sputum culture and Gram stain    Specimen: Expectorated Sputum   Result Value Ref Range    Sputum Culture 2+ Growth of      Gram Stain Result 1+ Epithelial cells per low power field (A)     Gram Stain Result No polys seen (A)     Gram Stain Result 1+ Gram positive cocci in pairs (A)     Gram Stain Result 1+ Gram negative rods (A)    CBC and differential   Result Value Ref Range    WBC 14 53 (H) 4 31 - 10 16 Thousand/uL    RBC 6 06 (H) 3 88 - 5 62 Million/uL    Hemoglobin 17 2 (H) 12 0 - 17 0 g/dL    Hematocrit 53 0 (H) 36 5 - 49 3 %    MCV 88 82 - 98 fL    MCH 28 4 26 8 - 34 3 pg    MCHC 32 5 31 4 - 37 4 g/dL    RDW 14 0 11 6 - 15 1 %    MPV 9 0 8 9 - 12 7 fL    Platelets 585 949 - 319 Thousands/uL    nRBC 0 /100 WBCs    Neutrophils Relative 66 43 - 75 %    Immat GRANS % 0 0 - 2 %    Lymphocytes Relative 24 14 - 44 %    Monocytes Relative 9 4 - 12 %    Eosinophils Relative 1 0 - 6 %    Basophils Relative 0 0 - 1 %    Neutrophils Absolute 9 49 (H) 1 85 - 7 62 Thousands/µL    Immature Grans Absolute 0 05 0 00 - 0 20 Thousand/uL    Lymphocytes Absolute 3 42 0 60 - 4 47 Thousands/µL    Monocytes Absolute 1 37 (H) 0 17 - 1 22 Thousand/µL    Eosinophils Absolute 0 14 0 00 - 0 61 Thousand/µL    Basophils Absolute 0 06 0 00 - 0 10 Thousands/µL   Basic metabolic panel   Result Value Ref Range    Sodium 139 136 - 145 mmol/L    Potassium 4 2 3 5 - 5 3 mmol/L    Chloride 109 (H) 100 - 108 mmol/L    CO2 25 21 - 32 mmol/L    ANION GAP 5 4 - 13 mmol/L    BUN 23 5 - 25 mg/dL    Creatinine 0 89 0 60 - 1 30 mg/dL    Glucose 117 65 - 140 mg/dL    Calcium 10 6 (H) 8 3 - 10 1 mg/dL    eGFR 90 ml/min/1 73sq m   D-Dimer   Result Value Ref Range    D-Dimer, Quant 0 28 <0 50 ug/ml FEU   Troponin I   Result Value Ref Range    Troponin I <0 02 <=0 04 ng/mL   Hepatic function panel   Result Value Ref Range    Total Bilirubin 0 46 0 20 - 1 00 mg/dL    Bilirubin, Direct 0 14 0 00 - 0 20 mg/dL    Alkaline Phosphatase 99 46 - 116 U/L    AST 11 5 - 45 U/L    ALT 25 12 - 78 U/L    Total Protein 8 5 (H) 6 4 - 8 2 g/dL    Albumin 4 4 3 5 - 5 0 g/dL   Troponin I   Result Value Ref Range    Troponin I <0 02 <=0 04 ng/mL   CBC and differential   Result Value Ref Range    WBC 15 11 (H) 4 31 - 10 16 Thousand/uL    RBC 5 64 (H) 3 88 - 5 62 Million/uL    Hemoglobin 16 4 12 0 - 17 0 g/dL    Hematocrit 49 8 (H) 36 5 - 49 3 %    MCV 88 82 - 98 fL    MCH 29 1 26 8 - 34 3 pg    MCHC 32 9 31 4 - 37 4 g/dL    RDW 14 3 11 6 - 15 1 %    MPV 9 2 8 9 - 12 7 fL    Platelets 476 031 - 060 Thousands/uL    nRBC 0 /100 WBCs    Neutrophils Relative 84 (H) 43 - 75 %    Immat GRANS % 0 0 - 2 %    Lymphocytes Relative 13 (L) 14 - 44 %    Monocytes Relative 3 (L) 4 - 12 %    Eosinophils Relative 0 0 - 6 %    Basophils Relative 0 0 - 1 %    Neutrophils Absolute 12 58 (H) 1 85 - 7 62 Thousands/µL    Immature Grans Absolute 0 06 0 00 - 0 20 Thousand/uL    Lymphocytes Absolute 1 98 0 60 - 4 47 Thousands/µL    Monocytes Absolute 0 47 0 17 - 1 22 Thousand/µL    Eosinophils Absolute 0 00 0 00 - 0 61 Thousand/µL    Basophils Absolute 0 02 0 00 - 0 10 Thousands/µL   Basic metabolic panel   Result Value Ref Range    Sodium 137 136 - 145 mmol/L    Potassium 4 3 3 5 - 5 3 mmol/L    Chloride 108 100 - 108 mmol/L    CO2 23 21 - 32 mmol/L    ANION GAP 6 4 - 13 mmol/L    BUN 24 5 - 25 mg/dL    Creatinine 0 95 0 60 - 1 30 mg/dL    Glucose 190 (H) 65 - 140 mg/dL    Calcium 10 4 (H) 8 3 - 10 1 mg/dL    eGFR 84 ml/min/1 73sq m   Procalcitonin with AM Reflex   Result Value Ref Range    Procalcitonin <0 05 <=0 25 ng/ml   Hepatitis C antibody   Result Value Ref Range    Hepatitis C Ab Non-reactive Non-reactive   WBC   Result Value Ref Range    WBC 20 86 (H) 4 31 - 10 16 Thousand/uL   ECG 12 lead   Result Value Ref Range    Ventricular Rate 71 BPM    Atrial Rate 71 BPM    CA Interval 138 ms    QRSD Interval 100 ms    QT Interval 374 ms    QTC Interval 406 ms    P Axis 47 degrees    QRS Axis 89 degrees    T Wave Axis 40 degrees   ECG 12 lead   Result Value Ref Range    Ventricular Rate 75 BPM    Atrial Rate 75 BPM    CA Interval 176 ms    QRSD Interval 98 ms    QT Interval 360 ms    QTC Interval 402 ms    P Axis 74 degrees    QRS Axis 81 degrees    T Wave Axis 53 degrees   Fingerstick Glucose (POCT)   Result Value Ref Range    POC Glucose 116 65 - 140 mg/dl   Fingerstick Glucose (POCT)   Result Value Ref Range    POC Glucose 151 (H) 65 - 140 mg/dl   Fingerstick Glucose (POCT)   Result Value Ref Range    POC Glucose 253 (H) 65 - 140 mg/dl   Fingerstick Glucose (POCT)   Result Value Ref Range    POC Glucose 250 (H) 65 - 140 mg/dl   Fingerstick Glucose (POCT)   Result Value Ref Range    POC Glucose 192 (H) 65 - 140 mg/dl   Fingerstick Glucose (POCT)   Result Value Ref Range    POC Glucose 223 (H) 65 - 140 mg/dl   Fingerstick Glucose (POCT)   Result Value Ref Range    POC Glucose 157 (H) 65 - 140 mg/dl   Fingerstick Glucose (POCT)   Result Value Ref Range    POC Glucose 181 (H) 65 - 140 mg/dl     Weight (last 2 days)     Date/Time   Weight    01/19/21 1034   83 5 (184)            Body mass index is 27 17 kg/m²  BP      Temp     Pulse     Resp      SpO2        Vitals:    01/19/21 1034   Weight: 83 5 kg (184 lb)     Vitals:    01/19/21 1034   Weight: 83 5 kg (184 lb)     Objective:     Physical Exam  Constitutional:       General: He is not in acute distress  Appearance: He is well-developed  He is not diaphoretic  HENT:      Head: Normocephalic and atraumatic  Right Ear: External ear normal       Left Ear: External ear normal    Eyes:      General: No scleral icterus  Right eye: No discharge  Left eye: No discharge  Conjunctiva/sclera: Conjunctivae normal       Pupils: Pupils are equal, round, and reactive to light  Neck:      Musculoskeletal: Neck supple  Cardiovascular:      Rate and Rhythm: Normal rate and regular rhythm  Heart sounds: Normal heart sounds  No murmur  No friction rub  No gallop  Pulmonary:      Effort: No respiratory distress  Breath sounds: Wheezing (Slight expiratory wheeze) present  No rales  Abdominal:      General: Bowel sounds are normal  There is no distension  Palpations: Abdomen is soft  There is no mass  Tenderness: There is no abdominal tenderness  There is no guarding or rebound  Musculoskeletal:         General: No deformity  Lymphadenopathy:      Cervical: No cervical adenopathy  Neurological:      Mental Status: He is alert         examination there is no point tenderness over the rib there is no tenderness over the intercostal muscle but when he takes a deep breath or cough he developed pain at the site of the rib and intercostal muscle left-sided lower  Vitals:    01/19/21 1034   BP: 170/90   Pulse: 76   Temp: (!) 96 9 °F (36 1 °C)   TempSrc: Temporal   SpO2: 97%   Weight: 83 5 kg (184 lb)   Height: 5' 9" (1 753 m)       Transitional Care Management Review:  Tracy North is a 60 y o  male here for TCM follow up  During the TCM phone call patient stated:    TCM Call (since 12/19/2020)     Date and time call was made  1/11/2021  3:05 PM    Hospital care reviewed  Records reviewed    Patient was hospitial[facility] at  [facility]        Date of Admission  01/09/21    Date of discharge  01/11/21    Diagnosis  COPD    Disposition  Home    Were the patients medications reviewed and updated  No    Current Symptoms  -- (Comment)  Pain when coughing      TCM Call (since 12/19/2020)     Post hospital issues  None    Should patient be enrolled in anticoag monitoring? No    Scheduled for follow up?   Yes    Did you obtain your prescribed medications  Yes    Do you need help managing your prescriptions or medications  No    I have advised the patient to call PCP with any new or worsening symptoms  Nelda Montes, MR    Are you recieving any outpatient services  No    Are you recieving home care services  No    Are you using any community resources  No    Have you fallen in the last 12 months  No    Interperter language line needed  No    Counseling  Patient          Juan Carlos Tirado, DO

## 2021-01-19 NOTE — ASSESSMENT & PLAN NOTE
Patient does have elevation of the white cell count likely secondary to the prednisone recheck CBC in 1 week to ensure resolution

## 2021-02-04 ENCOUNTER — TELEMEDICINE (OUTPATIENT)
Dept: INTERNAL MEDICINE CLINIC | Facility: CLINIC | Age: 66
End: 2021-02-04
Payer: COMMERCIAL

## 2021-02-04 VITALS — TEMPERATURE: 97.6 F

## 2021-02-04 DIAGNOSIS — R19.7 DIARRHEA, UNSPECIFIED TYPE: Primary | ICD-10-CM

## 2021-02-04 PROCEDURE — 99213 OFFICE O/P EST LOW 20 MIN: CPT | Performed by: NURSE PRACTITIONER

## 2021-02-04 NOTE — PROGRESS NOTES
Virtual Brief Visit    Assessment/Plan:    Problem List Items Addressed This Visit     None      Visit Diagnoses     Diarrhea, unspecified type    -  Primary    Relevant Orders    Ambulatory referral to Gastroenterology    Stool culture    Clostridium difficile toxin by PCR                Reason for visit is   Chief Complaint   Patient presents with    Virtual Brief Visit        Encounter provider RUSS March    Provider located at [address] [place] [id number]    Recent Visits  No visits were found meeting these conditions  Showing recent visits within past 7 days and meeting all other requirements     Today's Visits  Date Type Provider Dept   02/04/21 Telemedicine Deedee March today's visits and meeting all other requirements     Future Appointments  No visits were found meeting these conditions  Showing future appointments within next 150 days and meeting all other requirements        After connecting through [employer] and patient was informed that this is a secure, HIPAA-compliant platform  He agrees to proceed  , the patient was identified by name and date of birth  Yuli Al was informed that this is a telemedicine visit and that the visit is being conducted through [facility] and patient was informed that this is a secure, HIPAA-compliant platform  He agrees to proceed     My office door was closed  No one else was in the room  He acknowledged consent and understanding of privacy and security of the platform  The patient has agreed to participate and understands he can discontinue the visit at any time  Patient is aware this is a billable service  Kenton Ybarra is a 60 y o  male     Patient co watery diarrhea for a week  No blood   No fever or chills  Has lower abdominal cramping    Taking peptol bismol and kaopectate for diarrhea         Past Medical History:   Diagnosis Date    Adenocarcinoma of prostate [facility])     [id number]  LAST ASSESSED    Diabetes mellitus ([facility] [address] )     Hypertension        Past Surgical History:   Procedure Laterality Date    COLON SURGERY      HERNIA REPAIR      SHOULDER SURGERY      TONSILLECTOMY      TONSILLECTOMY AND ADENOIDECTOMY         Current Outpatient Medications   Medication Sig Dispense Refill    albuterol (PROVENTIL HFA,VENTOLIN HFA) 90 mcg/act inhaler Inhale 2 puffs every 6 (six) hours as needed for wheezing 1 Inhaler 1    amLODIPine (NORVASC) 10 mg tablet TAKE 1 TABLET DAILY 30 tablet 3    Cholecalciferol (VITAMIN D3) 1000 units CAPS Take 1 capsule by mouth daily      cyanocobalamin (VITAMIN B-12) 1,000 mcg tablet Take by mouth daily      fenofibrate (TRICOR) 145 mg tablet TAKE 1 TABLET BY MOUTH EVERY DAY 30 tablet 5    hydrochlorothiazide (MICROZIDE) 12 5 mg capsule TAKE 1 CAPSULE DAILY 30 capsule 3    ibuprofen (MOTRIN) 800 mg tablet Take 800 mg by mouth every 8 (eight) hours as needed  0    lisinopril (ZESTRIL) 30 mg tablet TAKE 1 TABLET DAILY 30 tablet 3    metFORMIN (GLUCOPHAGE) 500 mg tablet TAKE 1 TABLET BY MOUTH TWICE A DAY WIHT MEALS  60 tablet 3    Multiple Vitamin (MULTIVITAMIN) tablet Take 1 tablet by mouth daily      VITAMIN B COMPLEX-C CAPS Take 1 capsule by mouth daily      budesonide (RHINOCORT ALLERGY) 32 MCG/ACT nasal spray 1 spray into each nostril daily      Omega-3 Fatty Acids (FISH OIL) 1,000 mg Take 3 capsules by mouth daily       No current facility-administered medications for this visit  Allergies   Allergen Reactions    Penicillins      Other reaction(s): Other (See Comments)  Unknown  Other reaction(s): Other (See Comments)  Unknown  Other reaction(s): Unknown  Category: Allergy; Review of Systems   Constitutional: Negative  HENT: Negative  Eyes: Negative  Respiratory: Negative  Cardiovascular: Negative  Gastrointestinal: Positive for abdominal pain and diarrhea  Musculoskeletal: Negative  Neurological: Negative  BMI Counseling: There is no height or weight on file to calculate BMI  The BMI is above normal  Nutrition recommendations include reducing fast food intake, consuming healthier snacks and decreasing soda and/or juice intake  Vitals:    02/04/21 1038   Temp: 97 6 °F (36 4 °C)         I spent 15 minutes directly with the patient during this visit    [address] acknowledges that he has consented to an online visit or consultation  He understands that the online visit is based solely on information provided by him, and that, in the absence of a face-to-face physical evaluation by the physician, the diagnosis he receives is both limited and provisional in terms of accuracy and completeness  This is not intended to replace a full medical face-to-face evaluation by the physician  Chepe Parker understands and accepts these terms

## 2021-02-04 NOTE — ASSESSMENT & PLAN NOTE
Most likely viral gastroenteritis based on symptoms  Stool tests ordered  Go to ER if having worsening pain and/or fever, chills, blood in the stool

## 2021-02-05 ENCOUNTER — LAB (OUTPATIENT)
Dept: LAB | Age: 66
End: 2021-02-05
Payer: COMMERCIAL

## 2021-02-05 DIAGNOSIS — R19.7 DIARRHEA, UNSPECIFIED TYPE: ICD-10-CM

## 2021-02-05 PROCEDURE — 87505 NFCT AGENT DETECTION GI: CPT

## 2021-02-06 LAB — C DIFF TOX B TCDB STL QL NAA+PROBE: NEGATIVE

## 2021-02-07 LAB
CAMPYLOBACTER DNA SPEC NAA+PROBE: NORMAL
SALMONELLA DNA SPEC QL NAA+PROBE: NORMAL
SHIGA TOXIN STX GENE SPEC NAA+PROBE: NORMAL
SHIGELLA DNA SPEC QL NAA+PROBE: NORMAL

## 2021-02-14 DIAGNOSIS — E13.9 DIABETES 1.5, MANAGED AS TYPE 2 (HCC): ICD-10-CM

## 2021-02-14 DIAGNOSIS — I10 ESSENTIAL HYPERTENSION: ICD-10-CM

## 2021-02-14 PROCEDURE — 4010F ACE/ARB THERAPY RXD/TAKEN: CPT | Performed by: INTERNAL MEDICINE

## 2021-02-14 RX ORDER — AMLODIPINE BESYLATE 10 MG/1
TABLET ORAL
Qty: 30 TABLET | Refills: 3 | Status: SHIPPED | OUTPATIENT
Start: 2021-02-14 | End: 2021-08-23

## 2021-02-14 RX ORDER — HYDROCHLOROTHIAZIDE 12.5 MG/1
CAPSULE, GELATIN COATED ORAL
Qty: 30 CAPSULE | Refills: 3 | Status: SHIPPED | OUTPATIENT
Start: 2021-02-14 | End: 2021-07-16

## 2021-02-14 RX ORDER — LISINOPRIL 30 MG/1
TABLET ORAL
Qty: 30 TABLET | Refills: 3 | Status: SHIPPED | OUTPATIENT
Start: 2021-02-14 | End: 2021-07-16

## 2021-03-11 DIAGNOSIS — E78.1 HYPERTRIGLYCERIDEMIA: ICD-10-CM

## 2021-03-11 RX ORDER — FENOFIBRATE 145 MG/1
TABLET, COATED ORAL
Qty: 30 TABLET | Refills: 5 | Status: SHIPPED | OUTPATIENT
Start: 2021-03-11 | End: 2021-12-05

## 2021-03-22 ENCOUNTER — TRANSCRIBE ORDERS (OUTPATIENT)
Dept: ADMINISTRATIVE | Age: 66
End: 2021-03-22

## 2021-03-22 ENCOUNTER — APPOINTMENT (OUTPATIENT)
Dept: LAB | Age: 66
End: 2021-03-22
Payer: COMMERCIAL

## 2021-03-22 ENCOUNTER — OFFICE VISIT (OUTPATIENT)
Dept: URGENT CARE | Age: 66
End: 2021-03-22
Payer: COMMERCIAL

## 2021-03-22 VITALS
HEART RATE: 88 BPM | DIASTOLIC BLOOD PRESSURE: 88 MMHG | TEMPERATURE: 97.4 F | RESPIRATION RATE: 18 BRPM | OXYGEN SATURATION: 97 % | SYSTOLIC BLOOD PRESSURE: 142 MMHG

## 2021-03-22 DIAGNOSIS — D72.829 LEUKOCYTOSIS, UNSPECIFIED TYPE: ICD-10-CM

## 2021-03-22 DIAGNOSIS — E11.9 TYPE 2 DIABETES MELLITUS WITHOUT COMPLICATION, WITHOUT LONG-TERM CURRENT USE OF INSULIN (HCC): ICD-10-CM

## 2021-03-22 DIAGNOSIS — L98.9 ARM SKIN LESION, RIGHT: Primary | ICD-10-CM

## 2021-03-22 LAB
BASOPHILS # BLD AUTO: 0.07 THOUSANDS/ΜL (ref 0–0.1)
BASOPHILS NFR BLD AUTO: 1 % (ref 0–1)
CHOLEST SERPL-MCNC: 209 MG/DL (ref 50–200)
EOSINOPHIL # BLD AUTO: 0.19 THOUSAND/ΜL (ref 0–0.61)
EOSINOPHIL NFR BLD AUTO: 2 % (ref 0–6)
ERYTHROCYTE [DISTWIDTH] IN BLOOD BY AUTOMATED COUNT: 14.1 % (ref 11.6–15.1)
EST. AVERAGE GLUCOSE BLD GHB EST-MCNC: 134 MG/DL
HBA1C MFR BLD: 6.3 %
HCT VFR BLD AUTO: 49.6 % (ref 36.5–49.3)
HDLC SERPL-MCNC: 43 MG/DL
HGB BLD-MCNC: 16.4 G/DL (ref 12–17)
IMM GRANULOCYTES # BLD AUTO: 0.03 THOUSAND/UL (ref 0–0.2)
IMM GRANULOCYTES NFR BLD AUTO: 0 % (ref 0–2)
LDLC SERPL CALC-MCNC: 134 MG/DL (ref 0–100)
LYMPHOCYTES # BLD AUTO: 3.87 THOUSANDS/ΜL (ref 0.6–4.47)
LYMPHOCYTES NFR BLD AUTO: 31 % (ref 14–44)
MCH RBC QN AUTO: 29.5 PG (ref 26.8–34.3)
MCHC RBC AUTO-ENTMCNC: 33.1 G/DL (ref 31.4–37.4)
MCV RBC AUTO: 89 FL (ref 82–98)
MONOCYTES # BLD AUTO: 1.3 THOUSAND/ΜL (ref 0.17–1.22)
MONOCYTES NFR BLD AUTO: 11 % (ref 4–12)
NEUTROPHILS # BLD AUTO: 6.86 THOUSANDS/ΜL (ref 1.85–7.62)
NEUTS SEG NFR BLD AUTO: 55 % (ref 43–75)
NRBC BLD AUTO-RTO: 0 /100 WBCS
PLATELET # BLD AUTO: 351 THOUSANDS/UL (ref 149–390)
PMV BLD AUTO: 9.1 FL (ref 8.9–12.7)
RBC # BLD AUTO: 5.56 MILLION/UL (ref 3.88–5.62)
TRIGL SERPL-MCNC: 161 MG/DL
WBC # BLD AUTO: 12.32 THOUSAND/UL (ref 4.31–10.16)

## 2021-03-22 PROCEDURE — 83036 HEMOGLOBIN GLYCOSYLATED A1C: CPT

## 2021-03-22 PROCEDURE — S9083 URGENT CARE CENTER GLOBAL: HCPCS | Performed by: NURSE PRACTITIONER

## 2021-03-22 PROCEDURE — 3044F HG A1C LEVEL LT 7.0%: CPT | Performed by: INTERNAL MEDICINE

## 2021-03-22 PROCEDURE — 85025 COMPLETE CBC W/AUTO DIFF WBC: CPT

## 2021-03-22 PROCEDURE — G0382 LEV 3 HOSP TYPE B ED VISIT: HCPCS | Performed by: NURSE PRACTITIONER

## 2021-03-22 PROCEDURE — 80061 LIPID PANEL: CPT

## 2021-03-22 PROCEDURE — 36415 COLL VENOUS BLD VENIPUNCTURE: CPT

## 2021-03-22 NOTE — PROGRESS NOTES
[facility]        NAME: Donna Jimenez is a 60 y o  male  : 1955    MRN: [id number]  DATE: 2021  TIME: 8:26 AM    Assessment and Plan   Arm skin lesion, right [L98 9]  1  Arm skin lesion, right           Patient Instructions     F/u with PCP  Will decide whether an U/S is appropriate or not  Follow up with PCP in 1-5 days  Proceed to  ER if symptoms worsen  Chief Complaint     Chief Complaint   Patient presents with    Arm Pain     right upper, with lump noted x 1 month , after using a shot gun for hunting          History of Present Illness       HPI   Reports feeling a lump on the right upper arm  Duration 1 month  States he had gone hunting about a month ago and had some bruising on the right upper arm  All that resolved but he has noticed a feeling as though there is a pea-sized lesion in the muscle of the right upper arm  Denies any pain  Previous Hx of R and L shoulder surgery  Review of Systems   Review of Systems   Constitutional: Negative for chills and fever  Musculoskeletal: Negative for arthralgias and myalgias  Skin: Negative for color change, rash and wound  Neurological: Negative for tremors, weakness and numbness           Current Medications       Current Outpatient Medications:     albuterol (PROVENTIL HFA,VENTOLIN HFA) 90 mcg/act inhaler, Inhale 2 puffs every 6 (six) hours as needed for wheezing, Disp: 1 Inhaler, Rfl: 1    amLODIPine (NORVASC) 10 mg tablet, TAKE 1 TABLET BY MOUTH EVERY DAY, Disp: 30 tablet, Rfl: 3    budesonide (RHINOCORT ALLERGY) 32 MCG/ACT nasal spray, 1 spray into each nostril daily, Disp: , Rfl:     Cholecalciferol (VITAMIN D3) 1000 units CAPS, Take 1 capsule by mouth daily, Disp: , Rfl:     cyanocobalamin (VITAMIN B-12) 1,000 mcg tablet, Take by mouth daily, Disp: , Rfl:     fenofibrate (TRICOR) 145 mg tablet, TAKE 1 TABLET BY MOUTH EVERY DAY, Disp: 30 tablet, Rfl: 5    hydrochlorothiazide (MICROZIDE) 12 5 mg capsule, TAKE 1 CAPSULE BY MOUTH EVERY DAY, Disp: 30 capsule, Rfl: 3    ibuprofen (MOTRIN) 800 mg tablet, Take 800 mg by mouth every 8 (eight) hours as needed, Disp: , Rfl: 0    lisinopril (ZESTRIL) 30 mg tablet, TAKE 1 TABLET BY MOUTH EVERY DAY, Disp: 30 tablet, Rfl: 3    metFORMIN (GLUCOPHAGE) 500 mg tablet, TAKE 1 TABLET BY MOUTH TWICE A DAY WITH MEALS, Disp: 60 tablet, Rfl: 3    Multiple Vitamin (MULTIVITAMIN) tablet, Take 1 tablet by mouth daily, Disp: , Rfl:     Omega-3 Fatty Acids (FISH OIL) 1,000 mg, Take 3 capsules by mouth daily, Disp: , Rfl:     VITAMIN B COMPLEX-C CAPS, Take 1 capsule by mouth daily, Disp: , Rfl:     Current Allergies     Allergies as of 03/22/2021 - Reviewed 02/04/2021   Allergen Reaction Noted    Penicillins  08/01/2012            The following portions of the patient's history were reviewed and updated as appropriate: allergies, current medications, past family history, past medical history, past social history, past surgical history and problem list      Past Medical History:   Diagnosis Date    [facility] )     [id number]  LAST ASSESSED    Diabetes mellitus ([facility] )     Hypertension        Past Surgical History:   Procedure Laterality Date    COLON SURGERY      HERNIA REPAIR      SHOULDER SURGERY      TONSILLECTOMY      TONSILLECTOMY AND ADENOIDECTOMY         Family History   Problem Relation Age of Onset    Diabetes Father         MELLITUS         Medications have been verified  Objective   /88   Pulse 88   Temp (!) 97 4 °F (36 3 °C)   Resp 18   SpO2 97%   No LMP for male patient  Physical Exam     Physical Exam  Musculoskeletal:         General: No swelling or tenderness  Comments: Palpation of the right upper arm, medial to the bicep  Movable  No pain  No skin discoloration  Surface of the skin is normal  ROM of the R arm is normal   is normal  Strength is normal     Skin:     Findings: No bruising, erythema or rash

## 2021-03-29 ENCOUNTER — OFFICE VISIT (OUTPATIENT)
Dept: INTERNAL MEDICINE CLINIC | Facility: CLINIC | Age: 66
End: 2021-03-29
Payer: COMMERCIAL

## 2021-03-29 VITALS
RESPIRATION RATE: 16 BRPM | HEART RATE: 77 BPM | DIASTOLIC BLOOD PRESSURE: 78 MMHG | OXYGEN SATURATION: 97 % | SYSTOLIC BLOOD PRESSURE: 134 MMHG | BODY MASS INDEX: 26.81 KG/M2 | HEIGHT: 69 IN | WEIGHT: 181 LBS

## 2021-03-29 DIAGNOSIS — Z23 ENCOUNTER FOR IMMUNIZATION: ICD-10-CM

## 2021-03-29 DIAGNOSIS — Z11.4 SCREENING FOR HIV (HUMAN IMMUNODEFICIENCY VIRUS): ICD-10-CM

## 2021-03-29 DIAGNOSIS — E78.5 DYSLIPIDEMIA: ICD-10-CM

## 2021-03-29 DIAGNOSIS — I10 ESSENTIAL HYPERTENSION: ICD-10-CM

## 2021-03-29 DIAGNOSIS — E78.1 HYPERTRIGLYCERIDEMIA: ICD-10-CM

## 2021-03-29 DIAGNOSIS — E78.5 HYPERLIPIDEMIA, UNSPECIFIED HYPERLIPIDEMIA TYPE: ICD-10-CM

## 2021-03-29 DIAGNOSIS — E66.3 OVERWEIGHT (BMI 25.0-29.9): ICD-10-CM

## 2021-03-29 DIAGNOSIS — E11.9 TYPE 2 DIABETES MELLITUS WITHOUT COMPLICATION, WITHOUT LONG-TERM CURRENT USE OF INSULIN (HCC): Primary | ICD-10-CM

## 2021-03-29 DIAGNOSIS — Z12.5 SCREENING PSA (PROSTATE SPECIFIC ANTIGEN): ICD-10-CM

## 2021-03-29 DIAGNOSIS — J30.2 SEASONAL ALLERGIES: ICD-10-CM

## 2021-03-29 PROCEDURE — 3008F BODY MASS INDEX DOCD: CPT | Performed by: INTERNAL MEDICINE

## 2021-03-29 PROCEDURE — 99214 OFFICE O/P EST MOD 30 MIN: CPT | Performed by: INTERNAL MEDICINE

## 2021-03-29 NOTE — ASSESSMENT & PLAN NOTE
Hypertension - controlled, I have counseled patient following healthy balance diet, I would like the patient reduce sodium, exercise routinely, I would like the patient continued the med current medical regiment and we will continue to monitor    Continue hydrochlorothiazide 12 5 mg once daily, lisinopril 30 mg once daily, amlodipine 10 mg once daily

## 2021-03-29 NOTE — PROGRESS NOTES
Assessment/Plan:    Diabetes mellitus type 2    Lab Results   Component Value Date    HGBA1C 6 3 (H) 03/22/2021    I have counselled the pt to follow a healthy and balanced diet ,and recommend routine exercise  I will be ordering diabetic laboratories including comprehensive metabolic panel, hemoglobin A1c, urine microalbumin, lipid panel  Essential hypertension    Hypertension - controlled, I have counseled patient following healthy balance diet, I would like the patient reduce sodium, exercise routinely, I would like the patient continued the med current medical regiment and we will continue to monitor  Continue hydrochlorothiazide 12 5 mg once daily, lisinopril 30 mg once daily, amlodipine 10 mg once daily    Dyslipidemia   Hyperlipidemia controlled continue with current medical regiment recommend a low-cholesterol diet and recommend routine exercise we will continue to monitor the progress  Continue Tricor 145 mg once daily    Hypertriglyceridemia   Decrease carbohydrates/decrease sweets    Overweight (BMI 25 0-29  9)   I have counselled the pt to follow a healthy and balanced diet ,and recommend routine exercise  Problem List Items Addressed This Visit        Endocrine    Diabetes mellitus type 2 - Primary       Lab Results   Component Value Date    HGBA1C 6 3 (H) 03/22/2021    I have counselled the pt to follow a healthy and balanced diet ,and recommend routine exercise  I will be ordering diabetic laboratories including comprehensive metabolic panel, hemoglobin A1c, urine microalbumin, lipid panel           Relevant Orders    Comprehensive metabolic panel    Hemoglobin A1C    Lipid Panel with Direct LDL reflex    Microalbumin / creatinine urine ratio       Cardiovascular and Mediastinum    Essential hypertension       Hypertension - controlled, I have counseled patient following healthy balance diet, I would like the patient reduce sodium, exercise routinely, I would like the patient continued the med current medical regiment and we will continue to monitor  Continue hydrochlorothiazide 12 5 mg once daily, lisinopril 30 mg once daily, amlodipine 10 mg once daily            Other    Dyslipidemia      Hyperlipidemia controlled continue with current medical regiment recommend a low-cholesterol diet and recommend routine exercise we will continue to monitor the progress  Continue Tricor 145 mg once daily         Overweight (BMI 25 0-29  9)      I have counselled the pt to follow a healthy and balanced diet ,and recommend routine exercise  Hypertriglyceridemia      Decrease carbohydrates/decrease sweets         Screening for HIV (human immunodeficiency virus)      Other Visit Diagnoses     Encounter for immunization        Screening PSA (prostate specific antigen)        Relevant Orders    PSA, Total Screen    Seasonal allergies        Relevant Medications    budesonide (RINOCORT AQUA) 32 MCG/ACT nasal spray    Hyperlipidemia, unspecified hyperlipidemia type               RTO in 6 months call if any problems  Subjective:      Patient ID: Deshawn Rendon is a 60 y o  male  HPI  60-year old male coming in for a follow up visit regarding type 2 diabetes, allergies, hypertriglyceridemia, hypertension, overweight; The patient reports me compliant taking medications without untoward side effects the  The patient is here to review his medical condition, update me on the medical condition and the patient reports me no hospitalizations and no ER visits  He reports me overall is doing well no major injuries or illnesses he has noticed some mild allergies including runny nose and postnasal drip requesting treatment  patient reports me that his intention is to quit smoking down the road he would like to slowly wean off the cigarettes he has gone from 1 pack per day down to about 5 cigarettes per day at this point time he would prefer not to set a quit date but he is going to slowly work at weaning off of the cigarettes    The following portions of the patient's history were reviewed and updated as appropriate: allergies, current medications, past family history, past medical history, past social history, past surgical history and problem list     Review of Systems   Constitutional: Negative for activity change, appetite change and unexpected weight change  HENT: Positive for postnasal drip and rhinorrhea  Negative for congestion  Eyes: Negative for visual disturbance  Respiratory: Negative for cough and shortness of breath  Cardiovascular: Negative for chest pain  Gastrointestinal: Negative for abdominal pain, diarrhea, nausea and vomiting  Neurological: Negative for dizziness, light-headedness and headaches  Objective:    No follow-ups on file  No results found  Allergies   Allergen Reactions    Penicillins      Other reaction(s): Other (See Comments)  Unknown  Other reaction(s): Other (See Comments)  Unknown  Other reaction(s): Unknown  Category: Allergy;         Past Medical History:   Diagnosis Date    Adenocarcinoma of prostate [facility])     [id number]  LAST ASSESSED    [facility] mellitus [facility] [address] )     Hypertension      Past Surgical History:   Procedure Laterality Date    COLON SURGERY      HERNIA REPAIR      SHOULDER SURGERY      TONSILLECTOMY      TONSILLECTOMY AND ADENOIDECTOMY       Current Outpatient Medications on File Prior to Visit   Medication Sig Dispense Refill    albuterol (PROVENTIL HFA,VENTOLIN HFA) 90 mcg/act inhaler Inhale 2 puffs every 6 (six) hours as needed for wheezing 1 Inhaler 1    amLODIPine (NORVASC) 10 mg tablet TAKE 1 TABLET BY MOUTH EVERY DAY 30 tablet 3    budesonide (RHINOCORT ALLERGY) 32 MCG/ACT nasal spray 1 spray into each nostril daily      Cholecalciferol (VITAMIN D3) 1000 units CAPS Take 1 capsule by mouth daily      cyanocobalamin (VITAMIN B-12) 1,000 mcg tablet Take by mouth daily      fenofibrate (TRICOR) 145 mg tablet TAKE 1 TABLET BY MOUTH EVERY DAY 30 tablet 5    hydrochlorothiazide (MICROZIDE) 12 5 mg capsule TAKE 1 CAPSULE BY MOUTH EVERY DAY 30 capsule 3    ibuprofen (MOTRIN) 800 mg tablet Take 800 mg by mouth every 8 (eight) hours as needed  0    lisinopril (ZESTRIL) 30 mg tablet TAKE 1 TABLET BY MOUTH EVERY DAY 30 tablet 3    metFORMIN (GLUCOPHAGE) 500 mg tablet TAKE 1 TABLET BY MOUTH TWICE A DAY WITH MEALS 60 tablet 3    Multiple Vitamin (MULTIVITAMIN) tablet Take 1 tablet by mouth daily      Omega-3 Fatty Acids (FISH OIL) 1,000 mg Take 3 capsules by mouth daily      VITAMIN B COMPLEX-C CAPS Take 1 capsule by mouth daily       No current facility-administered medications on file prior to visit        Family History   Problem Relation Age of Onset    Diabetes Father         MELLITUS     Social History     Socioeconomic History    Marital status: /Civil Union     Spouse name: Not on file    Number of children: Not on file    Years of education: Not on file    Highest education level: Not on file   Occupational History    Not on file   Social Needs    Financial resource strain: Not on file    Food insecurity     Worry: Not on file     Inability: Not on file   [employer] needs     Medical: Not on file     Non-medical: Not on file   Tobacco Use    Smoking status: Current Every Day Smoker     Packs/day: 1 00     Types: Cigarettes    Smokeless tobacco: Never Used   Substance and Sexual Activity    Alcohol use: Not Currently     Comment: SOCIAL    Drug use: No    Sexual activity: Not on file   Lifestyle    Physical activity     Days per week: Not on file     Minutes per session: Not on file    Stress: Not on file   Relationships    Social connections     Talks on phone: Not on file     Gets together: Not on file     Attends [employer] service: Not on file     Active member of club or organization: Not on file     Attends meetings of clubs or organizations: Not on file     Relationship status: Not on file    Intimate partner violence     Fear of current or ex partner: Not on file     Emotionally abused: Not on file     Physically abused: Not on file     Forced sexual activity: Not on file   Other Topics Concern    Not on file   Social History Narrative    COPY OF [facility] [address]:    03/29/21 0820   BP: 134/78   Pulse: 77   Resp: 16   SpO2: 97%   Weight: 82 1 kg (181 lb)   Height: 5' 9" (1 753 m)     Results for orders placed or performed in visit on 03/22/21   Hemoglobin A1C   Result Value Ref Range    Hemoglobin A1C 6 3 (H) Normal 3 8-5 6%; PreDiabetic 5 7-6 4%; Diabetic >=6 5%; Glycemic control for adults with diabetes <7 0% %     mg/dl   Lipid Panel with Direct LDL reflex   Result Value Ref Range    Cholesterol 209 (H) 50 - 200 mg/dL    Triglycerides 161 (H) <=150 mg/dL    HDL, Direct 43 >=40 mg/dL    LDL Calculated 134 (H) 0 - 100 mg/dL   CBC and differential   Result Value Ref Range    WBC 12 32 (H) 4 31 - 10 16 Thousand/uL    RBC 5 56 3 88 - 5 62 Million/uL    Hemoglobin 16 4 12 0 - 17 0 g/dL    Hematocrit 49 6 (H) 36 5 - 49 3 %    MCV 89 82 - 98 fL    MCH 29 5 26 8 - 34 3 pg    MCHC 33 1 31 4 - 37 4 g/dL    RDW 14 1 11 6 - 15 1 %    MPV 9 1 8 9 - 12 7 fL    Platelets 192 027 - 647 Thousands/uL    nRBC 0 /100 WBCs    Neutrophils Relative 55 43 - 75 %    Immat GRANS % 0 0 - 2 %    Lymphocytes Relative 31 14 - 44 %    Monocytes Relative 11 4 - 12 %    Eosinophils Relative 2 0 - 6 %    Basophils Relative 1 0 - 1 %    Neutrophils Absolute 6 86 1 85 - 7 62 Thousands/µL    Immature Grans Absolute 0 03 0 00 - 0 20 Thousand/uL    Lymphocytes Absolute 3 87 0 60 - 4 47 Thousands/µL    Monocytes Absolute 1 30 (H) 0 17 - 1 22 Thousand/µL    Eosinophils Absolute 0 19 0 00 - 0 61 Thousand/µL    Basophils Absolute 0 07 0 00 - 0 10 Thousands/µL     Weight (last 2 days)     Date/Time   Weight    03/29/21 0820   82 1 (181)            Body mass index is 26 73 kg/m²    BP      Temp      Pulse     Resp      SpO2 Vitals:    03/29/21 0820   Weight: 82 1 kg (181 lb)     Vitals:    03/29/21 0820   Weight: 82 1 kg (181 lb)       /78   Pulse 77   Resp 16   Ht 5' 9" (1 753 m)   Wt 82 1 kg (181 lb)   SpO2 97%   BMI 26 73 kg/m²          Physical Exam  Constitutional:       General: He is not in acute distress  Appearance: He is well-developed  He is not diaphoretic  HENT:      Head: Normocephalic and atraumatic  Right Ear: External ear normal       Left Ear: External ear normal    Eyes:      General: No scleral icterus  Right eye: No discharge  Left eye: No discharge  Conjunctiva/sclera: Conjunctivae normal       Pupils: Pupils are equal, round, and reactive to light  Neck:      Musculoskeletal: Neck supple  Cardiovascular:      Rate and Rhythm: Normal rate and regular rhythm  Heart sounds: Normal heart sounds  No murmur  No friction rub  No gallop  Pulmonary:      Effort: No respiratory distress  Breath sounds: No wheezing or rales  Abdominal:      General: Bowel sounds are normal  There is no distension  Palpations: Abdomen is soft  There is no mass  Tenderness: There is no abdominal tenderness  There is no guarding or rebound  Musculoskeletal:         General: No deformity  Lymphadenopathy:      Cervical: No cervical adenopathy  Neurological:      Mental Status: He is alert

## 2021-03-29 NOTE — ASSESSMENT & PLAN NOTE
Lab Results   Component Value Date    HGBA1C 6 3 (H) 03/22/2021    I have counselled the pt to follow a healthy and balanced diet ,and recommend routine exercise  I will be ordering diabetic laboratories including comprehensive metabolic panel, hemoglobin A1c, urine microalbumin, lipid panel

## 2021-03-29 NOTE — ASSESSMENT & PLAN NOTE
Hyperlipidemia controlled continue with current medical regiment recommend a low-cholesterol diet and recommend routine exercise we will continue to monitor the progress   Continue Tricor 145 mg once daily

## 2021-07-16 DIAGNOSIS — I10 ESSENTIAL HYPERTENSION: ICD-10-CM

## 2021-07-16 DIAGNOSIS — E13.9 DIABETES 1.5, MANAGED AS TYPE 2 (HCC): ICD-10-CM

## 2021-07-16 RX ORDER — HYDROCHLOROTHIAZIDE 12.5 MG/1
CAPSULE, GELATIN COATED ORAL
Qty: 30 CAPSULE | Refills: 3 | Status: SHIPPED | OUTPATIENT
Start: 2021-07-16 | End: 2021-12-05

## 2021-07-16 RX ORDER — LISINOPRIL 30 MG/1
TABLET ORAL
Qty: 30 TABLET | Refills: 3 | Status: SHIPPED | OUTPATIENT
Start: 2021-07-16 | End: 2021-12-05

## 2021-08-02 ENCOUNTER — HOSPITAL ENCOUNTER (OUTPATIENT)
Dept: RADIOLOGY | Facility: IMAGING CENTER | Age: 66
Discharge: HOME | End: 2021-08-02
Payer: COMMERCIAL

## 2021-08-02 DIAGNOSIS — M79.672 LEFT FOOT PAIN: ICD-10-CM

## 2021-08-02 DIAGNOSIS — M79.671 RIGHT FOOT PAIN: ICD-10-CM

## 2021-08-02 PROCEDURE — 73630 X-RAY EXAM OF FOOT: CPT

## 2021-08-21 DIAGNOSIS — I10 ESSENTIAL HYPERTENSION: ICD-10-CM

## 2021-08-23 RX ORDER — AMLODIPINE BESYLATE 10 MG/1
TABLET ORAL
Qty: 30 TABLET | Refills: 3 | Status: SHIPPED | OUTPATIENT
Start: 2021-08-23 | End: 2022-01-09

## 2021-08-30 ENCOUNTER — RA CDI HCC (OUTPATIENT)
Dept: OTHER | Facility: HOSPITAL | Age: 66
End: 2021-08-30

## 2021-08-30 NOTE — PROGRESS NOTES
[place] [id number]  coding opportunities             Chart Reviewed * (Number of) Inbasket suggestions sent to Provider: 1            Number of suggestions used: 0      Number of suggestions NOT actually used: 1     Patients insurance company: [employer] (Medicare Advantage and Commercial)     Visit status: Patient arrived for their scheduled appointment   dx sent not on bill--used the E119  Provider never responded to [facility]  coding request     [place] [id number]  coding opportunities             Chart Reviewed * (Number of) Inbasket suggestions sent to Provider: 1      DX: not on problem list  E11 36-Type 2 diabetes mellitus with diabetic cataract--nuclear sclerosis noted on recent opth   Note-ou--auto-link per coding guidelines/coding clinic updates for DM and cataracts            Patients insurance company: [employer] ([employer])

## 2021-09-01 ENCOUNTER — APPOINTMENT (OUTPATIENT)
Dept: LAB | Age: 66
End: 2021-09-01
Payer: COMMERCIAL

## 2021-09-01 DIAGNOSIS — Z12.5 SCREENING PSA (PROSTATE SPECIFIC ANTIGEN): ICD-10-CM

## 2021-09-01 DIAGNOSIS — Z11.4 SCREENING FOR HUMAN IMMUNODEFICIENCY VIRUS: ICD-10-CM

## 2021-09-01 DIAGNOSIS — E11.9 TYPE 2 DIABETES MELLITUS WITHOUT COMPLICATION, WITHOUT LONG-TERM CURRENT USE OF INSULIN (HCC): ICD-10-CM

## 2021-09-01 LAB
ALBUMIN SERPL BCP-MCNC: 3.9 G/DL (ref 3.5–5)
ALP SERPL-CCNC: 86 U/L (ref 46–116)
ALT SERPL W P-5'-P-CCNC: 23 U/L (ref 12–78)
ANION GAP SERPL CALCULATED.3IONS-SCNC: 7 MMOL/L (ref 4–13)
AST SERPL W P-5'-P-CCNC: 16 U/L (ref 5–45)
BILIRUB SERPL-MCNC: 0.5 MG/DL (ref 0.2–1)
BUN SERPL-MCNC: 22 MG/DL (ref 5–25)
CALCIUM SERPL-MCNC: 9.7 MG/DL (ref 8.3–10.1)
CHLORIDE SERPL-SCNC: 110 MMOL/L (ref 100–108)
CHOLEST SERPL-MCNC: 172 MG/DL (ref 50–200)
CO2 SERPL-SCNC: 22 MMOL/L (ref 21–32)
CREAT SERPL-MCNC: 0.8 MG/DL (ref 0.6–1.3)
CREAT UR-MCNC: 93.8 MG/DL
EST. AVERAGE GLUCOSE BLD GHB EST-MCNC: 131 MG/DL
GFR SERPL CREATININE-BSD FRML MDRD: 94 ML/MIN/1.73SQ M
GLUCOSE P FAST SERPL-MCNC: 95 MG/DL (ref 65–99)
HBA1C MFR BLD: 6.2 %
HDLC SERPL-MCNC: 38 MG/DL
LDLC SERPL CALC-MCNC: 106 MG/DL (ref 0–100)
MICROALBUMIN UR-MCNC: 7.2 MG/L (ref 0–20)
MICROALBUMIN/CREAT 24H UR: 8 MG/G CREATININE (ref 0–30)
POTASSIUM SERPL-SCNC: 4.2 MMOL/L (ref 3.5–5.3)
PROT SERPL-MCNC: 7.8 G/DL (ref 6.4–8.2)
PSA SERPL-MCNC: 3.4 NG/ML (ref 0–4)
SODIUM SERPL-SCNC: 139 MMOL/L (ref 136–145)
TRIGL SERPL-MCNC: 140 MG/DL

## 2021-09-01 PROCEDURE — G0103 PSA SCREENING: HCPCS

## 2021-09-01 PROCEDURE — 36415 COLL VENOUS BLD VENIPUNCTURE: CPT

## 2021-09-01 PROCEDURE — 87389 HIV-1 AG W/HIV-1&-2 AB AG IA: CPT

## 2021-09-01 PROCEDURE — 80053 COMPREHEN METABOLIC PANEL: CPT

## 2021-09-01 PROCEDURE — 82043 UR ALBUMIN QUANTITATIVE: CPT

## 2021-09-01 PROCEDURE — 83036 HEMOGLOBIN GLYCOSYLATED A1C: CPT

## 2021-09-01 PROCEDURE — 82570 ASSAY OF URINE CREATININE: CPT

## 2021-09-01 PROCEDURE — 80061 LIPID PANEL: CPT

## 2021-09-02 LAB — HIV 1+2 AB+HIV1 P24 AG SERPL QL IA: NORMAL

## 2021-09-07 ENCOUNTER — OFFICE VISIT (OUTPATIENT)
Dept: INTERNAL MEDICINE CLINIC | Facility: CLINIC | Age: 66
End: 2021-09-07
Payer: COMMERCIAL

## 2021-09-07 VITALS
DIASTOLIC BLOOD PRESSURE: 64 MMHG | SYSTOLIC BLOOD PRESSURE: 134 MMHG | RESPIRATION RATE: 16 BRPM | OXYGEN SATURATION: 96 % | BODY MASS INDEX: 26.96 KG/M2 | HEART RATE: 81 BPM | HEIGHT: 69 IN | WEIGHT: 182 LBS

## 2021-09-07 DIAGNOSIS — Z12.5 SCREENING PSA (PROSTATE SPECIFIC ANTIGEN): ICD-10-CM

## 2021-09-07 DIAGNOSIS — Z12.11 SCREENING FOR COLORECTAL CANCER: ICD-10-CM

## 2021-09-07 DIAGNOSIS — Z23 ENCOUNTER FOR IMMUNIZATION: ICD-10-CM

## 2021-09-07 DIAGNOSIS — Z11.4 SCREENING FOR HIV (HUMAN IMMUNODEFICIENCY VIRUS): ICD-10-CM

## 2021-09-07 DIAGNOSIS — Z12.12 SCREENING FOR COLORECTAL CANCER: ICD-10-CM

## 2021-09-07 DIAGNOSIS — E11.9 TYPE 2 DIABETES MELLITUS WITHOUT COMPLICATION, WITHOUT LONG-TERM CURRENT USE OF INSULIN (HCC): Primary | ICD-10-CM

## 2021-09-07 PROBLEM — Z00.00 WELLNESS EXAMINATION: Status: ACTIVE | Noted: 2021-09-07

## 2021-09-07 PROCEDURE — 3078F DIAST BP <80 MM HG: CPT | Performed by: INTERNAL MEDICINE

## 2021-09-07 PROCEDURE — 3008F BODY MASS INDEX DOCD: CPT | Performed by: INTERNAL MEDICINE

## 2021-09-07 PROCEDURE — 99396 PREV VISIT EST AGE 40-64: CPT | Performed by: INTERNAL MEDICINE

## 2021-09-07 PROCEDURE — 90715 TDAP VACCINE 7 YRS/> IM: CPT

## 2021-09-07 PROCEDURE — 3075F SYST BP GE 130 - 139MM HG: CPT | Performed by: INTERNAL MEDICINE

## 2021-09-07 PROCEDURE — 3725F SCREEN DEPRESSION PERFORMED: CPT | Performed by: INTERNAL MEDICINE

## 2021-09-07 PROCEDURE — 90471 IMMUNIZATION ADMIN: CPT

## 2021-09-07 NOTE — ASSESSMENT & PLAN NOTE
Assessment and plan 1  Health maintenance annual wellness examination overall the patient is clinically stable and doing well, we encouraged the patient to follow a healthy and balanced diet  We recommend that the patient exercise routinely approximately 30 minutes 5 times per week   We have reviewed the patient's vaccines and have made recommendations for updates if necessary  Annual flu shot advised we did  regarding the COVID vaccine he declines but may consider in the future       We will be ordering screening laboratories which are age appropriate  Return to the office in   6 months    call if any problems

## 2021-09-07 NOTE — PROGRESS NOTES
Assessment/Plan:    Wellness examination   Assessment and plan 1  Health maintenance annual wellness examination overall the patient is clinically stable and doing well, we encouraged the patient to follow a healthy and balanced diet  We recommend that the patient exercise routinely approximately 30 minutes 5 times per week   We have reviewed the patient's vaccines and have made recommendations for updates if necessary  Annual flu shot advised we did  regarding the COVID vaccine he declines but may consider in the future       We will be ordering screening laboratories which are age appropriate  Return to the office in   6 months    call if any problems  Problem List Items Addressed This Visit        Endocrine    Diabetes mellitus type 2 - Primary    Relevant Orders    Diabetic foot exam    Comprehensive metabolic panel    Hemoglobin A1C    Lipid Panel with Direct LDL reflex    Microalbumin / creatinine urine ratio       Other    Screening for HIV (human immunodeficiency virus)    Relevant Orders    HIV 1/2 Antigen/Antibody (4th Generation) w Reflex [facility]      Other Visit Diagnoses     Encounter for immunization        Relevant Orders    TDAP VACCINE GREATER THAN OR EQUAL TO 6YO IM (Completed)    Screening for colorectal cancer                Subjective:      Patient ID: Gini Haywood is a 60 y o  male      HPI annual physical , drives car  safely,  Wears a seatbelt does not use sunscreen but not in sun for long period time he reports trying to follow healthy  And  Balance diet remains active sees a dentist routinely brushes Adriana Josephady is teeth routinely has a smoke alarm, fire extinguisher and carbon monoxide  Brushes/gargles, no fall, will get tkhe flu shot declines cov vaccine,     The following portions of the patient's history were reviewed and updated as appropriate: allergies, current medications, past family history, past medical history, past social history, past surgical history and problem list     Review of Systems      Objective:    No follow-ups on file  No results found  Allergies   Allergen Reactions    Penicillins      Other reaction(s): Other (See Comments)  Unknown  Other reaction(s): Other (See Comments)  Unknown  Other reaction(s): Unknown  Category: Allergy;         Past Medical History:   Diagnosis Date    Adenocarcinoma of prostate [facility])     [id number]  LAST ASSESSED    Diabetes mellitus ([facility] [address] )     Hypertension      Past Surgical History:   Procedure Laterality Date    COLON SURGERY      HERNIA REPAIR      SHOULDER SURGERY      TONSILLECTOMY      TONSILLECTOMY AND ADENOIDECTOMY       Current Outpatient Medications on File Prior to Visit   Medication Sig Dispense Refill    albuterol (PROVENTIL HFA,VENTOLIN HFA) 90 mcg/act inhaler Inhale 2 puffs every 6 (six) hours as needed for wheezing 1 Inhaler 1    amLODIPine (NORVASC) 10 mg tablet TAKE 1 TABLET BY MOUTH EVERY DAY 30 tablet 3    budesonide (RHINOCORT ALLERGY) 32 MCG/ACT nasal spray 1 spray into each nostril daily      budesonide (RINOCORT AQUA) 32 MCG/ACT nasal spray 1 spray into each nostril daily 1 Bottle 4    Cholecalciferol (VITAMIN D3) 1000 units CAPS Take 1 capsule by mouth daily      cyanocobalamin (VITAMIN B-12) 1,000 mcg tablet Take by mouth daily      fenofibrate (TRICOR) 145 mg tablet TAKE 1 TABLET BY MOUTH EVERY DAY 30 tablet 5    hydrochlorothiazide (MICROZIDE) 12 5 mg capsule TAKE 1 CAPSULE BY MOUTH EVERY DAY 30 capsule 3    ibuprofen (MOTRIN) 800 mg tablet Take 800 mg by mouth every 8 (eight) hours as needed  0    lisinopril (ZESTRIL) 30 mg tablet TAKE 1 TABLET BY MOUTH EVERY DAY 30 tablet 3    metFORMIN (GLUCOPHAGE) 500 mg tablet TAKE 1 TABLET BY MOUTH TWICE A DAY WITH MEALS 60 tablet 3    Multiple Vitamin (MULTIVITAMIN) tablet Take 1 tablet by mouth daily      Omega-3 Fatty Acids (FISH OIL) 1,000 mg Take 3 capsules by mouth daily      VITAMIN B COMPLEX-C CAPS Take 1 capsule by mouth daily       No current facility-administered medications on file prior to visit  Family History   Problem Relation Age of Onset    Diabetes Father         MELLITUS     Social History     Socioeconomic History    Marital status: /Civil Union     Spouse name: Not on file    Number of children: Not on file    Years of education: Not on file    Highest education level: Not on file   Occupational History    Not on file   Tobacco Use    Smoking status: Current Every Day Smoker     Packs/day: 1 00     Types: Cigarettes    Smokeless tobacco: Never Used   Vaping Use    Vaping Use: Never used   Substance and Sexual Activity    Alcohol use: Not Currently     Comment: SOCIAL    Drug use: No    Sexual activity: Not on file   Other Topics Concern    Not on file   Social History Narrative    COPY OF ADVANCE DIRECTIVE OBTAINED     Social Determinants of Health     Financial Resource Strain:     Difficulty of Paying Living Expenses:    Food Insecurity:     Worried About Running Out of Food in the Last Year:     Ran Out of Food in the Last Year:    Transportation Needs:     Lack of Transportation (Medical):      Lack of Transportation (Non-Medical):    Physical Activity:     Days of Exercise per Week:     Minutes of Exercise per Session:    Stress:     Feeling of Stress :    Social Connections:     Frequency of Communication with Friends and Family:     Frequency of Social Gatherings with Friends and Family:     Attends [employer] Services:     Active Member of Clubs or Organizations:     Attends Club or Organization Meetings:     Marital Status:    Intimate Partner Violence:     Fear of Current or Ex-Partner:     Emotionally Abused:     Physically Abused:     Sexually Abused:      Vitals:    09/07/21 0933   BP: 134/64   Pulse: 81   Resp: 16   SpO2: 96%   Weight: 82 6 kg (182 lb)   Height: 5' 9" (1 753 m)     Results for orders placed or performed in visit on 09/01/21   Comprehensive metabolic panel   Result Value Ref Range    Sodium 139 136 - 145 mmol/L    Potassium 4 2 3 5 - 5 3 mmol/L    Chloride 110 (H) 100 - 108 mmol/L    CO2 22 21 - 32 mmol/L    ANION GAP 7 4 - 13 mmol/L    BUN 22 5 - 25 mg/dL    Creatinine 0 80 0 60 - 1 30 mg/dL    Glucose, Fasting 95 65 - 99 mg/dL    Calcium 9 7 8 3 - 10 1 mg/dL    AST 16 5 - 45 U/L    ALT 23 12 - 78 U/L    Alkaline Phosphatase 86 46 - 116 U/L    Total Protein 7 8 6 4 - 8 2 g/dL    Albumin 3 9 3 5 - 5 0 g/dL    Total Bilirubin 0 50 0 20 - 1 00 mg/dL    eGFR 94 ml/min/1 73sq m   Hemoglobin A1C   Result Value Ref Range    Hemoglobin A1C 6 2 (H) Normal 3 8-5 6%; PreDiabetic 5 7-6 4%; Diabetic >=6 5%; Glycemic control for adults with diabetes <7 0% %     mg/dl   Lipid Panel with Direct LDL reflex   Result Value Ref Range    Cholesterol 172 50 - 200 mg/dL    Triglycerides 140 <=150 mg/dL    HDL, Direct 38 (L) >=40 mg/dL    LDL Calculated 106 (H) 0 - 100 mg/dL   Microalbumin / creatinine urine ratio   Result Value Ref Range    Creatinine, Ur 93 8 mg/dL    Microalbum  ,U,Random 7 2 0 0 - 20 0 mg/L    Microalb Creat Ratio 8 0 - 30 mg/g creatinine   PSA, Total Screen   Result Value Ref Range    PSA 3 4 0 0 - 4 0 ng/mL   HIV 1/2 ANTIGEN/ANTIBODY (4TH GENERATION) W REFLEX [facility]   Result Value Ref Range    HIV-1/HIV-2 Ab Non-Reactive Non-Reactive     Weight (last 2 days)     Date/Time   Weight    09/07/21 0933   82 6 (182)            Body mass index is 26 88 kg/m²  BP      Temp      Pulse     Resp      SpO2        Vitals:    09/07/21 0933   Weight: 82 6 kg (182 lb)     Vitals:    09/07/21 0933   Weight: 82 6 kg (182 lb)       /64   Pulse 81   Resp 16   Ht 5' 9" (1 753 m)   Wt 82 6 kg (182 lb)   SpO2 96%   BMI 26 88 kg/m²          Physical Exam  Constitutional:       General: He is not in acute distress  Appearance: He is well-developed  He is not diaphoretic  HENT:      Head: Normocephalic and atraumatic        Right Ear: External ear normal       Left Ear: External ear normal    Eyes:      General: No scleral icterus  Right eye: No discharge  Left eye: No discharge  Conjunctiva/sclera: Conjunctivae normal       Pupils: Pupils are equal, round, and reactive to light  Cardiovascular:      Rate and Rhythm: Normal rate and regular rhythm  Pulses: no weak pulses          Dorsalis pedis pulses are 2+ on the right side  Posterior tibial pulses are 2+ on the right side and 2+ on the left side  Heart sounds: Normal heart sounds  No murmur heard  No friction rub  No gallop  Pulmonary:      Effort: No respiratory distress  Breath sounds: No wheezing or rales  Abdominal:      General: Bowel sounds are normal  There is no distension  Palpations: Abdomen is soft  There is no mass  Tenderness: There is no abdominal tenderness  There is no guarding or rebound  Musculoskeletal:         General: No deformity  Cervical back: Neck supple  Feet:      Right foot:      Skin integrity: No ulcer, skin breakdown, erythema, warmth, callus or dry skin  Left foot:      Skin integrity: No ulcer, skin breakdown, erythema, warmth, callus or dry skin  Lymphadenopathy:      Cervical: No cervical adenopathy  Neurological:      Mental Status: He is alert  Diabetic Foot Exam    Patient's shoes and socks removed  Right Foot/Ankle   Right Foot Inspection  Skin Exam: skin normal and skin intact no dry skin, no warmth, no callus, no erythema, no maceration, no abnormal color, no pre-ulcer, no ulcer and no callus                          Toe Exam: ROM and strength within normal limits  Sensory       Monofilament testing: intact  Vascular  Capillary refills: < 3 seconds  The right DP pulse is 2+  The right PT pulse is 2+       Left Foot/Ankle  Left Foot Inspection  Skin Exam: skin normal and skin intactno dry skin, no warmth, no erythema, no maceration, normal color, no pre-ulcer, no ulcer and no callus Toe Exam: ROM and strength within normal limits                   Sensory       Monofilament: intact  Vascular  Capillary refills: < 3 seconds  The left PT pulse is 2+  Assign Risk Category:  No deformity present; No loss of protective sensation;  No weak pulses       Risk: 0

## 2021-09-09 ENCOUNTER — TELEPHONE (OUTPATIENT)
Dept: INTERNAL MEDICINE CLINIC | Facility: CLINIC | Age: 66
End: 2021-09-09

## 2021-09-09 NOTE — TELEPHONE ENCOUNTER
Call placed to patient to review metformin per PCP, as patient is uncertain that metformin will be covered  Also reviewed to determine if SGLT2i would be beneficial for patient  Noted patient's last a1c was < 6 5% on current metformin dose  Could consider SGLT2i to assist with weight loss and blood pressure control       Unable to reach, VM full and unable to leave message

## 2021-10-07 NOTE — TELEPHONE ENCOUNTER
Called patient to review  Patient has not tried filling metformin, informed him medication is consider lowest-cost medication for diabetes mgmt  Patient will contact PCP office if cost concerns when he fills meds

## 2021-12-05 DIAGNOSIS — I10 ESSENTIAL HYPERTENSION: ICD-10-CM

## 2021-12-05 DIAGNOSIS — E78.1 HYPERTRIGLYCERIDEMIA: ICD-10-CM

## 2021-12-05 DIAGNOSIS — E13.9 DIABETES 1.5, MANAGED AS TYPE 2 (HCC): ICD-10-CM

## 2021-12-05 RX ORDER — FENOFIBRATE 145 MG/1
TABLET, COATED ORAL
Qty: 30 TABLET | Refills: 5 | Status: SHIPPED | OUTPATIENT
Start: 2021-12-05 | End: 2022-05-14

## 2021-12-05 RX ORDER — LISINOPRIL 30 MG/1
TABLET ORAL
Qty: 30 TABLET | Refills: 3 | Status: SHIPPED | OUTPATIENT
Start: 2021-12-05 | End: 2022-02-23

## 2021-12-05 RX ORDER — HYDROCHLOROTHIAZIDE 12.5 MG/1
CAPSULE, GELATIN COATED ORAL
Qty: 30 CAPSULE | Refills: 3 | Status: SHIPPED | OUTPATIENT
Start: 2021-12-05 | End: 2022-02-23

## 2022-01-09 DIAGNOSIS — I10 ESSENTIAL HYPERTENSION: ICD-10-CM

## 2022-01-09 RX ORDER — AMLODIPINE BESYLATE 10 MG/1
TABLET ORAL
Qty: 30 TABLET | Refills: 3 | Status: SHIPPED | OUTPATIENT
Start: 2022-01-09 | End: 2022-02-23

## 2022-02-16 ENCOUNTER — TELEPHONE (OUTPATIENT)
Dept: INTERNAL MEDICINE CLINIC | Facility: CLINIC | Age: 67
End: 2022-02-16

## 2022-02-16 NOTE — TELEPHONE ENCOUNTER
Patient is requesting a script for an MRI of his neck  He wants to have this done at a [place] [address] facility  He is requesting to be put under to have this done due to his anxiety          Please advise

## 2022-02-17 ENCOUNTER — OFFICE VISIT (OUTPATIENT)
Dept: INTERNAL MEDICINE CLINIC | Facility: CLINIC | Age: 67
End: 2022-02-17
Payer: MEDICARE

## 2022-02-17 ENCOUNTER — APPOINTMENT (OUTPATIENT)
Dept: RADIOLOGY | Age: 67
End: 2022-02-17
Payer: MEDICARE

## 2022-02-17 VITALS
BODY MASS INDEX: 26.42 KG/M2 | WEIGHT: 178.4 LBS | RESPIRATION RATE: 16 BRPM | DIASTOLIC BLOOD PRESSURE: 78 MMHG | SYSTOLIC BLOOD PRESSURE: 138 MMHG | OXYGEN SATURATION: 96 % | HEART RATE: 90 BPM | HEIGHT: 69 IN

## 2022-02-17 DIAGNOSIS — M54.50 CHRONIC LOW BACK PAIN, UNSPECIFIED BACK PAIN LATERALITY, UNSPECIFIED WHETHER SCIATICA PRESENT: ICD-10-CM

## 2022-02-17 DIAGNOSIS — M54.2 NECK PAIN: Primary | ICD-10-CM

## 2022-02-17 DIAGNOSIS — M54.2 NECK PAIN: ICD-10-CM

## 2022-02-17 DIAGNOSIS — G89.29 CHRONIC LOW BACK PAIN, UNSPECIFIED BACK PAIN LATERALITY, UNSPECIFIED WHETHER SCIATICA PRESENT: ICD-10-CM

## 2022-02-17 PROCEDURE — 72050 X-RAY EXAM NECK SPINE 4/5VWS: CPT

## 2022-02-17 PROCEDURE — 99213 OFFICE O/P EST LOW 20 MIN: CPT | Performed by: INTERNAL MEDICINE

## 2022-02-19 PROBLEM — M54.2 NECK PAIN: Status: ACTIVE | Noted: 2022-02-19

## 2022-02-19 PROBLEM — G89.29 CHRONIC LOW BACK PAIN: Status: ACTIVE | Noted: 2022-02-19

## 2022-02-19 PROBLEM — M54.50 CHRONIC LOW BACK PAIN: Status: ACTIVE | Noted: 2022-02-19

## 2022-02-19 NOTE — PROGRESS NOTES
INTERNAL MEDICINE FOLLOW-UP OFFICE VISIT  [facility]    NAME: Chepe Lechuga  AGE: 73 y o  SEX: male  : 1955     DATE: 2022     Assessment and Plan:     Problem List Items Addressed This Visit        Other    Chronic low back pain    Relevant Orders    Ambulatory Referral to Physical Therapy    Neck pain - Primary    Relevant Orders    XR spine cervical complete 4 or 5 vw non injury    Ambulatory Referral to Physical Therapy      · Request MRI results from Dr Nasir Regalado  · Discussed importance of starting physical therapy and if this failed then will refer to pain management for consideration of steroid injection  Return for Recheck and Medicare, Next scheduled follow up  Chief Complaint:     Chief Complaint   Patient presents with    Follow-up     Discuss MRI        History of Present Illness:     73year old male patient is being seen for follow up on chronic neck pain , back pain and numbness of anterior thigh  He denies any recent trauma/fall  No incontinence  No numbness/tinlgling of upper extremities or distal lower extremities  He denies any weakness or gait instability  Patient was seen by chiropractor Dr Nasir Regalado who recommended MRI lumbar and cervical spine  Patient was able to do his lumbar MRI and he bring a picture of it , will request full report from Dr Babs Rios  He is requesting to order MRI C spine for him to further evaluate his chronic neck pain  I discussed with him that there is currently no indication to order this imaging at this time due to the above  Patient states that he will be starting spinal decompression with Dr Babs Rios  We recommend physical therapy      The following portions of the patient's history were reviewed and updated as appropriate: allergies, current medications, past family history, past medical history, past social history, past surgical history and problem list      Review of Systems: Review of Systems   Constitutional: Negative for chills and fever  HENT: Negative for ear pain and sore throat  Eyes: Negative for pain and visual disturbance  Respiratory: Negative for cough and shortness of breath  Cardiovascular: Negative for chest pain and palpitations  Gastrointestinal: Negative for abdominal pain and vomiting  Genitourinary: Negative for dysuria and hematuria  Musculoskeletal: Positive for arthralgias and back pain  Skin: Negative for color change and rash  Neurological: Positive for numbness (on the anterior thigh)  Negative for seizures and syncope  All other systems reviewed and are negative  Problem List:     Patient Active Problem List   Diagnosis    [facility] 1 [facility]    Seasonal allergic rhinitis    Encounter for hepatitis C screening test for low risk patient    Dyslipidemia    Essential hypertension    Diabetes mellitus type 2    Need for 23-polyvalent pneumococcal polysaccharide vaccine    Overweight (BMI 25 0-29  9)    Tinea pedis of left foot    Essential hypertension    Hypertriglyceridemia    Tobacco abuse    COPD exacerbation    Leukocytosis    Diarrhea    Screening for HIV (human immunodeficiency virus)    Wellness examination        Objective:     /78   Pulse 90   Resp 16   Ht 5' 9" (1 753 m)   Wt 80 9 kg (178 lb 6 4 oz)   SpO2 96%   BMI 26 35 kg/m²     Physical Exam  Vitals and nursing note reviewed  Constitutional:       General: He is not in acute distress  Appearance: He is well-developed  He is not ill-appearing  HENT:      Head: Normocephalic and atraumatic  Eyes:      Conjunctiva/sclera: Conjunctivae normal    Cardiovascular:      Rate and Rhythm: Normal rate and regular rhythm  Heart sounds: No murmur heard  Pulmonary:      Effort: Pulmonary effort is normal  No respiratory distress  Breath sounds: Normal breath sounds  Abdominal:      Palpations: Abdomen is soft  Tenderness: There is no abdominal tenderness  Musculoskeletal:         General: No swelling  Cervical back: Normal range of motion and neck supple  No rigidity or tenderness  Right lower leg: No edema  Left lower leg: No edema  Skin:     General: Skin is warm and dry  Neurological:      General: No focal deficit present  Mental Status: He is alert and oriented to person, place, and time  Cranial Nerves: No cranial nerve deficit  Sensory: No sensory deficit  Motor: No weakness  Gait: Gait normal          Pertinent Laboratory/Diagnostic Studies:    Laboratory Results: I have personally reviewed the pertinent laboratory results/reports     CBC:   Results from Last 12 Months   Lab Units 03/22/21  0706   WBC Thousand/uL 12 32*   RBC Million/uL 5 56   HEMOGLOBIN g/dL 16 4   HEMATOCRIT % 49 6*   MCV fL 89   MCH pg 29 5   MCHC g/dL 33 1   RDW % 14 1   MPV fL 9 1   PLATELETS Thousands/uL 351   NRBC AUTO /100 WBCs 0   NEUTROS PCT % 55   LYMPHS PCT % 31   MONOS PCT % 11   EOS PCT % 2   BASOS PCT % 1   NEUTROS ABS Thousands/µL 6 86   LYMPHS ABS Thousands/µL 3 87   MONOS ABS Thousand/µL 1 30*   EOS ABS Thousand/µL 0 19       Radiology/Other Diagnostic Testing Results: I have personally reviewed pertinent reports        Soto Naqvi MD  [facility]

## 2022-02-23 DIAGNOSIS — E13.9 DIABETES 1.5, MANAGED AS TYPE 2 (HCC): ICD-10-CM

## 2022-02-23 DIAGNOSIS — I10 ESSENTIAL HYPERTENSION: ICD-10-CM

## 2022-02-23 RX ORDER — HYDROCHLOROTHIAZIDE 12.5 MG/1
CAPSULE, GELATIN COATED ORAL
Qty: 90 CAPSULE | Refills: 1 | Status: SHIPPED | OUTPATIENT
Start: 2022-02-23

## 2022-02-23 RX ORDER — AMLODIPINE BESYLATE 10 MG/1
TABLET ORAL
Qty: 90 TABLET | Refills: 1 | Status: SHIPPED | OUTPATIENT
Start: 2022-02-23

## 2022-02-23 RX ORDER — LISINOPRIL 30 MG/1
TABLET ORAL
Qty: 90 TABLET | Refills: 1 | Status: SHIPPED | OUTPATIENT
Start: 2022-02-23

## 2022-02-28 ENCOUNTER — EVALUATION (OUTPATIENT)
Dept: PHYSICAL THERAPY | Age: 67
End: 2022-02-28
Payer: MEDICARE

## 2022-02-28 DIAGNOSIS — M54.2 NECK PAIN: ICD-10-CM

## 2022-02-28 DIAGNOSIS — M54.50 CHRONIC LOW BACK PAIN, UNSPECIFIED BACK PAIN LATERALITY, UNSPECIFIED WHETHER SCIATICA PRESENT: ICD-10-CM

## 2022-02-28 DIAGNOSIS — G89.29 CHRONIC LOW BACK PAIN, UNSPECIFIED BACK PAIN LATERALITY, UNSPECIFIED WHETHER SCIATICA PRESENT: ICD-10-CM

## 2022-02-28 PROCEDURE — 97110 THERAPEUTIC EXERCISES: CPT | Performed by: PHYSICAL THERAPIST

## 2022-02-28 PROCEDURE — 97162 PT EVAL MOD COMPLEX 30 MIN: CPT | Performed by: PHYSICAL THERAPIST

## 2022-02-28 NOTE — PROGRESS NOTES
PT Evaluation     Today's date: 2022  Patient name: Rosalva Frost  : 1955  MRN: [id number]  Referring provider: Lizzy Turner MD  Dx:   Encounter Diagnosis     ICD-10-CM    1  Neck pain  M54 2 Ambulatory Referral to Physical Therapy   2  Chronic low back pain, unspecified back pain laterality, unspecified whether sciatica present  M54 50 Ambulatory Referral to Physical Therapy    G89 29                   Assessment  Assessment details: Patient presents complaining of neck and low back pain, which has been ongoing for a few years, per patient  He was being seen at a chiropractor which gave him some relief  He does note his neck has increased crepitus, as well as headaches, he notes his motion is lacking there  He reports the pain is worse with lifting    He locates his pain to his central low back and into his R anterior thigh, as well as his central cervical spine  He had x-rays completed on his cervical spine which demonstrates mild spondylosis     He is currently retired     Patient presents with limitations in cervical and thoracic range of motion, as well as accessory motion throughout his spine  Patient would benefit from skilled physical therapy to address limitations and deficits  Patient provided with HEP  Patient made aware of condition as well as the proposed treatment plan, including risks, benefits and alternatives  Impairments: abnormal or restricted ROM, activity intolerance, impaired physical strength, lacks appropriate home exercise program and pain with function     Prognosis: good    Goals  ST- demonstrate compliancy with HEP in 1 week     Decrease reported pain to 5/10 at worst and with activity, to improve functional capacity, within 3 weeks   Improve cervical range of motion to normal with no complaints of pain, within 3 weeks     LT- Improve FOTO score to specified value to improve patients perceived benefit of therapy, in 8 weeks   Improve B/L LE strength to 4+/5, to improve ability to complete ADL's at home, within 8 weeks   Be able to turkey hunt with no complaints of pain, within 10 weeks     Plan  Plan details: Physical therapy with focus on there ex and manual therapy to improve ability to complete tasks around the house and complete functional activities, use of modalities as needed     Patient would benefit from: skilled physical therapy  Referral necessary: No  Planned modality interventions: cryotherapy, TENS and thermotherapy: hydrocollator packs  Planned therapy interventions: ADL training, balance, balance/weight bearing training, gait training, manual therapy, joint mobilization, neuromuscular re-education, strengthening, stretching, therapeutic activities and therapeutic exercise  Frequency: 2x week  Duration in weeks: 12  Plan of Care beginning date: 2022  Plan of Care expiration date: 2022  Treatment plan discussed with: patient        Subjective Evaluation    History of Present Illness  Mechanism of injury: Chronic onset   Pain  Current pain ratin  At best pain ratin  At worst pain rating: 10  Location: neck, low back, R LE   Quality: dull ache  Relieving factors: ice and heat  Aggravating factors: lifting  Progression: no change    Treatments  Previous treatment: chiropractic  Patient Goals  Patient goals for therapy: decreased pain and independence with ADLs/IADLs  Patient goal: be able to turkey hunt with no pain         Objective     General Comments:      Lumbar Comments  Ttp along central lumbar and cervical spine, with some referred pain, equal B/L     Observed R lateral thoracolumbar shift (shoulders R, hips L)     rom  Cervical flexion and extension normal   B/L side flex and rotation minimally limited 2ndary to tightness  Thoracolumbar motions all normal except R side bend     mmt  Cervical flexion 4+  Cervical extension 4+   Cervical side bend L=4+ R=4-  Shoulder shrug L=4+ R=4  Shoulder flexion L=4 R=4    Hip flexion L=4+ R=4-  Hip abduction L=4 R=4  Hip adduction L=4 R=4  Knee extension L=4+ R=4+  Knee flexion L=4+ R=4+    Special tests  Slump (-)   slr (-)   Crossed slr (-)     Joint play   CPA's hypomobile throughout thoracolumbar spine   Cervical CPA's and side glides also hypomobile              Precautions: COPD, DM       Manuals             Cervical 3-7 CPA's              Cervical side glides 3-7             Cervical ROM             Thoracolumbar CPA's T8-L4             Neuro Re-Ed             Bridges              SLR abd/ext              rows             LPD             Multifidi press             Thoracolumbar mini rotations                          Ther Ex             Bike              Self snags cervical rotation B/L             Lateral shift correction (hips to R)                                                                               Ther Activity                                       Gait Training                                       Modalities

## 2022-03-02 ENCOUNTER — OFFICE VISIT (OUTPATIENT)
Dept: PHYSICAL THERAPY | Age: 67
End: 2022-03-02
Payer: MEDICARE

## 2022-03-02 DIAGNOSIS — M54.50 CHRONIC LOW BACK PAIN, UNSPECIFIED BACK PAIN LATERALITY, UNSPECIFIED WHETHER SCIATICA PRESENT: ICD-10-CM

## 2022-03-02 DIAGNOSIS — G89.29 CHRONIC LOW BACK PAIN, UNSPECIFIED BACK PAIN LATERALITY, UNSPECIFIED WHETHER SCIATICA PRESENT: ICD-10-CM

## 2022-03-02 DIAGNOSIS — M54.2 NECK PAIN: Primary | ICD-10-CM

## 2022-03-02 PROCEDURE — 97140 MANUAL THERAPY 1/> REGIONS: CPT | Performed by: PHYSICAL THERAPIST

## 2022-03-02 PROCEDURE — 97110 THERAPEUTIC EXERCISES: CPT | Performed by: PHYSICAL THERAPIST

## 2022-03-02 PROCEDURE — 97112 NEUROMUSCULAR REEDUCATION: CPT | Performed by: PHYSICAL THERAPIST

## 2022-03-02 NOTE — PROGRESS NOTES
Daily Note     Today's date: 3/2/2022  Patient name: Denise Gracia  : 1955  MRN: [id number]  Referring provider: Nadira Witt MD  Dx:   Encounter Diagnosis     ICD-10-CM    1  Neck pain  M54 2    2  Chronic low back pain, unspecified back pain laterality, unspecified whether sciatica present  M54 50     G89 29                   Subjective: Reports feeling some pain down his R LE coming in today  Objective: See treatment diary below      Assessment: Tolerated treatment well  Patient would benefit from continued PT, did well with first tx and was able to demonstrate HEP with no cueing needed  Plan: Continue per plan of care        Precautions: COPD, DM       Manuals 3/2             Cervical 3-7 CPA's  CW            Cervical side glides 3-7 CW            Cervical ROM CW             Thoracolumbar CPA's T8-L4 CW             Neuro Re-Ed             Bridges  20x5"             SLR 3way 2x10 ea            rows 2x15 GTB            LPD 2x15 GTB            Multifidi press             Thoracolumbar mini rotations                          Ther Ex             Bike  8'             Self snags cervical rotation B/L 10x10" ea             Lateral side glides (hips to R)  30x                                                                             Ther Activity                                       Gait Training                                       Modalities

## 2022-03-07 ENCOUNTER — OFFICE VISIT (OUTPATIENT)
Dept: PHYSICAL THERAPY | Age: 67
End: 2022-03-07
Payer: MEDICARE

## 2022-03-07 DIAGNOSIS — M54.50 CHRONIC LOW BACK PAIN, UNSPECIFIED BACK PAIN LATERALITY, UNSPECIFIED WHETHER SCIATICA PRESENT: ICD-10-CM

## 2022-03-07 DIAGNOSIS — M54.2 NECK PAIN: Primary | ICD-10-CM

## 2022-03-07 DIAGNOSIS — G89.29 CHRONIC LOW BACK PAIN, UNSPECIFIED BACK PAIN LATERALITY, UNSPECIFIED WHETHER SCIATICA PRESENT: ICD-10-CM

## 2022-03-07 PROCEDURE — 97140 MANUAL THERAPY 1/> REGIONS: CPT

## 2022-03-07 PROCEDURE — 97112 NEUROMUSCULAR REEDUCATION: CPT

## 2022-03-07 PROCEDURE — 97110 THERAPEUTIC EXERCISES: CPT

## 2022-03-07 NOTE — PROGRESS NOTES
Daily Note     Today's date: 3/7/2022  Patient name: Donny Medina  : 1955  MRN: [id number]  Referring provider: Hudson Arreaga MD  Dx:   Encounter Diagnosis     ICD-10-CM    1  Neck pain  M54 2    2  Chronic low back pain, unspecified back pain laterality, unspecified whether sciatica present  M54 50     G89 29                   Subjective:  Pt reports he was feeling better since beginning therapy but sore after climbing hill and stocking fish      Objective: See treatment diary below      Assessment: ex progressions as per PT POC, pt gracia well with min fatigue       Plan: Continue per plan of care  Progress treatment as tolerated         Precautions: COPD, DM       Manuals 3/2  3/7/22           Cervical 3-7 CPA's  CW            Cervical side glides 3-7 CW            Cervical ROM CW  VK           Thoracolumbar CPA's T8-L4 CW             Neuro Re-Ed  3/7/22           Bridges  20x5"  2x10x5"           SLR 3way 2x10 ea 2x10 ea           rows 2x15 GTB btb 2x15           LPD 2x15 GTB gtb 2x15           Multifidi press  gtb 20x ea           Thoracolumbar mini rotations  nv                        Ther Ex  3/7/22           Bike  8'  10'           Self snags cervical rotation B/L 10x10" ea  10x10" ea           Lateral side glides (hips to R)  30x 30x                                                                            Ther Activity                                       Gait Training                                       Modalities

## 2022-03-08 ENCOUNTER — APPOINTMENT (OUTPATIENT)
Dept: LAB | Age: 67
End: 2022-03-08
Payer: MEDICARE

## 2022-03-08 DIAGNOSIS — Z11.4 SCREENING FOR HUMAN IMMUNODEFICIENCY VIRUS: ICD-10-CM

## 2022-03-08 DIAGNOSIS — E11.9 TYPE 2 DIABETES MELLITUS WITHOUT COMPLICATION, WITHOUT LONG-TERM CURRENT USE OF INSULIN (HCC): ICD-10-CM

## 2022-03-08 DIAGNOSIS — Z12.5 SCREENING PSA (PROSTATE SPECIFIC ANTIGEN): ICD-10-CM

## 2022-03-08 LAB
ALBUMIN SERPL BCP-MCNC: 4 G/DL (ref 3.5–5)
ALP SERPL-CCNC: 84 U/L (ref 46–116)
ALT SERPL W P-5'-P-CCNC: 30 U/L (ref 12–78)
ANION GAP SERPL CALCULATED.3IONS-SCNC: 6 MMOL/L (ref 4–13)
AST SERPL W P-5'-P-CCNC: 12 U/L (ref 5–45)
BILIRUB SERPL-MCNC: 0.49 MG/DL (ref 0.2–1)
BUN SERPL-MCNC: 16 MG/DL (ref 5–25)
CALCIUM SERPL-MCNC: 9.6 MG/DL (ref 8.3–10.1)
CHLORIDE SERPL-SCNC: 109 MMOL/L (ref 100–108)
CHOLEST SERPL-MCNC: 204 MG/DL
CO2 SERPL-SCNC: 23 MMOL/L (ref 21–32)
CREAT SERPL-MCNC: 0.84 MG/DL (ref 0.6–1.3)
CREAT UR-MCNC: 121 MG/DL
EST. AVERAGE GLUCOSE BLD GHB EST-MCNC: 148 MG/DL
GFR SERPL CREATININE-BSD FRML MDRD: 91 ML/MIN/1.73SQ M
GLUCOSE P FAST SERPL-MCNC: 109 MG/DL (ref 65–99)
HBA1C MFR BLD: 6.8 %
HDLC SERPL-MCNC: 38 MG/DL
HIV 1+2 AB+HIV1 P24 AG SERPL QL IA: NORMAL
LDLC SERPL CALC-MCNC: 117 MG/DL (ref 0–100)
MICROALBUMIN UR-MCNC: 25.3 MG/L (ref 0–20)
MICROALBUMIN/CREAT 24H UR: 21 MG/G CREATININE (ref 0–30)
POTASSIUM SERPL-SCNC: 3.9 MMOL/L (ref 3.5–5.3)
PROT SERPL-MCNC: 7.9 G/DL (ref 6.4–8.2)
SODIUM SERPL-SCNC: 138 MMOL/L (ref 136–145)
TRIGL SERPL-MCNC: 244 MG/DL

## 2022-03-08 PROCEDURE — 82043 UR ALBUMIN QUANTITATIVE: CPT

## 2022-03-08 PROCEDURE — 80053 COMPREHEN METABOLIC PANEL: CPT

## 2022-03-08 PROCEDURE — 87389 HIV-1 AG W/HIV-1&-2 AB AG IA: CPT

## 2022-03-08 PROCEDURE — 83036 HEMOGLOBIN GLYCOSYLATED A1C: CPT

## 2022-03-08 PROCEDURE — 36415 COLL VENOUS BLD VENIPUNCTURE: CPT

## 2022-03-08 PROCEDURE — 82570 ASSAY OF URINE CREATININE: CPT

## 2022-03-08 PROCEDURE — 80061 LIPID PANEL: CPT

## 2022-03-11 ENCOUNTER — OFFICE VISIT (OUTPATIENT)
Dept: PHYSICAL THERAPY | Age: 67
End: 2022-03-11
Payer: MEDICARE

## 2022-03-11 DIAGNOSIS — M54.50 CHRONIC LOW BACK PAIN, UNSPECIFIED BACK PAIN LATERALITY, UNSPECIFIED WHETHER SCIATICA PRESENT: ICD-10-CM

## 2022-03-11 DIAGNOSIS — G89.29 CHRONIC LOW BACK PAIN, UNSPECIFIED BACK PAIN LATERALITY, UNSPECIFIED WHETHER SCIATICA PRESENT: ICD-10-CM

## 2022-03-11 DIAGNOSIS — M54.2 NECK PAIN: Primary | ICD-10-CM

## 2022-03-11 PROCEDURE — 97110 THERAPEUTIC EXERCISES: CPT

## 2022-03-11 PROCEDURE — 97140 MANUAL THERAPY 1/> REGIONS: CPT

## 2022-03-11 PROCEDURE — 97112 NEUROMUSCULAR REEDUCATION: CPT

## 2022-03-11 NOTE — PROGRESS NOTES
Daily Note     Today's date: 3/11/2022  Patient name: Nusrat Pedroza  : 1955  MRN: [id number]  Referring provider: Vijay Pickens MD  Dx:   Encounter Diagnosis     ICD-10-CM    1  Neck pain  M54 2    2  Chronic low back pain, unspecified back pain laterality, unspecified whether sciatica present  M54 50     G89 29                   Subjective: pt reports L side neck feels tight today      Objective: See treatment diary below      Assessment: ex progressions challenging but gracia well with mod fatigue/min discomfort noted      Plan: Continue per plan of care  Progress treatment as tolerated         Precautions: COPD, DM       Manuals 3/2  3/7/22 3/11/22          Cervical 3-7 CPA's  CW            Cervical side glides 3-7 CW            Cervical ROM CW  VK VK          Thoracolumbar CPA's T8-L4 CW             Neuro Re-Ed  3/7/22 3/11/22          Bridges  20x5"  2x10x5" 2x15x5"          SLR 3way 2x10 ea 2x10 ea 2x10 ea BL          rows 2x15 GTB btb 2x15 btb 2x15          LPD 2x15 GTB gtb 2x15 gtb 2x15          Multifidi press  gtb 20x ea gtb 20x ea          Thoracolumbar mini rotations  nv nv                       Ther Ex  3/7/22 3/11/22          Bike  8'  10' 10'          Self snags cervical rotation B/L 10x10" ea  10x10" ea 10x10"          Lateral side glides (hips to R)  30x 30x 3x10                                                                           Ther Activity                                       Gait Training                                       Modalities

## 2022-03-14 ENCOUNTER — APPOINTMENT (OUTPATIENT)
Dept: PHYSICAL THERAPY | Age: 67
End: 2022-03-14
Payer: MEDICARE

## 2022-03-14 ENCOUNTER — OFFICE VISIT (OUTPATIENT)
Dept: INTERNAL MEDICINE CLINIC | Facility: CLINIC | Age: 67
End: 2022-03-14
Payer: MEDICARE

## 2022-03-14 ENCOUNTER — TELEPHONE (OUTPATIENT)
Dept: INTERNAL MEDICINE CLINIC | Facility: CLINIC | Age: 67
End: 2022-03-14

## 2022-03-14 VITALS
RESPIRATION RATE: 16 BRPM | HEIGHT: 69 IN | WEIGHT: 188.6 LBS | SYSTOLIC BLOOD PRESSURE: 132 MMHG | BODY MASS INDEX: 27.93 KG/M2 | HEART RATE: 86 BPM | OXYGEN SATURATION: 99 % | DIASTOLIC BLOOD PRESSURE: 72 MMHG

## 2022-03-14 DIAGNOSIS — Z12.12 SCREENING FOR COLORECTAL CANCER: Primary | ICD-10-CM

## 2022-03-14 DIAGNOSIS — E66.3 OVERWEIGHT (BMI 25.0-29.9): ICD-10-CM

## 2022-03-14 DIAGNOSIS — Z12.11 SCREENING FOR COLORECTAL CANCER: Primary | ICD-10-CM

## 2022-03-14 DIAGNOSIS — Z00.00 MEDICARE ANNUAL WELLNESS VISIT, SUBSEQUENT: ICD-10-CM

## 2022-03-14 DIAGNOSIS — Z72.0 TOBACCO ABUSE: ICD-10-CM

## 2022-03-14 DIAGNOSIS — Z12.5 SCREENING PSA (PROSTATE SPECIFIC ANTIGEN): ICD-10-CM

## 2022-03-14 DIAGNOSIS — I10 PRIMARY HYPERTENSION: ICD-10-CM

## 2022-03-14 DIAGNOSIS — Z12.31 ENCOUNTER FOR SCREENING MAMMOGRAM FOR BREAST CANCER: ICD-10-CM

## 2022-03-14 DIAGNOSIS — E11.9 TYPE 2 DIABETES MELLITUS WITHOUT COMPLICATION, WITHOUT LONG-TERM CURRENT USE OF INSULIN (HCC): ICD-10-CM

## 2022-03-14 DIAGNOSIS — E78.1 HYPERTRIGLYCERIDEMIA: ICD-10-CM

## 2022-03-14 PROCEDURE — 1123F ACP DISCUSS/DSCN MKR DOCD: CPT | Performed by: INTERNAL MEDICINE

## 2022-03-14 PROCEDURE — 99213 OFFICE O/P EST LOW 20 MIN: CPT | Performed by: INTERNAL MEDICINE

## 2022-03-14 PROCEDURE — G0402 INITIAL PREVENTIVE EXAM: HCPCS | Performed by: INTERNAL MEDICINE

## 2022-03-14 NOTE — ASSESSMENT & PLAN NOTE
Lab Results   Component Value Date    HGBA1C 6 8 (H) 03/08/2022   I have counselled the pt to follow a healthy and balanced diet ,and recommend routine exercise  I will be ordering diabetic laboratories including comprehensive metabolic panel, hemoglobin A1c, urine microalbumin, lipid panel    Goes for routine eye examination

## 2022-03-14 NOTE — ASSESSMENT & PLAN NOTE
Assessment and plan 1  Medicare subsequent annual wellness examination overall the patient is clinically stable and doing well, we encouraged the patient to follow a healthy and balanced diet  We recommend that the patient exercise routinely approximately 30 minutes 5 times per week   We have reviewed the patient's vaccines and have made recommendations for updates if necessary consider the shingles vaccine will order screening PSA        We will be ordering screening laboratories which are age appropriate  Return to the office in      call if any problems

## 2022-03-14 NOTE — PROGRESS NOTES
Assessment and Plan:     Problem List Items Addressed This Visit        Endocrine    Diabetes mellitus type 2       Lab Results   Component Value Date    HGBA1C 6 8 (H) 03/08/2022   I have counselled the pt to follow a healthy and balanced diet ,and recommend routine exercise  I will be ordering diabetic laboratories including comprehensive metabolic panel, hemoglobin A1c, urine microalbumin, lipid panel  Goes for routine eye examination            Other    Hypertriglyceridemia    Relevant Orders    Comprehensive metabolic panel    Lipid Panel with Direct LDL reflex    Medicare annual wellness visit, subsequent     Assessment and plan 1  Medicare subsequent annual wellness examination overall the patient is clinically stable and doing well, we encouraged the patient to follow a healthy and balanced diet  We recommend that the patient exercise routinely approximately 30 minutes 5 times per week   We have reviewed the patient's vaccines and have made recommendations for updates if necessary consider the shingles vaccine will order screening PSA        We will be ordering screening laboratories which are age appropriate  Return to the office in      call if any problems  Other Visit Diagnoses     Screening for colorectal cancer    -  Primary    Encounter for screening mammogram for breast cancer        Screening PSA (prostate specific antigen)        Relevant Orders    PSA, Total Screen           Preventive health issues were discussed with patient, and age appropriate screening tests were ordered as noted in patient's After Visit Summary  Personalized health advice and appropriate referrals for health education or preventive services given if needed, as noted in patient's After Visit Summary       History of Present Illness:     Patient presents for Medicare Annual Wellness visit    Patient Care Team:  Jessa Chan DO as PCP - Lucien Robles DO as PCP - PCP-[facility] Attributed-Roster     Problem List:     Patient Active Problem List   Diagnosis    Diabetes 1 5, managed as type 2 ([place] [address] )    Seasonal allergic rhinitis    Encounter for hepatitis C screening test for low risk patient    Dyslipidemia    Essential hypertension    Diabetes mellitus type 2    Need for 23-polyvalent pneumococcal polysaccharide vaccine    Overweight (BMI 25 0-29  9)    Tinea pedis of left foot    Essential hypertension    Hypertriglyceridemia    Tobacco abuse    COPD exacerbation    Leukocytosis    Diarrhea    Screening for HIV (human immunodeficiency virus)    Wellness examination    Chronic low back pain    Neck pain    Medicare annual wellness visit, subsequent      Past Medical and Surgical History:     Past Medical History:   Diagnosis Date    Adenocarcinoma of prostate ([place] [address] )     [id number]  LAST ASSESSED    Diabetes mellitus ([place] [address] )     Hypertension      Past Surgical History:   Procedure Laterality Date    COLON SURGERY      HERNIA REPAIR      SHOULDER SURGERY      TONSILLECTOMY      TONSILLECTOMY AND ADENOIDECTOMY        Family History:     Family History   Problem Relation Age of Onset    Diabetes Father         MELLITUS      Social History:     Social History     Socioeconomic History    Marital status: /Civil Union     Spouse name: None    Number of children: None    Years of education: None    Highest education level: None   Occupational History    None   Tobacco Use    Smoking status: Current Every Day Smoker     Packs/day: 1 00     Types: Cigarettes    Smokeless tobacco: Never Used   Vaping Use    Vaping Use: Never used   Substance and Sexual Activity    Alcohol use: Not Currently     Comment: SOCIAL    Drug use: No    Sexual activity: None   Other Topics Concern    None   Social History Narrative    COPY OF ADVANCE DIRECTIVE OBTAINED     Social Determinants of Health     Financial Resource Strain: Not on file   Food Insecurity: Not on file Transportation Needs: Not on file   Physical Activity: Not on file   Stress: Not on file   Social Connections: Not on file   Intimate Partner Violence: Not on file   Housing Stability: Not on file      Medications and Allergies:     Current Outpatient Medications   Medication Sig Dispense Refill    albuterol (PROVENTIL HFA,VENTOLIN HFA) 90 mcg/act inhaler Inhale 2 puffs every 6 (six) hours as needed for wheezing 1 Inhaler 1    amLODIPine (NORVASC) 10 mg tablet TAKE 1 TABLET BY MOUTH EVERY DAY 90 tablet 1    budesonide (RHINOCORT ALLERGY) 32 MCG/ACT nasal spray 1 spray into each nostril daily      budesonide (RINOCORT AQUA) 32 MCG/ACT nasal spray 1 spray into each nostril daily 1 Bottle 4    Cholecalciferol (VITAMIN D3) 1000 units CAPS Take 1 capsule by mouth daily      cyanocobalamin (VITAMIN B-12) 1,000 mcg tablet Take by mouth daily      fenofibrate (TRICOR) 145 mg tablet TAKE 1 TABLET BY MOUTH EVERY DAY 30 tablet 5    hydrochlorothiazide (MICROZIDE) 12 5 mg capsule TAKE 1 CAPSULE BY MOUTH EVERY DAY 90 capsule 1    ibuprofen (MOTRIN) 800 mg tablet Take 800 mg by mouth every 8 (eight) hours as needed  0    lisinopril (ZESTRIL) 30 mg tablet TAKE 1 TABLET BY MOUTH EVERY DAY 90 tablet 1    metFORMIN (GLUCOPHAGE) 500 mg tablet TAKE 1 TABLET BY MOUTH TWICE A DAY WITH MEALS 180 tablet 1    Multiple Vitamin (MULTIVITAMIN) tablet Take 1 tablet by mouth daily      Omega-3 Fatty Acids (FISH OIL) 1,000 mg Take 3 capsules by mouth daily      VITAMIN B COMPLEX-C CAPS Take 1 capsule by mouth daily       No current facility-administered medications for this visit  Allergies   Allergen Reactions    Penicillins      Other reaction(s): Other (See Comments)  Unknown  Other reaction(s): Other (See Comments)  Unknown  Other reaction(s): Unknown  Category:  Allergy;       Immunizations:     Immunization History   Administered Date(s) Administered    H1N1, All Formulations 01/21/2010    INFLUENZA 02/11/2015, 11/04/2018, 10/13/2019, 11/08/2020, 11/07/2021    Influenza, seasonal, injectable 10/19/2011, 11/07/2013, 09/12/2014    Pneumococcal Polysaccharide PPV23 12/14/2018    Td (adult), adsorbed 11/25/2013    Tdap 09/07/2021      Health Maintenance:         Topic Date Due    Colorectal Cancer Screening  01/16/2015    HIV Screening  Completed    Hepatitis C Screening  Completed         Topic Date Due    COVID-19 Vaccine (1) Never done      Medicare Health Risk Assessment:     /72   Pulse 86   Resp 16   Ht 5' 9" (1 753 m)   Wt 85 5 kg (188 lb 9 6 oz)   SpO2 99%   BMI 27 85 kg/m²      Chepe is here for his Subsequent Wellness visit  Health Risk Assessment:   Patient rates overall health as good  Patient feels that their physical health rating is same  Patient is very satisfied with their life  Eyesight was rated as slightly worse  Hearing was rated as same  Patient feels that their emotional and mental health rating is same  Patients states they are never, rarely angry  Patient states they are never, rarely unusually tired/fatigued  Pain experienced in the last 7 days has been some  Patient's pain rating has been 4/10  Patient states that he has experienced no weight loss or gain in last 6 months  Depression Screening:   PHQ-2 Score: 1      Fall Risk Screening: In the past year, patient has experienced: no history of falling in past year      Home Safety:  Patient does not have trouble with stairs inside or outside of their home  Patient has working smoke alarms and has working carbon monoxide detector  Home safety hazards include: none  Nutrition:   Current diet is Regular  Medications:   Patient is currently taking over-the-counter supplements  OTC medications include: see medication list  Patient is able to manage medications       Activities of Daily Living (ADLs)/Instrumental Activities of Daily Living (IADLs):   Walk and transfer into and out of bed and chair?: Yes  Dress and groom yourself?: Yes    Bathe or shower yourself?: Yes    Feed yourself? Yes  Do your laundry/housekeeping?: Yes  Manage your money, pay your bills and track your expenses?: Yes  Make your own meals?: Yes    Do your own shopping?: Yes    Previous Hospitalizations:   Any hospitalizations or ED visits within the last 12 months?: No      Advance Care Planning:   Living will: No    Durable POA for healthcare: No    Advanced directive: No      Cognitive Screening:   Provider or family/friend/caregiver concerned regarding cognition?: No    PREVENTIVE SCREENINGS      Cardiovascular Screening:    General: Screening Not Indicated and History Lipid Disorder      Diabetes Screening:     General: Screening Not Indicated and History Diabetes      Colorectal Cancer Screening:     General: Screening Current      Prostate Cancer Screening:    General: History Prostate Cancer      Abdominal Aortic Aneurysm (AAA) Screening:    Risk factors include: age between 73-69 yo and tobacco use        Lung Cancer Screening:     General: Screening Not Indicated      Hepatitis C Screening:    General: Screening Current    Screening, Brief Intervention, and Referral to Treatment (SBIRT)    Screening  Typical number of drinks in a day: 0  Typical number of drinks in a week: 0  Interpretation: Low risk drinking behavior      Single Item Drug Screening:  How often have you used an illegal drug (including marijuana) or a prescription medication for non-medical reasons in the past year? never    Single Item Drug Screen Score: 0  Interpretation: Negative screen for possible drug use disorder      Devora Francis DO

## 2022-03-14 NOTE — PATIENT INSTRUCTIONS
Medicare Preventive Visit Patient Instructions  Thank you for completing your Welcome to Medicare Visit or Medicare Annual Wellness Visit today  Your next wellness visit will be due in one year (3/15/2023)  The screening/preventive services that you may require over the next 5-10 years are detailed below  Some tests may not apply to you based off risk factors and/or age  Screening tests ordered at today's visit but not completed yet may show as past due  Also, please note that scanned in results may not display below  Preventive Screenings:  Service Recommendations Previous Testing/Comments   Colorectal Cancer Screening  · Colonoscopy    · Fecal Occult Blood Test (FOBT)/Fecal Immunochemical Test (FIT)  · Fecal DNA/Cologuard Test  · Flexible Sigmoidoscopy Age: 54-65 years old   Colonoscopy: every 10 years (May be performed more frequently if at higher risk)  OR  FOBT/FIT: every 1 year  OR  Cologuard: every 3 years  OR  Sigmoidoscopy: every 5 years  Screening may be recommended earlier than age 48 if at higher risk for colorectal cancer  Also, an individualized decision between you and your healthcare provider will decide whether screening between the ages of 74-80 would be appropriate  Colonoscopy: 01/16/2014  FOBT/FIT: Not on file  Cologuard: Not on file  Sigmoidoscopy: Not on file          Prostate Cancer Screening Individualized decision between patient and health care provider in men between ages of 53-78   Medicare will cover every 12 months beginning on the day after your 50th birthday PSA: 3 4 ng/mL           Hepatitis C Screening Once for adults born between 1945 and 1965  More frequently in patients at high risk for Hepatitis C Hep C Antibody: 01/10/2021        Diabetes Screening 1-2 times per year if you're at risk for diabetes or have pre-diabetes Fasting glucose: 109 mg/dL   A1C: 6 8 %        Cholesterol Screening Once every 5 years if you don't have a lipid disorder   May order more often based on risk factors  Lipid panel: 03/08/2022           Other Preventive Screenings Covered by Medicare:  1  Abdominal Aortic Aneurysm (AAA) Screening: covered once if your at risk  You're considered to be at risk if you have a family history of AAA or a male between the age of 73-68 who smoking at least 100 cigarettes in your lifetime  2  Lung Cancer Screening: covers low dose CT scan once per year if you meet all of the following conditions: (1) Age 50-69; (2) No signs or symptoms of lung cancer; (3) Current smoker or have quit smoking within the last 15 years; (4) You have a tobacco smoking history of at least 30 pack years (packs per day x number of years you smoked); (5) You get a written order from a healthcare provider  3  Glaucoma Screening: covered annually if you're considered high risk: (1) You have diabetes OR (2) Family history of glaucoma OR (3)  aged 48 and older OR (3)  American aged 72 and older  3  Osteoporosis Screening: covered every 2 years if you meet one of the following conditions: (1) Have a vertebral abnormality; (2) On glucocorticoid therapy for more than 3 months; (3) Have primary hyperparathyroidism; (4) On osteoporosis medications and need to assess response to drug therapy  5  HIV Screening: covered annually if you're between the age of 12-76  Also covered annually if you are younger than 13 and older than 72 with risk factors for HIV infection  For pregnant patients, it is covered up to 3 times per pregnancy      Immunizations:  Immunization Recommendations   Influenza Vaccine Annual influenza vaccination during flu season is recommended for all persons aged >= 6 months who do not have contraindications   Pneumococcal Vaccine (Prevnar and Pneumovax)  * Prevnar = PCV13  * Pneumovax = PPSV23 Adults 25-60 years old: 1-3 doses may be recommended based on certain risk factors  Adults 72 years old: Prevnar (PCV13) vaccine recommended followed by Pneumovax (PPSV23) vaccine  If already received PPSV23 since turning 65, then PCV13 recommended at least one year after PPSV23 dose  Hepatitis B Vaccine 3 dose series if at intermediate or high risk (ex: diabetes, end stage renal disease, liver disease)   Tetanus (Td) Vaccine - COST NOT COVERED BY MEDICARE PART B Following completion of primary series, a booster dose should be given every 10 years to maintain immunity against tetanus  Td may also be given as tetanus wound prophylaxis  Tdap Vaccine - COST NOT COVERED BY MEDICARE PART B Recommended at least once for all adults  For pregnant patients, recommended with each pregnancy  Shingles Vaccine (Shingrix) - COST NOT COVERED BY MEDICARE PART B  2 shot series recommended in those aged 48 and above     Health Maintenance Due:      Topic Date Due    Colorectal Cancer Screening  01/16/2015    HIV Screening  Completed    Hepatitis C Screening  Completed     Immunizations Due:      Topic Date Due    COVID-19 Vaccine (1) Never done     Advance Directives   What are advance directives? Advance directives are legal documents that state your wishes and plans for medical care  These plans are made ahead of time in case you lose your ability to make decisions for yourself  Advance directives can apply to any medical decision, such as the treatments you want, and if you want to donate organs  What are the types of advance directives? There are many types of advance directives, and each state has rules about how to use them  You may choose a combination of any of the following:  · Living will: This is a written record of the treatment you want  You can also choose which treatments you do not want, which to limit, and which to stop at a certain time  This includes surgery, medicine, IV fluid, and tube feedings  · Durable power of  for healthcare [facility]):   This is a written record that states who you want to make healthcare choices for you when you are unable to make them for yourself  This person, called a proxy, is usually a family member or a friend  You may choose more than 1 proxy  · Do not resuscitate (DNR) order:  A DNR order is used in case your heart stops beating or you stop breathing  It is a request not to have certain forms of treatment, such as CPR  A DNR order may be included in other types of advance directives  · Medical directive: This covers the care that you want if you are in a coma, near death, or unable to make decisions for yourself  You can list the treatments you want for each condition  Treatment may include pain medicine, surgery, blood transfusions, dialysis, IV or tube feedings, and a ventilator (breathing machine)  · Values history: This document has questions about your views, beliefs, and how you feel and think about life  This information can help others choose the care that you would choose  Why are advance directives important? An advance directive helps you control your care  Although spoken wishes may be used, it is better to have your wishes written down  Spoken wishes can be misunderstood, or not followed  Treatments may be given even if you do not want them  An advance directive may make it easier for your family to make difficult choices about your care  Cigarette Smoking and Your Health   Risks to your health if you smoke:  Nicotine and other chemicals found in tobacco damage every cell in your body  Even if you are a light smoker, you have an increased risk for cancer, heart disease, and lung disease  If you are pregnant or have diabetes, smoking increases your risk for complications  Benefits to your health if you stop smoking:   · You decrease respiratory symptoms such as coughing, wheezing, and shortness of breath  · You reduce your risk for cancers of the lung, mouth, throat, kidney, bladder, pancreas, stomach, and cervix  If you already have cancer, you increase the benefits of chemotherapy   You also reduce your risk for cancer returning or a second cancer from developing  · You reduce your risk for heart disease, blood clots, heart attack, and stroke  · You reduce your risk for lung infections, and diseases such as pneumonia, asthma, chronic bronchitis, and emphysema  · Your circulation improves  More oxygen can be delivered to your body  If you have diabetes, you lower your risk for complications, such as kidney, artery, and eye diseases  You also lower your risk for nerve damage  Nerve damage can lead to amputations, poor vision, and blindness  · You improve your body's ability to heal and to fight infections  For more information and support to stop smoking:   · [employer]  Phone: 5- 901 - 587-6477  Web Address: "[employer]  Weight Management   Why it is important to manage your weight:  Being overweight increases your risk of health conditions such as heart disease, high blood pressure, type 2 diabetes, and certain types of cancer  It can also increase your risk for osteoarthritis, sleep apnea, and other respiratory problems  Aim for a slow, steady weight loss  Even a small amount of weight loss can lower your risk of health problems  How to lose weight safely:  A safe and healthy way to lose weight is to eat fewer calories and get regular exercise  You can lose up about 1 pound a week by decreasing the number of calories you eat by 500 calories each day  Healthy meal plan for weight management:  A healthy meal plan includes a variety of foods, contains fewer calories, and helps you stay healthy  A healthy meal plan includes the following:  · Eat whole-grain foods more often  A healthy meal plan should contain fiber  Fiber is the part of grains, fruits, and vegetables that is not broken down by your body  Whole-grain foods are healthy and provide extra fiber in your diet  Some examples of whole-grain foods are whole-wheat breads and pastas, oatmeal, brown rice, and bulgur    · Eat a variety of vegetables every day  Include dark, leafy greens such as spinach, kale, kumar greens, and mustard greens  Eat yellow and orange vegetables such as carrots, sweet potatoes, and winter squash  · Eat a variety of fruits every day  Choose fresh or canned fruit (canned in its own juice or light syrup) instead of juice  Fruit juice has very little or no fiber  · Eat low-fat dairy foods  Drink fat-free (skim) milk or 1% milk  Eat fat-free yogurt and low-fat cottage cheese  Try low-fat cheeses such as mozzarella and other reduced-fat cheeses  · Choose meat and other protein foods that are low in fat  Choose beans or other legumes such as split peas or lentils  Choose fish, skinless poultry (chicken or turkey), or lean cuts of red meat (beef or pork)  Before you cook meat or poultry, cut off any visible fat  · Use less fat and oil  Try baking foods instead of frying them  Add less fat, such as margarine, sour cream, regular salad dressing and mayonnaise to foods  Eat fewer high-fat foods  Some examples of high-fat foods include french fries, doughnuts, ice cream, and cakes  · Eat fewer sweets  Limit foods and drinks that are high in sugar  This includes candy, cookies, regular soda, and sweetened drinks  Exercise:  Exercise at least 30 minutes per day on most days of the week  Some examples of exercise include walking, biking, dancing, and swimming  You can also fit in more physical activity by taking the stairs instead of the elevator or parking farther away from stores  Ask your healthcare provider about the best exercise plan for you  © Copyright [employer] 2018 Information is for End User's use only and may not be sold, redistributed or otherwise used for commercial purposes   All illustrations and images included in CareNotes® are the copyrighted property of [employer]  or [address] [place] [address] [facility]

## 2022-03-14 NOTE — PROGRESS NOTES
Assessment/Plan:    Diabetes mellitus type 2    Lab Results   Component Value Date    HGBA1C 6 8 (H) 03/08/2022   I have counselled the pt to follow a healthy and balanced diet ,and recommend routine exercise  I will be ordering diabetic laboratories including comprehensive metabolic panel, hemoglobin A1c, urine microalbumin, lipid panel  Goes for routine eye examination    Medicare annual wellness visit, subsequent  Assessment and plan 1  Medicare subsequent annual wellness examination overall the patient is clinically stable and doing well, we encouraged the patient to follow a healthy and balanced diet  We recommend that the patient exercise routinely approximately 30 minutes 5 times per week   We have reviewed the patient's vaccines and have made recommendations for updates if necessary consider the shingles vaccine will order screening PSA        We will be ordering screening laboratories which are age appropriate  Return to the office in      call if any problems  Essential hypertension  Hypertension - controlled, I have counseled patient following healthy balance diet, I would like the patient reduce sodium, exercise routinely, I would like the patient continued the med current medical regiment and we will continue to monitor  Overweight (BMI 25 0-29  9)  I have counselled the pt to follow a healthy and balanced diet ,and recommend routine exercise  Tobacco abuse  I have counseled the patient to quit smoking, I have recommended that the patient set up a quit date and I have asked the patient to cut down the cigarettes until the quit date  Problem List Items Addressed This Visit        Endocrine    Diabetes mellitus type 2       Lab Results   Component Value Date    HGBA1C 6 8 (H) 03/08/2022   I have counselled the pt to follow a healthy and balanced diet ,and recommend routine exercise    I will be ordering diabetic laboratories including comprehensive metabolic panel, hemoglobin A1c, urine microalbumin, lipid panel  Goes for routine eye examination            Cardiovascular and Mediastinum    Essential hypertension     Hypertension - controlled, I have counseled patient following healthy balance diet, I would like the patient reduce sodium, exercise routinely, I would like the patient continued the med current medical regiment and we will continue to monitor  Other    Overweight (BMI 25 0-29  9)     I have counselled the pt to follow a healthy and balanced diet ,and recommend routine exercise  Hypertriglyceridemia    Relevant Orders    Comprehensive metabolic panel    Lipid Panel with Direct LDL reflex    Tobacco abuse     I have counseled the patient to quit smoking, I have recommended that the patient set up a quit date and I have asked the patient to cut down the cigarettes until the quit date  Medicare annual wellness visit, subsequent     Assessment and plan 1  Medicare subsequent annual wellness examination overall the patient is clinically stable and doing well, we encouraged the patient to follow a healthy and balanced diet  We recommend that the patient exercise routinely approximately 30 minutes 5 times per week   We have reviewed the patient's vaccines and have made recommendations for updates if necessary consider the shingles vaccine will order screening PSA        We will be ordering screening laboratories which are age appropriate  Return to the office in      call if any problems  Other Visit Diagnoses     Screening for colorectal cancer    -  Primary    Encounter for screening mammogram for breast cancer        Screening PSA (prostate specific antigen)        Relevant Orders    PSA, Total Screen          RTO in 6 months call if any problems  Subjective:      Patient ID: Kacey Mason is a 73 y o  male  HPI 79-year old male coming in for a follow up visit regarding hypertriglyceridemia, hypertension, overweight, tobacco abuse;  The patient reports me compliant taking medications without untoward side effects the  The patient is here to review his medical condition, update me on the medical condition and the patient reports me no hospitalizations and no ER visits  No major injuries or illnesses overall is doing well here to review laboratories reports me trying to follow healthy diet remains active at this point time he does not want to quit smoking but is slowly trying to cut back he would like to hold off on the screening CT scan but will let us know in the future if interested    The following portions of the patient's history were reviewed and updated as appropriate: allergies, current medications, past family history, past medical history, past social history, past surgical history and problem list     Review of Systems   Constitutional: Negative for activity change, appetite change and unexpected weight change  HENT: Negative for congestion and postnasal drip  Eyes: Negative for visual disturbance  Respiratory: Negative for cough and shortness of breath  Cardiovascular: Negative for chest pain  Gastrointestinal: Negative for abdominal pain, diarrhea, nausea and vomiting  Neurological: Negative for dizziness, light-headedness and headaches  Hematological: Negative for adenopathy  Objective:    No follow-ups on file  Procedure: XR spine cervical complete 4 or 5 vw non injury    Result Date: 2/20/2022  Narrative: CERVICAL SPINE INDICATION:   M54 2: Cervicalgia  COMPARISON:  11/15/2008 VIEWS:  XR SPINE CERVICAL COMPLETE 4 OR 5 VW NON INJURY FINDINGS: No fracture  There is slight anterolisthesis of C4 on C5  The intervertebral disc spaces are preserved  There are small anterior osteophytes from C4 through C6  There is facet arthrosis at C4-5, left greater than right  There is mild to mild narrowing of the left C4-5 neural foramen secondary to uncovertebral hypertrophy   There is mild narrowing on the right at C3-4 through C5-6  The prevertebral soft tissues are within normal limits  The lung apices are clear  Impression: Mild spondylosis primarily from C4 through C6 with slight anterolisthesis and facet arthrosis of C4 on C5  Mild bilateral neural foraminal narrowing, particularly at C4-5  Workstation performed: [id number]         Allergies   Allergen Reactions    Penicillins      Other reaction(s): Other (See Comments)  Unknown  Other reaction(s): Other (See Comments)  Unknown  Other reaction(s): Unknown  Category: Allergy;         Past Medical History:   Diagnosis Date    Adenocarcinoma of prostate [facility])     [id number]  LAST ASSESSED    Diabetes mellitus ([facility] [address] )     Hypertension      Past Surgical History:   Procedure Laterality Date    COLON SURGERY      HERNIA REPAIR      SHOULDER SURGERY      TONSILLECTOMY      TONSILLECTOMY AND ADENOIDECTOMY       Current Outpatient Medications on File Prior to Visit   Medication Sig Dispense Refill    albuterol (PROVENTIL HFA,VENTOLIN HFA) 90 mcg/act inhaler Inhale 2 puffs every 6 (six) hours as needed for wheezing 1 Inhaler 1    amLODIPine (NORVASC) 10 mg tablet TAKE 1 TABLET BY MOUTH EVERY DAY 90 tablet 1    budesonide (RHINOCORT ALLERGY) 32 MCG/ACT nasal spray 1 spray into each nostril daily      budesonide (RINOCORT AQUA) 32 MCG/ACT nasal spray 1 spray into each nostril daily 1 Bottle 4    Cholecalciferol (VITAMIN D3) 1000 units CAPS Take 1 capsule by mouth daily      cyanocobalamin (VITAMIN B-12) 1,000 mcg tablet Take by mouth daily      fenofibrate (TRICOR) 145 mg tablet TAKE 1 TABLET BY MOUTH EVERY DAY 30 tablet 5    hydrochlorothiazide (MICROZIDE) 12 5 mg capsule TAKE 1 CAPSULE BY MOUTH EVERY DAY 90 capsule 1    ibuprofen (MOTRIN) 800 mg tablet Take 800 mg by mouth every 8 (eight) hours as needed  0    lisinopril (ZESTRIL) 30 mg tablet TAKE 1 TABLET BY MOUTH EVERY DAY 90 tablet 1    metFORMIN (GLUCOPHAGE) 500 mg tablet TAKE 1 TABLET BY MOUTH TWICE A DAY WITH MEALS 180 tablet 1    Multiple Vitamin (MULTIVITAMIN) tablet Take 1 tablet by mouth daily      Omega-3 Fatty Acids (FISH OIL) 1,000 mg Take 3 capsules by mouth daily      VITAMIN B COMPLEX-C CAPS Take 1 capsule by mouth daily       No current facility-administered medications on file prior to visit       Family History   Problem Relation Age of Onset    Diabetes Father         MELLITUS     Social History     Socioeconomic History    Marital status: /Civil Union     Spouse name: Not on file    Number of children: Not on file    Years of education: Not on file    Highest education level: Not on file   Occupational History    Not on file   Tobacco Use    Smoking status: Current Every Day Smoker     Packs/day: 1 00     Types: Cigarettes    Smokeless tobacco: Never Used   Vaping Use    Vaping Use: Never used   Substance and Sexual Activity    Alcohol use: Not Currently     Comment: SOCIAL    Drug use: No    Sexual activity: Not on file   Other Topics Concern    Not on file   Social History Narrative    COPY OF ADVANCE DIRECTIVE OBTAINED     Social Determinants of Health     Financial Resource Strain: Not on file   Food Insecurity: Not on file   Transportation Needs: Not on file   Physical Activity: Not on file   Stress: Not on file   Social Connections: Not on file   Intimate Partner Violence: Not on file   Housing Stability: Not on file     Vitals:    03/14/22 0830   BP: 132/72   Pulse: 86   Resp: 16   SpO2: 99%   Weight: 85 5 kg (188 lb 9 6 oz)   Height: 5' 9" (1 753 m)     Results for orders placed or performed in visit on 03/08/22   Comprehensive metabolic panel   Result Value Ref Range    Sodium 138 136 - 145 mmol/L    Potassium 3 9 3 5 - 5 3 mmol/L    Chloride 109 (H) 100 - 108 mmol/L    CO2 23 21 - 32 mmol/L    ANION GAP 6 4 - 13 mmol/L    BUN 16 5 - 25 mg/dL    Creatinine 0 84 0 60 - 1 30 mg/dL    Glucose, Fasting 109 (H) 65 - 99 mg/dL    Calcium 9 6 8 3 - 10 1 mg/dL    AST 12 5 - 45 U/L ALT 30 12 - 78 U/L    Alkaline Phosphatase 84 46 - 116 U/L    Total Protein 7 9 6 4 - 8 2 g/dL    Albumin 4 0 3 5 - 5 0 g/dL    Total Bilirubin 0 49 0 20 - 1 00 mg/dL    eGFR 91 ml/min/1 73sq m   Hemoglobin A1C   Result Value Ref Range    Hemoglobin A1C 6 8 (H) Normal 3 8-5 6%; PreDiabetic 5 7-6 4%; Diabetic >=6 5%; Glycemic control for adults with diabetes <7 0% %     mg/dl   Lipid Panel with Direct LDL reflex   Result Value Ref Range    Cholesterol 204 (H) See Comment mg/dL    Triglycerides 244 (H) See Comment mg/dL    HDL, Direct 38 (L) >=40 mg/dL    LDL Calculated 117 (H) 0 - 100 mg/dL   Microalbumin / creatinine urine ratio   Result Value Ref Range    Creatinine, Ur 121 0 mg/dL    Microalbum  ,U,Random 25 3 (H) 0 0 - 20 0 mg/L    Microalb Creat Ratio 21 0 - 30 mg/g creatinine   HIV 1/2 ANTIGEN/ANTIBODY (4TH GENERATION) W REFLEX [facility]   Result Value Ref Range    HIV-1/HIV-2 Ab Non-Reactive Non-Reactive     Weight (last 2 days)     Date/Time Weight    03/14/22 0830 85 5 (188 6)        Body mass index is 27 85 kg/m²  BP      Temp      Pulse     Resp      SpO2        Vitals:    03/14/22 0830   Weight: 85 5 kg (188 lb 9 6 oz)     Vitals:    03/14/22 0830   Weight: 85 5 kg (188 lb 9 6 oz)       /72   Pulse 86   Resp 16   Ht 5' 9" (1 753 m)   Wt 85 5 kg (188 lb 9 6 oz)   SpO2 99%   BMI 27 85 kg/m²          Physical Exam  Constitutional:       General: He is not in acute distress  Appearance: He is well-developed  He is not diaphoretic  HENT:      Head: Normocephalic and atraumatic  Right Ear: External ear normal       Left Ear: External ear normal    Eyes:      General: No scleral icterus  Right eye: No discharge  Left eye: No discharge  Conjunctiva/sclera: Conjunctivae normal       Pupils: Pupils are equal, round, and reactive to light  Cardiovascular:      Rate and Rhythm: Normal rate and regular rhythm  Heart sounds: Normal heart sounds  No murmur heard    No friction rub  No gallop  Pulmonary:      Effort: No respiratory distress  Breath sounds: No wheezing or rales  Abdominal:      General: Bowel sounds are normal  There is no distension  Palpations: Abdomen is soft  There is no mass  Tenderness: There is no abdominal tenderness  There is no guarding or rebound  Musculoskeletal:         General: No deformity  Cervical back: Neck supple  Lymphadenopathy:      Cervical: No cervical adenopathy  Neurological:      Mental Status: He is alert

## 2022-03-14 NOTE — TELEPHONE ENCOUNTER
The patient would like you to order urine testing added to his current orders  He had one done in march  Please advise   Thank you

## 2022-03-15 ENCOUNTER — OFFICE VISIT (OUTPATIENT)
Dept: PHYSICAL THERAPY | Age: 67
End: 2022-03-15
Payer: MEDICARE

## 2022-03-15 DIAGNOSIS — G89.29 CHRONIC LOW BACK PAIN, UNSPECIFIED BACK PAIN LATERALITY, UNSPECIFIED WHETHER SCIATICA PRESENT: ICD-10-CM

## 2022-03-15 DIAGNOSIS — M54.50 CHRONIC LOW BACK PAIN, UNSPECIFIED BACK PAIN LATERALITY, UNSPECIFIED WHETHER SCIATICA PRESENT: ICD-10-CM

## 2022-03-15 DIAGNOSIS — M54.2 NECK PAIN: Primary | ICD-10-CM

## 2022-03-15 PROCEDURE — 97112 NEUROMUSCULAR REEDUCATION: CPT | Performed by: PHYSICAL THERAPIST

## 2022-03-15 PROCEDURE — 97140 MANUAL THERAPY 1/> REGIONS: CPT | Performed by: PHYSICAL THERAPIST

## 2022-03-15 PROCEDURE — 97110 THERAPEUTIC EXERCISES: CPT | Performed by: PHYSICAL THERAPIST

## 2022-03-15 NOTE — PROGRESS NOTES
Daily Note     Today's date: 3/15/2022  Patient name: Donny Medina  : 1955  MRN: [id number]  Referring provider: Hudson Arreaga MD  Dx:   Encounter Diagnosis     ICD-10-CM    1  Neck pain  M54 2    2  Chronic low back pain, unspecified back pain laterality, unspecified whether sciatica present  M54 50     G89 29                   Subjective: Reports feeling much better coming in to therapy today  Objective: See treatment diary below      Assessment: Tolerated treatment well  Patient would benefit from continued PT, did well with exercises today with no complaints of pain  Plan: Continue per plan of care        Precautions: COPD, DM       Manuals 3/2  3/7/22 3/11/22 3/15          Cervical 3-7 CPA's  CW   CW         Cervical side glides 3-7 CW   CW         Cervical ROM CW  VK VK CW          Thoracolumbar CPA's T8-L4 CW    CW          Neuro Re-Ed  3/7/22 3/11/22          Bridges  20x5"  2x10x5" 2x15x5" 2x10x5" RTB          SLR 3way 2x10 ea 2x10 ea 2x10 ea BL 2x15 ea B/L         rows 2x15 GTB btb 2x15 btb 2x15 BTB 2x20          LPD 2x15 GTB gtb 2x15 gtb 2x15 BTB 2x20          Multifidi press  gtb 20x ea gtb 20x ea GTB 20x ea          Thoracolumbar mini rotations  nv nv 15x ea 10#                       Ther Ex  3/7/22 3/11/22          Bike  8'  10' 10' 10'          Self snags cervical rotation B/L 10x10" ea  10x10" ea 10x10" 10x10" ea          Lateral side glides (hips to R)  30x 30x 3x10 3x10                                                                           Ther Activity                                       Gait Training                                       Modalities

## 2022-03-18 ENCOUNTER — OFFICE VISIT (OUTPATIENT)
Dept: PHYSICAL THERAPY | Age: 67
End: 2022-03-18
Payer: MEDICARE

## 2022-03-18 DIAGNOSIS — M54.2 NECK PAIN: Primary | ICD-10-CM

## 2022-03-18 DIAGNOSIS — M54.50 CHRONIC LOW BACK PAIN, UNSPECIFIED BACK PAIN LATERALITY, UNSPECIFIED WHETHER SCIATICA PRESENT: ICD-10-CM

## 2022-03-18 DIAGNOSIS — G89.29 CHRONIC LOW BACK PAIN, UNSPECIFIED BACK PAIN LATERALITY, UNSPECIFIED WHETHER SCIATICA PRESENT: ICD-10-CM

## 2022-03-18 PROCEDURE — 97112 NEUROMUSCULAR REEDUCATION: CPT

## 2022-03-18 PROCEDURE — 97140 MANUAL THERAPY 1/> REGIONS: CPT

## 2022-03-18 PROCEDURE — 97110 THERAPEUTIC EXERCISES: CPT

## 2022-03-18 NOTE — PROGRESS NOTES
Daily Note     Today's date: 3/18/2022  Patient name: Daniel Brandon  : 1955  MRN: [id number]  Referring provider: Lia Duarte MD  Dx:   Encounter Diagnosis     ICD-10-CM    1  Neck pain  M54 2    2  Chronic low back pain, unspecified back pain laterality, unspecified whether sciatica present  M54 50     G89 29                   Subjective: pt reports back/neck feeling a little sore today, pt reports doing yard work yesterday which may have increased his sx, pt reports exercises have helped decrease his sx of constant pain      Objective: See treatment diary below      Assessment: relief with treatment noted, HEP compliance noted      Plan: Continue per plan of care  Progress treatment as tolerated         Precautions: COPD, DM       Manuals 3/2  3/7/22 3/11/22 3/15  3/18/22        Cervical 3-7 CPA's  CW   CW         Cervical side glides 3-7 CW   CW         Cervical ROM CW  VK VK CW  VK        Thoracolumbar CPA's T8-L4 CW    CW          Neuro Re-Ed  3/7/22 3/11/22  3/18/22        Bridges  20x5"  2x10x5" 2x15x5" 2x10x5" RTB  gtb 2x10x5"        SLR 3way 2x10 ea 2x10 ea 2x10 ea BL 2x15 ea B/L 2x15 ea BL        rows 2x15 GTB btb 2x15 btb 2x15 BTB 2x20  btb 2x20        LPD 2x15 GTB gtb 2x15 gtb 2x15 BTB 2x20  btb 2x20        Multifidi press  gtb 20x ea gtb 20x ea GTB 20x ea  gtb 20x ea        Thoracolumbar mini rotations  nv nv 15x ea 10#  10# 15x ea                     Ther Ex  3/7/22 3/11/22  3/18/22        Bike  8'  10' 10' 10'  10'        Self snags cervical rotation B/L 10x10" ea  10x10" ea 10x10" 10x10" ea  10x10" ea        Lateral side glides (hips to R)  30x 30x 3x10 3x10  3x10                                                                         Ther Activity                                       Gait Training                                       Modalities

## 2022-03-21 ENCOUNTER — OFFICE VISIT (OUTPATIENT)
Dept: PHYSICAL THERAPY | Age: 67
End: 2022-03-21
Payer: MEDICARE

## 2022-03-21 DIAGNOSIS — M54.2 NECK PAIN: Primary | ICD-10-CM

## 2022-03-21 DIAGNOSIS — G89.29 CHRONIC LOW BACK PAIN, UNSPECIFIED BACK PAIN LATERALITY, UNSPECIFIED WHETHER SCIATICA PRESENT: ICD-10-CM

## 2022-03-21 DIAGNOSIS — M54.50 CHRONIC LOW BACK PAIN, UNSPECIFIED BACK PAIN LATERALITY, UNSPECIFIED WHETHER SCIATICA PRESENT: ICD-10-CM

## 2022-03-21 PROCEDURE — 97112 NEUROMUSCULAR REEDUCATION: CPT | Performed by: PHYSICAL THERAPIST

## 2022-03-21 PROCEDURE — 97140 MANUAL THERAPY 1/> REGIONS: CPT | Performed by: PHYSICAL THERAPIST

## 2022-03-21 PROCEDURE — 97110 THERAPEUTIC EXERCISES: CPT | Performed by: PHYSICAL THERAPIST

## 2022-03-21 NOTE — PROGRESS NOTES
Daily Note     Today's date: 3/21/2022  Patient name: Tyron Pickard  : 1955  MRN: [id number]  Referring provider: Misha Chicas MD  Dx:   Encounter Diagnosis     ICD-10-CM    1  Neck pain  M54 2    2  Chronic low back pain, unspecified back pain laterality, unspecified whether sciatica present  M54 50     G89 29                   Subjective: Reports feeling tired today  Objective: See treatment diary below      Assessment: Tolerated treatment well  Patient would benefit from continued PT, did well with exercises today and was able to progress in both cervical and lumbar stabilization  Plan: Continue per plan of care        Precautions: COPD, DM       Manuals 3/15  3/18/22 3/21       Cervical 3-7 CPA's  CW  CW       Cervical side glides 3-7 CW  CW        Cervical ROM CW  VK CW        Thoracolumbar CPA's T8-L4 CW   CW        Neuro Re-Ed  3/18/22        Bridges  2x10x5" RTB  gtb 2x10x5" GTB 25x5"        SLR 3way 2x15 ea B/L 2x15 ea BL 2x10 ea B/L 2#        rows BTB 2x20  btb 2x20 2x15 20#       LPD BTB 2x20  btb 2x20 2x15 16#        Multifidi press GTB 20x ea  gtb 20x ea 20x ea 15#        Thoracolumbar mini rotations 15x ea 10#  10# 15x ea                  Ther Ex  3/18/22        Bike  10'  10' 10'        Self snags cervical rotation B/L 10x10" ea  10x10" ea np       Lateral side glides (hips to R)  3x10  3x10 30x                                                         Ther Activity                              Gait Training                              Modalities

## 2022-03-24 ENCOUNTER — OFFICE VISIT (OUTPATIENT)
Dept: PHYSICAL THERAPY | Age: 67
End: 2022-03-24
Payer: MEDICARE

## 2022-03-24 DIAGNOSIS — M54.50 CHRONIC LOW BACK PAIN, UNSPECIFIED BACK PAIN LATERALITY, UNSPECIFIED WHETHER SCIATICA PRESENT: ICD-10-CM

## 2022-03-24 DIAGNOSIS — M54.2 NECK PAIN: Primary | ICD-10-CM

## 2022-03-24 DIAGNOSIS — G89.29 CHRONIC LOW BACK PAIN, UNSPECIFIED BACK PAIN LATERALITY, UNSPECIFIED WHETHER SCIATICA PRESENT: ICD-10-CM

## 2022-03-24 PROCEDURE — 97110 THERAPEUTIC EXERCISES: CPT | Performed by: PHYSICAL THERAPIST

## 2022-03-24 PROCEDURE — 97140 MANUAL THERAPY 1/> REGIONS: CPT | Performed by: PHYSICAL THERAPIST

## 2022-03-24 PROCEDURE — 97112 NEUROMUSCULAR REEDUCATION: CPT | Performed by: PHYSICAL THERAPIST

## 2022-03-24 NOTE — PROGRESS NOTES
Daily Note     Today's date: 3/24/2022  Patient name: Priscila Olivares  : 1955  MRN: [id number]  Referring provider: David Rasmussen MD  Dx:   Encounter Diagnosis     ICD-10-CM    1  Neck pain  M54 2    2  Chronic low back pain, unspecified back pain laterality, unspecified whether sciatica present  M54 50     G89 29        Start Time: 1700  Stop Time: 1740  Total time in clinic (min): 40 minutes    Subjective: Pt reports pain in anterior chest, negative for cardiac symptoms  Objective: See treatment diary below      Assessment: Tolerated treatment well  First rib mobs were added due to new symptoms and relieved with manual techniques  Patient demonstrated fatigue post treatment and would benefit from continued PT      Plan: Continue per plan of care        Precautions: COPD, DM       Manuals 3/15  3/18/22 3/24       Cervical 3-7 CPA's  CW  JF       Cervical side glides 3-7 CW          Cervical ROM CW  VK         Clavicle and first rib mobs II-III   JF       Thoracolumbar CPA's T8-L4 CW   JF        Neuro Re-Ed  3/18/22        Bridges  2x10x5" RTB  gtb 2x10x5" GTB 25x5"        SLR 3way 2x15 ea B/L 2x15 ea BL 2x10 ea B/L 2#        rows BTB 2x20  btb 2x20 2x15 20#       LPD BTB 2x20  btb 2x20 2x15 16#        Multifidi press GTB 20x ea  gtb 20x ea 20x ea 15#        Thoracolumbar mini rotations 15x ea 10#  10# 15x ea                  Ther Ex  3/18/22        Bike  10'  10' 10'        Self snags cervical rotation B/L 10x10" ea  10x10" ea np       Lateral side glides (hips to R)  3x10  3x10 30x                                                         Ther Activity                              Gait Training                              Modalities

## 2022-03-25 ENCOUNTER — APPOINTMENT (OUTPATIENT)
Dept: PHYSICAL THERAPY | Age: 67
End: 2022-03-25
Payer: MEDICARE

## 2022-03-28 ENCOUNTER — OFFICE VISIT (OUTPATIENT)
Dept: PHYSICAL THERAPY | Age: 67
End: 2022-03-28
Payer: MEDICARE

## 2022-03-28 DIAGNOSIS — M54.50 CHRONIC LOW BACK PAIN, UNSPECIFIED BACK PAIN LATERALITY, UNSPECIFIED WHETHER SCIATICA PRESENT: ICD-10-CM

## 2022-03-28 DIAGNOSIS — G89.29 CHRONIC LOW BACK PAIN, UNSPECIFIED BACK PAIN LATERALITY, UNSPECIFIED WHETHER SCIATICA PRESENT: ICD-10-CM

## 2022-03-28 DIAGNOSIS — M54.2 NECK PAIN: Primary | ICD-10-CM

## 2022-03-28 PROCEDURE — 97110 THERAPEUTIC EXERCISES: CPT

## 2022-03-28 PROCEDURE — 97112 NEUROMUSCULAR REEDUCATION: CPT

## 2022-03-28 NOTE — PROGRESS NOTES
Daily Note     Today's date: 3/28/2022  Patient name: Kinsey Hess  : 1955  MRN: [id number]  Referring provider: Norah Richardson MD  Dx:   Encounter Diagnosis     ICD-10-CM    1  Neck pain  M54 2    2  Chronic low back pain, unspecified back pain laterality, unspecified whether sciatica present  M54 50     G89 29                   Subjective: Patient reports some neck/clavical pain today, pt reports his back is doing well today  Objective: See treatment diary below      Assessment: Tolerated treatment well  VCs to maintain good posture and correct TE form throughout  Plan: Continue per plan of care        Precautions: COPD, DM       Manuals 3/15  3/18/22 3/24 3/28      Cervical 3-7 CPA's  CW  JF CW      Cervical side glides 3-7 CW          Cervical ROM CW  VK         Clavicle and first rib mobs II-III   JF CW      Thoracolumbar CPA's T8-L4 CW   JF        Neuro Re-Ed  3/18/22        Bridges  2x10x5" RTB  gtb 2x10x5" GTB 25x5"  NV      SLR 3way 2x15 ea B/L 2x15 ea BL 2x10 ea B/L 2#  NV      rows BTB 2x20  btb 2x20 2x15 20# 2x15 20#      LPD BTB 2x20  btb 2x20 2x15 16#  2x15 16#      Multifidi press GTB 20x ea  gtb 20x ea 20x ea 15#  20x ea 15#      Thoracolumbar mini rotations 15x ea 10#  10# 15x ea  10# 15x ea                Ther Ex  3/18/22        Bike  10'  10' 10'  10'      Self snags cervical rotation B/L 10x10" ea  10x10" ea np 10x10"      Lateral side glides (hips to R)  3x10  3x10 30x 30x                                                        Ther Activity                              Gait Training                              Modalities

## 2022-04-01 ENCOUNTER — OFFICE VISIT (OUTPATIENT)
Dept: PHYSICAL THERAPY | Age: 67
End: 2022-04-01
Payer: MEDICARE

## 2022-04-01 DIAGNOSIS — M54.2 NECK PAIN: Primary | ICD-10-CM

## 2022-04-01 DIAGNOSIS — M54.50 CHRONIC LOW BACK PAIN, UNSPECIFIED BACK PAIN LATERALITY, UNSPECIFIED WHETHER SCIATICA PRESENT: ICD-10-CM

## 2022-04-01 DIAGNOSIS — G89.29 CHRONIC LOW BACK PAIN, UNSPECIFIED BACK PAIN LATERALITY, UNSPECIFIED WHETHER SCIATICA PRESENT: ICD-10-CM

## 2022-04-01 PROCEDURE — 97112 NEUROMUSCULAR REEDUCATION: CPT

## 2022-04-01 PROCEDURE — 97110 THERAPEUTIC EXERCISES: CPT

## 2022-04-01 PROCEDURE — 97140 MANUAL THERAPY 1/> REGIONS: CPT

## 2022-04-01 NOTE — PROGRESS NOTES
Daily Note     Today's date: 2022  Patient name: Juan Antonio Jennings  : 1955  MRN: [id number]  Referring provider: Lyn Mejia MD  Dx:   Encounter Diagnosis     ICD-10-CM    1  Neck pain  M54 2    2  Chronic low back pain, unspecified back pain laterality, unspecified whether sciatica present  M54 50     G89 29                   Subjective: today's the first day I have no pain      Objective: See treatment diary below      Assessment:  Pt doing well with ex progressions, no increased sx noted, relief with manual intervention noted       Plan: Continue per plan of care  Progress treatment as tolerated         Precautions: COPD, DM       Manuals 3/15  3/18/22 3/24 3/28 4/1/22     Cervical 3-7 CPA's  CW  JF CW      Cervical side glides 3-7 CW     JF     Cervical ROM CW  VK         Clavicle and first rib mobs II-III   JF CW      Thoracolumbar CPA's T8-L4 CW   JF        CTJ Grade V     JF               Neuro Re-Ed  3/18/22   4/1/22     Bridges  2x10x5" RTB  gtb 2x10x5" GTB 25x5"  NV gtb 2x15x5"     SLR 3way 2x15 ea B/L 2x15 ea BL 2x10 ea B/L 2#  NV nv     rows BTB 2x20  btb 2x20 2x15 20# 2x15 20# 20# 2x15     LPD BTB 2x20  btb 2x20 2x15 16#  2x15 16# 16# 2x15     Multifidi press GTB 20x ea  gtb 20x ea 20x ea 15#  20x ea 15# 15# 2x10 ea     Thoracolumbar mini rotations 15x ea 10#  10# 15x ea  10# 15x ea 10# 15xea               Ther Ex  3/18/22   4/1/22     Bike  10'  10' 10'  10' 10'     Self snags cervical rotation B/L 10x10" ea  10x10" ea np 10x10" 10x10" BL     Lateral side glides (hips to R)  3x10  3x10 30x 30x                                                        Ther Activity                              Gait Training                              Modalities

## 2022-04-04 ENCOUNTER — APPOINTMENT (OUTPATIENT)
Dept: PHYSICAL THERAPY | Age: 67
End: 2022-04-04
Payer: MEDICARE

## 2022-04-05 ENCOUNTER — APPOINTMENT (OUTPATIENT)
Dept: PHYSICAL THERAPY | Age: 67
End: 2022-04-05
Payer: MEDICARE

## 2022-04-08 ENCOUNTER — OFFICE VISIT (OUTPATIENT)
Dept: PHYSICAL THERAPY | Age: 67
End: 2022-04-08
Payer: MEDICARE

## 2022-04-08 DIAGNOSIS — M54.50 CHRONIC LOW BACK PAIN, UNSPECIFIED BACK PAIN LATERALITY, UNSPECIFIED WHETHER SCIATICA PRESENT: ICD-10-CM

## 2022-04-08 DIAGNOSIS — G89.29 CHRONIC LOW BACK PAIN, UNSPECIFIED BACK PAIN LATERALITY, UNSPECIFIED WHETHER SCIATICA PRESENT: ICD-10-CM

## 2022-04-08 DIAGNOSIS — M54.2 NECK PAIN: Primary | ICD-10-CM

## 2022-04-08 PROCEDURE — 97110 THERAPEUTIC EXERCISES: CPT

## 2022-04-08 PROCEDURE — 97112 NEUROMUSCULAR REEDUCATION: CPT

## 2022-04-08 NOTE — PROGRESS NOTES
Daily Note     Today's date: 2022  Patient name: Horace Pineda  : 1955  MRN: [id number]  Referring provider: Merrill Matute MD  Dx:   Encounter Diagnosis     ICD-10-CM    1  Neck pain  M54 2    2  Chronic low back pain, unspecified back pain laterality, unspecified whether sciatica present  M54 50     G89 29                   Subjective: pt reports neck feeling better but R LB area has had some pain, pt reports h o  LB pain    Objective: See treatment diary below      Assessment:  Pt had some decreased sx R LB/glut after performing prone hip ext      Plan: Continue per plan of care  Progress treatment as tolerated         Precautions: COPD, DM       Manuals 3/15  3/18/22 3/24 3/28 4/1/22     Cervical 3-7 CPA's  CW  JF CW      Cervical side glides 3-7 CW     JF     Cervical ROM CW  VK         Clavicle and first rib mobs II-III   JF CW      Thoracolumbar CPA's T8-L4 CW   JF        CTJ Grade V     JF               Neuro Re-Ed  3/18/22   4/1/22 4/8/22    Bridges  2x10x5" RTB  gtb 2x10x5" GTB 25x5"  NV gtb 2x15x5" gtb 2x15x5"    SLR 3way 2x15 ea B/L 2x15 ea BL 2x10 ea B/L 2#  NV nv 2# 2x10    rows BTB 2x20  btb 2x20 2x15 20# 2x15 20# 20# 2x15 20# 2x15    LPD BTB 2x20  btb 2x20 2x15 16#  2x15 16# 16# 2x15 16# 2x15    Multifidi press GTB 20x ea  gtb 20x ea 20x ea 15#  20x ea 15# 15# 2x10 ea 15# 2x10 ea    Thoracolumbar mini rotations 15x ea 10#  10# 15x ea  10# 15x ea 10# 15xea 10# 15x ea              Ther Ex  3/18/22   4/1/22 4/8/22    Bike  10'  10' 10'  10' 10' 10'    Self snags cervical rotation B/L 10x10" ea  10x10" ea np 10x10" 10x10" BL 10x10" ea BL    Lateral side glides (hips to R)  3x10  3x10 30x 30x                                                        Ther Activity                              Gait Training                              Modalities

## 2022-04-11 ENCOUNTER — OFFICE VISIT (OUTPATIENT)
Dept: PHYSICAL THERAPY | Age: 67
End: 2022-04-11
Payer: MEDICARE

## 2022-04-11 DIAGNOSIS — M54.50 CHRONIC LOW BACK PAIN, UNSPECIFIED BACK PAIN LATERALITY, UNSPECIFIED WHETHER SCIATICA PRESENT: ICD-10-CM

## 2022-04-11 DIAGNOSIS — G89.29 CHRONIC LOW BACK PAIN, UNSPECIFIED BACK PAIN LATERALITY, UNSPECIFIED WHETHER SCIATICA PRESENT: ICD-10-CM

## 2022-04-11 DIAGNOSIS — M54.2 NECK PAIN: Primary | ICD-10-CM

## 2022-04-11 PROCEDURE — 97112 NEUROMUSCULAR REEDUCATION: CPT | Performed by: PHYSICAL THERAPIST

## 2022-04-11 PROCEDURE — 97140 MANUAL THERAPY 1/> REGIONS: CPT | Performed by: PHYSICAL THERAPIST

## 2022-04-11 PROCEDURE — 97110 THERAPEUTIC EXERCISES: CPT | Performed by: PHYSICAL THERAPIST

## 2022-04-11 NOTE — PROGRESS NOTES
Daily Note     Today's date: 2022  Patient name: Stef Correa  : 1955  MRN: [id number]  Referring provider: Joy Maddox MD  Dx:   Encounter Diagnosis     ICD-10-CM    1  Neck pain  M54 2    2  Chronic low back pain, unspecified back pain laterality, unspecified whether sciatica present  M54 50     G89 29        Start Time: 1600  Stop Time: 1645  Total time in clinic (min): 45 minutes    Subjective: Pt reports improvements in cervical symptoms  Objective: See treatment diary below      Assessment: Tolerated treatment well  Pt's POC was progressed to include more lumbar interventions to address chronic back pain  Patient education was performed at TE  Patient demonstrated fatigue post treatment and would benefit from continued PT      Plan: Continue per plan of care        Precautions: COPD, DM       Manuals 3/15  3/18/22 3/24 3/28 4/1/22     Cervical 3-7 CPA's  CW  JF CW      Cervical side glides 3-7 CW     JF     Cervical ROM CW  VK         Clavicle and first rib mobs II-III   JF CW      Thoracolumbar CPA's T8-L4 CW   JF     JF   CTJ Grade V     JF               Neuro Re-Ed  3/18/22   4/1/22 4/8/22 4/11   Bridges  2x10x5" RTB  gtb 2x10x5" GTB 25x5"  NV gtb 2x15x5" gtb 2x15x5" 3*10   SLR 3way 2x15 ea B/L 2x15 ea BL 2x10 ea B/L 2#  NV nv 2# 2x10    rows BTB 2x20  btb 2x20 2x15 20# 2x15 20# 20# 2x15 20# 2x15    LPD BTB 2x20  btb 2x20 2x15 16#  2x15 16# 16# 2x15 16# 2x15    Multifidi press GTB 20x ea  gtb 20x ea 20x ea 15#  20x ea 15# 15# 2x10 ea 15# 2x10 ea    Thoracolumbar mini rotations 15x ea 10#  10# 15x ea  10# 15x ea 10# 15xea 10# 15x ea    BKFO       20x b/l   Ther Ex  3/18/22   4/1/22 4/8/22    Bike  10'  10' 10'  10' 10' 10' 10'   Self snags cervical rotation B/L 10x10" ea  10x10" ea np 10x10" 10x10" BL 10x10" ea BL    Lateral side glides (hips to R)  3x10  3x10 30x 30x      Open books       15*10" b/l   AMOS       5'   PPU       3*10                       Ther Activity Gait Training                              Modalities

## 2022-04-12 ENCOUNTER — APPOINTMENT (OUTPATIENT)
Dept: PHYSICAL THERAPY | Age: 67
End: 2022-04-12
Payer: MEDICARE

## 2022-04-15 ENCOUNTER — OFFICE VISIT (OUTPATIENT)
Dept: PHYSICAL THERAPY | Age: 67
End: 2022-04-15
Payer: MEDICARE

## 2022-04-15 DIAGNOSIS — M54.2 NECK PAIN: Primary | ICD-10-CM

## 2022-04-15 DIAGNOSIS — G89.29 CHRONIC LOW BACK PAIN, UNSPECIFIED BACK PAIN LATERALITY, UNSPECIFIED WHETHER SCIATICA PRESENT: ICD-10-CM

## 2022-04-15 DIAGNOSIS — M54.50 CHRONIC LOW BACK PAIN, UNSPECIFIED BACK PAIN LATERALITY, UNSPECIFIED WHETHER SCIATICA PRESENT: ICD-10-CM

## 2022-04-15 PROCEDURE — 97110 THERAPEUTIC EXERCISES: CPT

## 2022-04-15 PROCEDURE — 97140 MANUAL THERAPY 1/> REGIONS: CPT

## 2022-04-15 PROCEDURE — 97112 NEUROMUSCULAR REEDUCATION: CPT

## 2022-04-15 NOTE — PROGRESS NOTES
Daily Note     Today's date: 4/15/2022  Patient name: Gordy Moran  : 1955  MRN: [id number]  Referring provider: Kashif Sandhu MD  Dx:   Encounter Diagnosis     ICD-10-CM    1  Neck pain  M54 2    2  Chronic low back pain, unspecified back pain laterality, unspecified whether sciatica present  M54 50     G89 29                   Subjective: pt reports tripping over dogleash and falling onto R side and pt reports he's sore but doesn't need to see Dr at this time      Objective: See treatment diary below      Assessment:  Pt felt better after manual intervention, pt demonstrated guarded mobility at times during cervical ROM      Plan: Continue per plan of care  Progress treatment as tolerated         Precautions: COPD, DM       Manuals 3/18/22 3/24 3/28 4/1/22   4/15/22   Cervical 3-7 CPA's   JF CW       Cervical side glides 3-7     JF      Cervical ROM VK       VK   Clavicle and first rib mobs II-III  JF CW       Thoracolumbar CPA's T8-L4  JF     JF    CTJ Grade V    JF                Neuro Re-Ed 3/18/22   4/1/22 4/8/22 4/11 4/15/22   Bridges  gtb 2x10x5" GTB 25x5"  NV gtb 2x15x5" gtb 2x15x5" 3*10 3x10x5"   SLR 3way 2x15 ea BL 2x10 ea B/L 2#  NV nv 2# 2x10     rows btb 2x20 2x15 20# 2x15 20# 20# 2x15 20# 2x15  20# 3x10   LPD btb 2x20 2x15 16#  2x15 16# 16# 2x15 16# 2x15  16# 3x10   Multifidi press gtb 20x ea 20x ea 15#  20x ea 15# 15# 2x10 ea 15# 2x10 ea  15# 2x10 ea BL   Thoracolumbar mini rotations 10# 15x ea  10# 15x ea 10# 15xea 10# 15x ea  10# 10x ea BL   BKFO      20x b/l 2x10 ea BL   Ther Ex 3/18/22   4/1/22 4/8/22     Bike  10' 10'  10' 10' 10' 10' 10'   Self snags cervical rotation B/L 10x10" ea np 10x10" 10x10" BL 10x10" ea BL  10x10" ea BL   Lateral side glides (hips to R)  3x10 30x 30x       Open books      15*10" b/l 15x10" ea BL   AMOS      5'    PPU      3*10    Supine chin tucks       2x10x5"             Ther Activity                              Gait Training Modalities

## 2022-04-19 ENCOUNTER — OFFICE VISIT (OUTPATIENT)
Dept: PHYSICAL THERAPY | Age: 67
End: 2022-04-19
Payer: MEDICARE

## 2022-04-19 DIAGNOSIS — G89.29 CHRONIC LOW BACK PAIN, UNSPECIFIED BACK PAIN LATERALITY, UNSPECIFIED WHETHER SCIATICA PRESENT: ICD-10-CM

## 2022-04-19 DIAGNOSIS — M54.50 CHRONIC LOW BACK PAIN, UNSPECIFIED BACK PAIN LATERALITY, UNSPECIFIED WHETHER SCIATICA PRESENT: ICD-10-CM

## 2022-04-19 DIAGNOSIS — M54.2 NECK PAIN: Primary | ICD-10-CM

## 2022-04-19 PROCEDURE — 97110 THERAPEUTIC EXERCISES: CPT

## 2022-04-19 PROCEDURE — 97112 NEUROMUSCULAR REEDUCATION: CPT

## 2022-04-19 PROCEDURE — 97140 MANUAL THERAPY 1/> REGIONS: CPT

## 2022-04-19 NOTE — PROGRESS NOTES
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 " pt reports he still feels limited with cervical extension       Objective: See treatment diary below      Assessment: pt guarded with cervical ROM however pt stated he feels better today vs last session, ex progressions for strengthening ex challenging but gracia well, relief with manual intervention     Plan: Continue per plan of care  Progress treatment as tolerated         Precautions: COPD, DM       Manuals 3/18/22 3/24 3/28 4/1/22   4/15/22 4/19/22   Cervical 3-7 CPA's   JF CW     CW   Cervical side glides 3-7     JF       Cervical ROM VK       VK    Clavicle and first rib mobs II-III  JF CW        Thoracolumbar CPA's T8-L4  JF     JF     CTJ Grade V    JF                  Neuro Re-Ed 3/18/22   4/1/22 4/8/22 4/11 4/15/22 4/19/22   Bridges  gtb 2x10x5" GTB 25x5"  NV gtb 2x15x5" gtb 2x15x5" 3*10 3x10x5" 3x10x5"   SLR 3way 2x15 ea BL 2x10 ea B/L 2#  NV nv 2# 2x10      rows btb 2x20 2x15 20# 2x15 20# 20# 2x15 20# 2x15  20# 3x10 21# 2x15   LPD btb 2x20 2x15 16#  2x15 16# 16# 2x15 16# 2x15  16# 3x10 17# 2x15   Multifidi press gtb 20x ea 20x ea 15#  20x ea 15# 15# 2x10 ea 15# 2x10 ea  15# 2x10 ea BL 15# 2x10 ea BL   Thoracolumbar mini rotations 10# 15x ea  10# 15x ea 10# 15xea 10# 15x ea  10# 10x ea BL 10# 10x ea BL   BKFO      20x b/l 2x10 ea BL 2x10 ea BL   Ther Ex 3/18/22   4/1/22 4/8/22      Bike  10' 10'  10' 10' 10' 10' 10' 10'   Self snags cervical rotation B/L 10x10" ea np 10x10" 10x10" BL 10x10" ea BL  10x10" ea BL 10x5" ea BL   Lateral side glides (hips to R) 3x10 30x 30x        Open books      15*10" b/l 15x10" ea BL 15x10" eaBL   AMOS      5'     PPU      3*10     Supine chin tucks       2x10x5" 2x10x5"              Ther Activity                                 Gait Training                                 Modalities                                    1:1 treatment [phone]

## 2022-04-22 ENCOUNTER — OFFICE VISIT (OUTPATIENT)
Dept: PHYSICAL THERAPY | Age: 67
End: 2022-04-22
Payer: MEDICARE

## 2022-04-22 DIAGNOSIS — M54.2 NECK PAIN: Primary | ICD-10-CM

## 2022-04-22 DIAGNOSIS — G89.29 CHRONIC LOW BACK PAIN, UNSPECIFIED BACK PAIN LATERALITY, UNSPECIFIED WHETHER SCIATICA PRESENT: ICD-10-CM

## 2022-04-22 DIAGNOSIS — M54.50 CHRONIC LOW BACK PAIN, UNSPECIFIED BACK PAIN LATERALITY, UNSPECIFIED WHETHER SCIATICA PRESENT: ICD-10-CM

## 2022-04-22 PROCEDURE — 97140 MANUAL THERAPY 1/> REGIONS: CPT

## 2022-04-22 PROCEDURE — 97112 NEUROMUSCULAR REEDUCATION: CPT

## 2022-04-22 PROCEDURE — 97110 THERAPEUTIC EXERCISES: CPT

## 2022-04-22 NOTE — PROGRESS NOTES
Daily Note     Today's date: 2022  Patient name: Lucas Ott  : 1955  MRN: [id number]  Referring provider: Reza Mendoza MD  Dx:   Encounter Diagnosis     ICD-10-CM    1  Neck pain  M54 2    2  Chronic low back pain, unspecified back pain laterality, unspecified whether sciatica present  M54 50     G89 29                   Subjective: pt reports feeling better today, pt reports he lifted heavy buckets yesterday and felt ok, no increased sx      Objective: See treatment diary below      Assessment: relief with treatment noted, added scalene stretch to increased cervical spine mobility      Plan: Continue per plan of care  Progress treatment as tolerated         Precautions: COPD, DM       Manuals 3/18/22 3/24 3/28 4/1/22   4/15/22 4/19/22 4/22/22   Cervical 3-7 CPA's   JF CW     CW JF   Cervical side glides 3-7     JF     JF   Cervical ROM VK       VK     Clavicle and first rib mobs II-III  JF CW         Thoracolumbar CPA's T8-L4  JF     JF      CTJ Grade V    JF                    Neuro Re-Ed 3/18/22   4/1/22 4/8/22 4/11 4/15/22 4/19/22 4/22/22   Bridges  gtb 2x10x5" GTB 25x5"  NV gtb 2x15x5" gtb 2x15x5" 3*10 3x10x5" 3x10x5" 3x10x5"   SLR 3way 2x15 ea BL 2x10 ea B/L 2#  NV nv 2# 2x10       rows btb 2x20 2x15 20# 2x15 20# 20# 2x15 20# 2x15  20# 3x10 21# 2x15 21# 3x10   LPD btb 2x20 2x15 16#  2x15 16# 16# 2x15 16# 2x15  16# 3x10 17# 2x15 17# 3x10   Multifidi press gtb 20x ea 20x ea 15#  20x ea 15# 15# 2x10 ea 15# 2x10 ea  15# 2x10 ea BL 15# 2x10 ea BL 15# 2x10 ea BL   Thoracolumbar mini rotations 10# 15x ea  10# 15x ea 10# 15xea 10# 15x ea  10# 10x ea BL 10# 10x ea BL 10# 10x ea  BL   BKFO      20x b/l 2x10 ea BL 2x10 ea BL 2x10ea BL   Ther Ex 3/18/22   4/1/22 4/8/22       Bike  10' 10'  10' 10' 10' 10' 10' 10' 10'   Self snags cervical rotation B/L 10x10" ea np 10x10" 10x10" BL 10x10" ea BL  10x10" ea BL 10x5" ea BL 10x5" ea BL   Lateral side glides (hips to R)  3x10 30x 30x         Open books 15*10" b/l 15x10" ea BL 15x10" eaBL 15x10" ea BL   AMOS      5'      PPU      3*10      Supine chin tucks       2x10x5" 2x10x5" 2x10x5"   Scalene stretch against wall         With towel HEP   Ther Activity                                    Gait Training                                    Modalities

## 2022-04-25 ENCOUNTER — APPOINTMENT (OUTPATIENT)
Dept: PHYSICAL THERAPY | Age: 67
End: 2022-04-25
Payer: MEDICARE

## 2022-04-29 ENCOUNTER — APPOINTMENT (OUTPATIENT)
Dept: PHYSICAL THERAPY | Age: 67
End: 2022-04-29
Payer: MEDICARE

## 2022-05-02 NOTE — PROGRESS NOTES
Patient would like to be D/C due to lack of time to attend therapy, no formal re-evaluation performed and goals were not assessed

## 2022-05-14 DIAGNOSIS — E78.1 HYPERTRIGLYCERIDEMIA: ICD-10-CM

## 2022-05-14 RX ORDER — FENOFIBRATE 145 MG/1
TABLET, COATED ORAL
Qty: 30 TABLET | Refills: 5 | Status: SHIPPED | OUTPATIENT
Start: 2022-05-14

## 2022-08-26 ENCOUNTER — PROBLEM (OUTPATIENT)
Dept: URBAN - METROPOLITAN AREA CLINIC 6 | Facility: CLINIC | Age: 67
End: 2022-08-26

## 2022-08-26 DIAGNOSIS — H04.123: ICD-10-CM

## 2022-08-26 PROCEDURE — 92012 INTRM OPH EXAM EST PATIENT: CPT

## 2022-08-26 ASSESSMENT — VISUAL ACUITY
OS_CC: 20/30+1
OD_CC: 20/25-1
OU_CC: J1+

## 2022-08-26 ASSESSMENT — TONOMETRY
OD_IOP_MMHG: 12
OS_IOP_MMHG: 12

## 2022-09-13 ENCOUNTER — ESTABLISHED COMPREHENSIVE EXAM (OUTPATIENT)
Dept: URBAN - METROPOLITAN AREA CLINIC 6 | Facility: CLINIC | Age: 67
End: 2022-09-13

## 2022-09-13 DIAGNOSIS — E11.9: ICD-10-CM

## 2022-09-13 DIAGNOSIS — H25.813: ICD-10-CM

## 2022-09-13 DIAGNOSIS — H04.123: ICD-10-CM

## 2022-09-13 DIAGNOSIS — H34.8120: ICD-10-CM

## 2022-09-13 PROCEDURE — 92014 COMPRE OPH EXAM EST PT 1/>: CPT

## 2022-09-13 ASSESSMENT — TONOMETRY
OD_IOP_MMHG: 13
OS_IOP_MMHG: 12

## 2022-09-13 ASSESSMENT — VISUAL ACUITY
OS_SC: 20/80-1
OD_CC: 20/25
OU_CC: J2
OU_SC: 20/30
OS_CC: 20/40+2
OD_SC: 20/40

## 2022-10-06 DIAGNOSIS — I10 ESSENTIAL HYPERTENSION: ICD-10-CM

## 2022-10-06 RX ORDER — AMLODIPINE BESYLATE 10 MG/1
TABLET ORAL
Qty: 90 TABLET | Refills: 1 | Status: SHIPPED | OUTPATIENT
Start: 2022-10-06

## 2022-10-10 ENCOUNTER — APPOINTMENT (OUTPATIENT)
Dept: LAB | Age: 67
End: 2022-10-10
Payer: MEDICARE

## 2022-10-10 DIAGNOSIS — E78.1 HYPERTRIGLYCERIDEMIA: ICD-10-CM

## 2022-10-10 DIAGNOSIS — Z12.5 SCREENING PSA (PROSTATE SPECIFIC ANTIGEN): ICD-10-CM

## 2022-10-10 LAB
ALBUMIN SERPL BCP-MCNC: 3.7 G/DL (ref 3.5–5)
ALP SERPL-CCNC: 99 U/L (ref 46–116)
ALT SERPL W P-5'-P-CCNC: 35 U/L (ref 12–78)
ANION GAP SERPL CALCULATED.3IONS-SCNC: 7 MMOL/L (ref 4–13)
AST SERPL W P-5'-P-CCNC: 15 U/L (ref 5–45)
BILIRUB SERPL-MCNC: 0.39 MG/DL (ref 0.2–1)
BUN SERPL-MCNC: 20 MG/DL (ref 5–25)
CALCIUM SERPL-MCNC: 10 MG/DL (ref 8.3–10.1)
CHLORIDE SERPL-SCNC: 109 MMOL/L (ref 96–108)
CHOLEST SERPL-MCNC: 192 MG/DL
CO2 SERPL-SCNC: 22 MMOL/L (ref 21–32)
CREAT SERPL-MCNC: 0.97 MG/DL (ref 0.6–1.3)
GFR SERPL CREATININE-BSD FRML MDRD: 81 ML/MIN/1.73SQ M
GLUCOSE P FAST SERPL-MCNC: 122 MG/DL (ref 65–99)
HDLC SERPL-MCNC: 35 MG/DL
LDLC SERPL CALC-MCNC: 120 MG/DL (ref 0–100)
POTASSIUM SERPL-SCNC: 4.2 MMOL/L (ref 3.5–5.3)
PROT SERPL-MCNC: 7.9 G/DL (ref 6.4–8.4)
PSA SERPL-MCNC: 4.7 NG/ML (ref 0–4)
SODIUM SERPL-SCNC: 138 MMOL/L (ref 135–147)
TRIGL SERPL-MCNC: 187 MG/DL

## 2022-10-10 PROCEDURE — 80061 LIPID PANEL: CPT

## 2022-10-10 PROCEDURE — 80053 COMPREHEN METABOLIC PANEL: CPT

## 2022-10-11 DIAGNOSIS — R97.20 ELEVATED PSA: Primary | ICD-10-CM

## 2022-10-11 PROBLEM — Z00.00 MEDICARE ANNUAL WELLNESS VISIT, SUBSEQUENT: Status: RESOLVED | Noted: 2022-03-14 | Resolved: 2022-10-11

## 2022-10-19 ENCOUNTER — OFFICE VISIT (OUTPATIENT)
Dept: INTERNAL MEDICINE CLINIC | Facility: CLINIC | Age: 67
End: 2022-10-19
Payer: MEDICARE

## 2022-10-19 VITALS
HEIGHT: 69 IN | WEIGHT: 186.4 LBS | HEART RATE: 85 BPM | DIASTOLIC BLOOD PRESSURE: 74 MMHG | OXYGEN SATURATION: 95 % | BODY MASS INDEX: 27.61 KG/M2 | SYSTOLIC BLOOD PRESSURE: 138 MMHG

## 2022-10-19 DIAGNOSIS — Z23 NEED FOR VACCINATION: ICD-10-CM

## 2022-10-19 DIAGNOSIS — I10 PRIMARY HYPERTENSION: ICD-10-CM

## 2022-10-19 DIAGNOSIS — R97.20 INCREASED PROSTATE SPECIFIC ANTIGEN (PSA) VELOCITY: ICD-10-CM

## 2022-10-19 DIAGNOSIS — E11.9 TYPE 2 DIABETES MELLITUS WITHOUT COMPLICATION, WITHOUT LONG-TERM CURRENT USE OF INSULIN (HCC): ICD-10-CM

## 2022-10-19 DIAGNOSIS — E13.9 DIABETES 1.5, MANAGED AS TYPE 2 (HCC): ICD-10-CM

## 2022-10-19 DIAGNOSIS — Z23 ENCOUNTER FOR IMMUNIZATION: ICD-10-CM

## 2022-10-19 DIAGNOSIS — E66.3 OVERWEIGHT (BMI 25.0-29.9): ICD-10-CM

## 2022-10-19 DIAGNOSIS — J44.9 CHRONIC OBSTRUCTIVE PULMONARY DISEASE, UNSPECIFIED COPD TYPE (HCC): ICD-10-CM

## 2022-10-19 DIAGNOSIS — E78.5 DYSLIPIDEMIA: Primary | ICD-10-CM

## 2022-10-19 LAB — SL AMB POCT HEMOGLOBIN AIC: 7.2 (ref ?–6.5)

## 2022-10-19 PROCEDURE — 99214 OFFICE O/P EST MOD 30 MIN: CPT | Performed by: INTERNAL MEDICINE

## 2022-10-19 PROCEDURE — 83036 HEMOGLOBIN GLYCOSYLATED A1C: CPT | Performed by: INTERNAL MEDICINE

## 2022-10-19 PROCEDURE — G0008 ADMIN INFLUENZA VIRUS VAC: HCPCS

## 2022-10-19 PROCEDURE — 90662 IIV NO PRSV INCREASED AG IM: CPT

## 2022-10-19 RX ORDER — ATORVASTATIN CALCIUM 10 MG/1
10 TABLET, FILM COATED ORAL DAILY
Qty: 30 TABLET | Refills: 3 | Status: SHIPPED | OUTPATIENT
Start: 2022-10-19

## 2022-10-20 PROBLEM — J44.9 CHRONIC OBSTRUCTIVE PULMONARY DISEASE, UNSPECIFIED COPD TYPE (HCC): Status: ACTIVE | Noted: 2021-01-09

## 2022-10-20 PROBLEM — R97.20 INCREASED PROSTATE SPECIFIC ANTIGEN (PSA) VELOCITY: Status: ACTIVE | Noted: 2022-10-20

## 2022-10-20 NOTE — ASSESSMENT & PLAN NOTE
Clinically stable and doing well continue the current medical regiment will continue monitor    Encourage smoking cessation

## 2022-10-20 NOTE — ASSESSMENT & PLAN NOTE
Today we did have a long conversation about the elevation of the PSA I will refer him to Urology for further evaluation I suspect he will likely need MRI of the prostate in further evaluation will defer to Urology

## 2022-10-20 NOTE — ASSESSMENT & PLAN NOTE
Lab Results   Component Value Date    HGBA1C 7 2 (A) 10/19/2022   I have counselled the pt to follow a healthy and balanced diet ,and recommend routine exercise  Suboptimal controlled will increase metformin to 1000 mg b i d  I will be ordering diabetic laboratories including comprehensive metabolic panel, hemoglobin A1c, urine microalbumin, lipid panel    Target A1c under 7

## 2022-10-20 NOTE — PROGRESS NOTES
Assessment/Plan:    Diabetes mellitus type 2    Lab Results   Component Value Date    HGBA1C 7 2 (A) 10/19/2022   I have counselled the pt to follow a healthy and balanced diet ,and recommend routine exercise  Suboptimal controlled will increase metformin to 1000 mg b i d  I will be ordering diabetic laboratories including comprehensive metabolic panel, hemoglobin A1c, urine microalbumin, lipid panel  Target A1c under 7    Essential hypertension  Hypertension - controlled, I have counseled patient following healthy balance diet, I would like the patient reduce sodium, exercise routinely, I would like the patient continued the med current medical regiment and we will continue to monitor  Overweight (BMI 25 0-29  9)  I have counselled the pt to follow a healthy and balanced diet ,and recommend routine exercise  Dyslipidemia  Hyperlipidemia suboptimal control patient is diabetic in the past patient tolerated Lipitor without side effects, will start Lipitor 10 mg once daily reviewed risks benefits and side effects check CMP and also lipid profile in 4-6 weeks optimal LDL under 100    Increased prostate specific antigen (PSA) velocity  Today we did have a long conversation about the elevation of the PSA I will refer him to Urology for further evaluation I suspect he will likely need MRI of the prostate in further evaluation will defer to Urology    Chronic obstructive pulmonary disease, unspecified COPD type ([facility] [address] )  Clinically stable and doing well continue the current medical regiment will continue monitor  Encourage smoking cessation         Problem List Items Addressed This Visit        Endocrine    Diabetes 1 5, managed as type 2 ([address] )    Relevant Medications    metFORMIN (GLUCOPHAGE) 1000 MG tablet    Diabetes mellitus type 2       Lab Results   Component Value Date    HGBA1C 7 2 (A) 10/19/2022   I have counselled the pt to follow a healthy and balanced diet ,and recommend routine exercise    Suboptimal controlled will increase metformin to 1000 mg b i d  I will be ordering diabetic laboratories including comprehensive metabolic panel, hemoglobin A1c, urine microalbumin, lipid panel  Target A1c under 7         Relevant Medications    metFORMIN (GLUCOPHAGE) 1000 MG tablet    Other Relevant Orders    POCT hemoglobin A1c (Completed)       Respiratory    Chronic obstructive pulmonary disease, unspecified COPD type ([facility] [address] )     Clinically stable and doing well continue the current medical regiment will continue monitor  Encourage smoking cessation            Cardiovascular and Mediastinum    Essential hypertension     Hypertension - controlled, I have counseled patient following healthy balance diet, I would like the patient reduce sodium, exercise routinely, I would like the patient continued the med current medical regiment and we will continue to monitor  Other    Dyslipidemia - Primary     Hyperlipidemia suboptimal control patient is diabetic in the past patient tolerated Lipitor without side effects, will start Lipitor 10 mg once daily reviewed risks benefits and side effects check CMP and also lipid profile in 4-6 weeks optimal LDL under 100         Relevant Medications    atorvastatin (LIPITOR) 10 mg tablet    Other Relevant Orders    Comprehensive metabolic panel    Lipid Panel with Direct LDL reflex    Overweight (BMI 25 0-29  9)     I have counselled the pt to follow a healthy and balanced diet ,and recommend routine exercise           Increased prostate specific antigen (PSA) velocity     Today we did have a long conversation about the elevation of the PSA I will refer him to Urology for further evaluation I suspect he will likely need MRI of the prostate in further evaluation will defer to Urology         Relevant Orders    Ambulatory Referral to Urology      Other Visit Diagnoses     Encounter for immunization        Need for vaccination        Relevant Orders    influenza vaccine, high-dose, PF 0 7 mL (FLUZONE HIGH-DOSE) (Completed)          RTO in 4 months call if any problems  Subjective:      Patient ID: Sunday Carol is a 73 y o  male  HPI 79-year old male coming in for a follow up visit regarding type 2 diabetes, hyperlipidemia, hypertension, overweight and COPD; The patient reports me compliant taking medications without untoward side effects the  The patient is here to review his medical condition, update me on the medical condition and the patient reports me no hospitalizations and no ER visits  He is here to review laboratories in detail there is an elevation of the PSA on today's laboratories he denies any dysuria, he does report me urination several times at nighttime but he drinks a lot prior to bedtime he is plane to make some adjustments no hematuria no slough stream take no incomplete evacuation of the bladder  Reports me that his diet could be better he is very active currently with his hunting  He does tolerate the metformin  The following portions of the patient's history were reviewed and updated as appropriate: allergies, current medications, past family history, past medical history, past social history, past surgical history and problem list     Review of Systems   Constitutional: Negative for activity change, appetite change and unexpected weight change  HENT: Negative for congestion and postnasal drip  Eyes: Negative for visual disturbance  Respiratory: Negative for cough and shortness of breath  Cardiovascular: Negative for chest pain  Gastrointestinal: Negative for abdominal pain, diarrhea, nausea and vomiting  Neurological: Negative for dizziness, light-headedness and headaches  Hematological: Negative for adenopathy  Objective:    Return in about 6 months (around 4/19/2023)  No results found  Allergies   Allergen Reactions   • Penicillins      Other reaction(s): Other (See Comments)  Unknown  Other reaction(s):  Other (See Comments)  Unknown  Other reaction(s): Unknown  Category: Allergy; Past Medical History:   Diagnosis Date   • Adenocarcinoma of prostate [facility])     [id number]  LAST ASSESSED   • Diabetes mellitus ([facility] [address] )    • Hypertension      Past Surgical History:   Procedure Laterality Date   • COLON SURGERY     • HERNIA REPAIR     • SHOULDER SURGERY     • TONSILLECTOMY     • TONSILLECTOMY AND ADENOIDECTOMY       Current Outpatient Medications on File Prior to Visit   Medication Sig Dispense Refill   • amLODIPine (NORVASC) 10 mg tablet TAKE 1 TABLET BY MOUTH EVERY DAY 90 tablet 1   • budesonide (RINOCORT AQUA) 32 MCG/ACT nasal spray 1 spray into each nostril daily     • Cholecalciferol (VITAMIN D3) 1000 units CAPS Take 1 capsule by mouth daily     • fenofibrate (TRICOR) 145 mg tablet TAKE 1 TABLET BY MOUTH EVERY DAY 30 tablet 5   • hydrochlorothiazide (MICROZIDE) 12 5 mg capsule TAKE 1 CAPSULE BY MOUTH EVERY DAY 90 capsule 1   • ibuprofen (MOTRIN) 800 mg tablet Take 800 mg by mouth every 8 (eight) hours as needed  0   • lisinopril (ZESTRIL) 30 mg tablet TAKE 1 TABLET BY MOUTH EVERY DAY 90 tablet 1   • Multiple Vitamin (MULTIVITAMIN) tablet Take 1 tablet by mouth daily     • VITAMIN B COMPLEX-C CAPS Take 1 capsule by mouth daily     • albuterol (PROVENTIL HFA,VENTOLIN HFA) 90 mcg/act inhaler Inhale 2 puffs every 6 (six) hours as needed for wheezing (Patient not taking: Reported on 10/19/2022) 1 Inhaler 1   • budesonide (RINOCORT AQUA) 32 MCG/ACT nasal spray 1 spray into each nostril daily (Patient not taking: Reported on 10/19/2022) 1 Bottle 4   • cyanocobalamin (VITAMIN B-12) 1,000 mcg tablet Take by mouth daily (Patient not taking: Reported on 10/19/2022)     • Omega-3 Fatty Acids (FISH OIL) 1,000 mg Take 3 capsules by mouth daily (Patient not taking: Reported on 10/19/2022)       No current facility-administered medications on file prior to visit       Family History   Problem Relation Age of Onset   • Diabetes Father MELLITUS     Social History     Socioeconomic History   • Marital status: /Civil Union     Spouse name: Not on file   • Number of children: Not on file   • Years of education: Not on file   • Highest education level: Not on file   Occupational History   • Not on file   Tobacco Use   • Smoking status: Current Every Day Smoker     Packs/day: 1 00     Types: Cigarettes   • Smokeless tobacco: Never Used   Vaping Use   • Vaping Use: Never used   Substance and Sexual Activity   • Alcohol use: Not Currently   • Drug use: No   • Sexual activity: Yes   Other Topics Concern   • Not on file   Social History Narrative    COPY OF ADVANCE DIRECTIVE OBTAINED     Social Determinants of Health     Financial Resource Strain: Not on file   Food Insecurity: Not on file   Transportation Needs: Not on file   Physical Activity: Not on file   Stress: Not on file   Social Connections: Not on file   Intimate Partner Violence: Not on file   Housing Stability: Not on file     Vitals:    10/19/22 1729   BP: 138/74   Pulse: 85   SpO2: 95%   Weight: 84 6 kg (186 lb 6 4 oz)   Height: 5' 9" (1 753 m)     Results for orders placed or performed in visit on 10/19/22   POCT hemoglobin A1c   Result Value Ref Range    Hemoglobin A1C 7 2 (A) 6 5     Weight (last 2 days)     Date/Time Weight    10/19/22 1729 84 6 (186 4)        Body mass index is 27 53 kg/m²  BP      Temp      Pulse     Resp      SpO2        Vitals:    10/19/22 1729   Weight: 84 6 kg (186 lb 6 4 oz)     Vitals:    10/19/22 1729   Weight: 84 6 kg (186 lb 6 4 oz)       /74   Pulse 85   Ht 5' 9" (1 753 m)   Wt 84 6 kg (186 lb 6 4 oz)   SpO2 95%   BMI 27 53 kg/m²          Physical Exam  Vitals and nursing note reviewed  Constitutional:       General: He is not in acute distress  Appearance: Normal appearance  He is well-developed and normal weight  He is not diaphoretic  HENT:      Head: Normocephalic and atraumatic        Right Ear: External ear normal       Left Ear: External ear normal    Eyes:      General: No scleral icterus  Right eye: No discharge  Left eye: No discharge  Conjunctiva/sclera: Conjunctivae normal       Pupils: Pupils are equal, round, and reactive to light  Cardiovascular:      Rate and Rhythm: Normal rate and regular rhythm  Heart sounds: Normal heart sounds  No murmur heard  No friction rub  No gallop  Pulmonary:      Effort: No respiratory distress  Breath sounds: No wheezing or rales  Abdominal:      General: Bowel sounds are normal  There is no distension  Palpations: Abdomen is soft  There is no mass  Tenderness: There is no abdominal tenderness  There is no guarding or rebound  Musculoskeletal:         General: No deformity  Cervical back: Neck supple  Lymphadenopathy:      Cervical: No cervical adenopathy  Neurological:      Mental Status: He is alert

## 2022-10-20 NOTE — ASSESSMENT & PLAN NOTE
Hyperlipidemia suboptimal control patient is diabetic in the past patient tolerated Lipitor without side effects, will start Lipitor 10 mg once daily reviewed risks benefits and side effects check CMP and also lipid profile in 4-6 weeks optimal LDL under 100

## 2022-10-24 ENCOUNTER — TELEPHONE (OUTPATIENT)
Dept: INTERNAL MEDICINE CLINIC | Facility: CLINIC | Age: 67
End: 2022-10-24

## 2022-10-24 DIAGNOSIS — N28.9 KIDNEY LESION: Primary | ICD-10-CM

## 2022-10-24 NOTE — TELEPHONE ENCOUNTER
Pt's order for [facility] has , if you would still like pt to have the US done, please enter new order

## 2022-10-25 ENCOUNTER — OFFICE VISIT (OUTPATIENT)
Dept: UROLOGY | Facility: CLINIC | Age: 67
End: 2022-10-25
Payer: MEDICARE

## 2022-10-25 ENCOUNTER — TELEPHONE (OUTPATIENT)
Dept: INTERNAL MEDICINE CLINIC | Facility: CLINIC | Age: 67
End: 2022-10-25

## 2022-10-25 VITALS
BODY MASS INDEX: 27.55 KG/M2 | DIASTOLIC BLOOD PRESSURE: 74 MMHG | WEIGHT: 186 LBS | SYSTOLIC BLOOD PRESSURE: 132 MMHG | HEIGHT: 69 IN

## 2022-10-25 DIAGNOSIS — R97.20 ELEVATED PSA: Primary | ICD-10-CM

## 2022-10-25 PROCEDURE — 99203 OFFICE O/P NEW LOW 30 MIN: CPT | Performed by: PHYSICIAN ASSISTANT

## 2022-10-25 NOTE — TELEPHONE ENCOUNTER
Pt states he received a vaccine 10/19 and since them his arm feels sore, feels tight when he lifts his arm - is this a normal reaction?  Please advise, ty

## 2022-10-25 NOTE — PROGRESS NOTES
UROLOGY CONSULTATION NOTE     Patient Identifiers: Amie Fuentes (MRN: [id number])  Service Requesting Consultation: Bob Powell DO  Service Providing Consultation:  [facility]  Consults  Date of Service: 10/25/2022    Reason for Consultation:  Elevated PSA    History of Present Illness:     Amie Fuentes is a 73 y o  male who saw Dr Christina Lynch several years ago for cherry angiomas  He is no other prior urologic history and is referred for evaluation of elevated PSA  He denies any voiding complaints  His father did have prostate cancer  Current PSA is 4 7  His PSA last year was 3 4  In 2013 his PSA was 1 93  He states he is unable to have a MRI and would need general anesthesia      Past Medical, Past Surgical History:     Past Medical History:   Diagnosis Date   • Adenocarcinoma of prostate ([facility] )     [id number]  LAST ASSESSED   • Diabetes mellitus ([facility] )    • Hypertension    :    Past Surgical History:   Procedure Laterality Date   • COLON SURGERY     • HERNIA REPAIR     • SHOULDER SURGERY     • TONSILLECTOMY     • TONSILLECTOMY AND ADENOIDECTOMY     :    Medications, Allergies:     Current Outpatient Medications:   •  amLODIPine (NORVASC) 10 mg tablet, TAKE 1 TABLET BY MOUTH EVERY DAY, Disp: 90 tablet, Rfl: 1  •  atorvastatin (LIPITOR) 10 mg tablet, Take 1 tablet (10 mg total) by mouth daily, Disp: 30 tablet, Rfl: 3  •  budesonide (RINOCORT AQUA) 32 MCG/ACT nasal spray, 1 spray into each nostril daily, Disp: , Rfl:   •  Cholecalciferol (VITAMIN D3) 1000 units CAPS, Take 1 capsule by mouth daily, Disp: , Rfl:   •  fenofibrate (TRICOR) 145 mg tablet, TAKE 1 TABLET BY MOUTH EVERY DAY, Disp: 30 tablet, Rfl: 5  •  hydrochlorothiazide (MICROZIDE) 12 5 mg capsule, TAKE 1 CAPSULE BY MOUTH EVERY DAY, Disp: 90 capsule, Rfl: 1  •  ibuprofen (MOTRIN) 800 mg tablet, Take 800 mg by mouth every 8 (eight) hours as needed, Disp: , Rfl: 0  •  lisinopril (ZESTRIL) 30 mg tablet, TAKE 1 TABLET BY MOUTH EVERY DAY, Disp: 90 tablet, Rfl: 1  •  metFORMIN (GLUCOPHAGE) 1000 MG tablet, Take 1 tablet (1,000 mg total) by mouth 2 (two) times a day with meals, Disp: 180 tablet, Rfl: 3  •  Multiple Vitamin (MULTIVITAMIN) tablet, Take 1 tablet by mouth daily, Disp: , Rfl:   •  VITAMIN B COMPLEX-C CAPS, Take 1 capsule by mouth daily, Disp: , Rfl:   •  albuterol (PROVENTIL HFA,VENTOLIN HFA) 90 mcg/act inhaler, Inhale 2 puffs every 6 (six) hours as needed for wheezing (Patient not taking: No sig reported), Disp: 1 Inhaler, Rfl: 1  •  budesonide (RINOCORT AQUA) 32 MCG/ACT nasal spray, 1 spray into each nostril daily (Patient not taking: No sig reported), Disp: 1 Bottle, Rfl: 4  •  cyanocobalamin (VITAMIN B-12) 1,000 mcg tablet, Take by mouth daily (Patient not taking: No sig reported), Disp: , Rfl:   •  Omega-3 Fatty Acids (FISH OIL) 1,000 mg, Take 3 capsules by mouth daily (Patient not taking: No sig reported), Disp: , Rfl:     Allergies: Allergies   Allergen Reactions   • Penicillins      Other reaction(s): Other (See Comments)  Unknown  Other reaction(s): Other (See Comments)  Unknown  Other reaction(s): Unknown  Category: Allergy;    :    Social and Family History:   Social History:   Social History     Tobacco Use   • Smoking status: Current Every Day Smoker     Packs/day: 1 00     Types: Cigarettes   • Smokeless tobacco: Never Used   Vaping Use   • Vaping Use: Never used   Substance Use Topics   • Alcohol use: Not Currently   • Drug use: No        Social History     Tobacco Use   Smoking Status Current Every Day Smoker   • Packs/day: 1 00   • Types: Cigarettes   Smokeless Tobacco Never Used       Family History:  Family History   Problem Relation Age of Onset   • Diabetes Father         MELLITUS   :     Review of Systems:     General: Fever, chills, or night sweats: negative  Cardiac: Negative for chest pain  Pulmonary: Negative for shortness of breath  Gastrointestinal: Abdominal pain negative    Nausea, vomiting, or diarrhea negative,  Genitourinary: See HPI above  Patient does not have hematuria  All other systems queried were negative  Physical Exam:   General: Patient is pleasant and in NAD  Awake and alert  /74 (BP Location: Left arm, Patient Position: Sitting, Cuff Size: Adult)   Ht 5' 9" (1 753 m)   Wt 84 4 kg (186 lb)   BMI 27 47 kg/m²   HEENT:  Conjunctiva are clear  Constitutional:  pleasant and cooperative     no apparent distress  Cardiac: Peripheral edema: negative  Pulmonary: Non-labored breathing  Abdomen: Soft, non-tender, non-distended  No surgical scars  No masses, tenderness, hernias noted  Genitourinary: Negative CVA tenderness, negative suprapubic tenderness  Extremities:  Moves all extremities  Neurological:CNII-XII intact  No numbness or tingling  Essentially non focal neurologic exam  Psychiatric:mood affect and behavior normal    (Male):  Refused prostate exam!    Labs:     Lab Results   Component Value Date    HGB 16 4 03/22/2021    HCT 49 6 (H) 03/22/2021    WBC 12 32 (H) 03/22/2021     03/22/2021   ]    Lab Results   Component Value Date    K 4 2 10/10/2022     (H) 10/10/2022    CO2 22 10/10/2022    BUN 20 10/10/2022    CREATININE 0 97 10/10/2022    CALCIUM 10 0 10/10/2022   ]    Imaging:   I personally reviewed the images and report of the following studies, and reviewed them with the patient:  None  ASSESSMENT:     1  Elevated PSA    PLAN:   -I reviewed the findings with the patient and his wife  -I did recommend a prostate exam which he declined  -we discussed the multiparametric MRI but he insists on general anesthesia  -I recommend follow-up in 4 months with PSA prior to visit  -should his PSA be elevated I would arrange for a standard prostate biopsy with anesthesia in the [address] [place] [address]      Thank you for allowing me to participate in this patients’ care  Please do not hesitate to call with any additional questions    Dave Chu

## 2022-11-01 ENCOUNTER — TELEPHONE (OUTPATIENT)
Dept: INTERNAL MEDICINE CLINIC | Facility: CLINIC | Age: 67
End: 2022-11-01

## 2022-11-01 NOTE — TELEPHONE ENCOUNTER
The patient would like to know which blood pressure medication he should be taking  He was taking lisinopril  You recently order amlodipine  Please advise   Thank you

## 2022-11-14 DIAGNOSIS — I10 ESSENTIAL HYPERTENSION: ICD-10-CM

## 2022-11-14 DIAGNOSIS — E13.9 DIABETES 1.5, MANAGED AS TYPE 2 (HCC): ICD-10-CM

## 2022-11-14 RX ORDER — LISINOPRIL 30 MG/1
30 TABLET ORAL DAILY
Qty: 90 TABLET | Refills: 1 | Status: SHIPPED | OUTPATIENT
Start: 2022-11-14

## 2022-11-16 ENCOUNTER — APPOINTMENT (OUTPATIENT)
Dept: LAB | Facility: IMAGING CENTER | Age: 67
End: 2022-11-16

## 2022-11-16 DIAGNOSIS — E78.5 DYSLIPIDEMIA: ICD-10-CM

## 2022-11-16 LAB
ALBUMIN SERPL BCP-MCNC: 3.8 G/DL (ref 3.5–5)
ALP SERPL-CCNC: 81 U/L (ref 46–116)
ALT SERPL W P-5'-P-CCNC: 27 U/L (ref 12–78)
ANION GAP SERPL CALCULATED.3IONS-SCNC: 7 MMOL/L (ref 4–13)
AST SERPL W P-5'-P-CCNC: 12 U/L (ref 5–45)
BILIRUB SERPL-MCNC: 0.67 MG/DL (ref 0.2–1)
BUN SERPL-MCNC: 17 MG/DL (ref 5–25)
CALCIUM SERPL-MCNC: 10.1 MG/DL (ref 8.3–10.1)
CHLORIDE SERPL-SCNC: 110 MMOL/L (ref 96–108)
CHOLEST SERPL-MCNC: 128 MG/DL
CO2 SERPL-SCNC: 21 MMOL/L (ref 21–32)
CREAT SERPL-MCNC: 0.92 MG/DL (ref 0.6–1.3)
GFR SERPL CREATININE-BSD FRML MDRD: 86 ML/MIN/1.73SQ M
GLUCOSE P FAST SERPL-MCNC: 105 MG/DL (ref 65–99)
HDLC SERPL-MCNC: 36 MG/DL
LDLC SERPL CALC-MCNC: 57 MG/DL (ref 0–100)
POTASSIUM SERPL-SCNC: 4.1 MMOL/L (ref 3.5–5.3)
PROT SERPL-MCNC: 7.9 G/DL (ref 6.4–8.4)
SODIUM SERPL-SCNC: 138 MMOL/L (ref 135–147)
TRIGL SERPL-MCNC: 177 MG/DL

## 2022-11-23 DIAGNOSIS — I10 ESSENTIAL HYPERTENSION: ICD-10-CM

## 2022-11-23 RX ORDER — HYDROCHLOROTHIAZIDE 12.5 MG/1
12.5 CAPSULE, GELATIN COATED ORAL DAILY
Qty: 90 CAPSULE | Refills: 1 | Status: SHIPPED | OUTPATIENT
Start: 2022-11-23

## 2022-11-23 NOTE — TELEPHONE ENCOUNTER
Patient needs a refill of hydrochlorothiazide  He also asked if he needs to take both the lisinopril and amlodipine  He would like to take one pill instead of two  Please order and advise on the other question    Thank You

## 2022-11-23 NOTE — TELEPHONE ENCOUNTER
Pt calling in also asking why he's taking fenofibrate and atorvastatin and why it can't just be 1 pill

## 2023-02-06 NOTE — PROGRESS NOTES
Assessment  1  Acute sinusitis (461 9) (J01 90)   2  Blurry vision (368 8) (H53 8)   3  Diabetes mellitus, type 2 (250 00) (E11 9)   4  Hyperlipidemia (272 4) (E78 5)   5  Hypertension (401 9) (I10)    Assessment plan 1  Acute sinus infection the patient declines Z-Ovi will treat with doxycycline 100 mg 1 tablet b i d  times 10 days, Flonase 2 sprays each nostril once a day plenty of fluids and rest if the symptoms not improved please notify me 2  Blurry vision for several weeks examination does have a cataract will have the patient see Ophthalmology Dr Jose Morales 3  Type 2 diabetes I did review his laboratories recommend healthy imbalance diet the patient does not want start medication at this point time I will be ordering a comprehensive metabolic panel, hemoglobin A1c, lipid panel and urine microalbumin 4  Hyperlipidemia recommend a low-cholesterol diet will check a lipid profile 5  Hypertension controlled continue with current medical regimen number sign 6 muscle tightness of the cervical muscles secondary to cough I a cervical strain patient declines a workup he will monitor his symptoms if they worsen he will notify me number sign 7 panic attacks/anxiety no suicidal ideation the patient declines treatment he declines medication and counseling his symptoms are mild he reports me he will monitor his symptoms and if they worsen he will notify me RTO 6 months call if any problems  Plan  Acute sinusitis    · Doxycycline Hyclate 100 MG Oral Tablet; TAKE 1 TABLET TWICE A DAY  UNTIL  GONE  Blurry vision    · Lazaro ALCANTAR, Isma Anderson (Ophthalmology) Co-Management  *  Status: Hold For - Scheduling   Requested for: 51QQJ4683  Care Summary provided  : Yes  Diabetes mellitus, type 2    · (1) COMPREHENSIVE METABOLIC PANEL; Status:Active; Requested for:66Zlx9278;    · (1) HEMOGLOBIN A1C; Status:Active; Requested for:66Jju4533;    · (1) LIPID PANEL, FASTING; Status:Active;  Requested for:01Hyx0428;    · (1) MICROALBUMIN Mariely Lepee Easley  81 y.o.  female  Chief Complaint   Patient presents with    Shortness of Breath     Pt seen recently in ED & admitted from CHF & pneumonia. Pt feels shortness of breath is worsening, cannot make it to bathroom. Ongoing x weeks. Saw cardiologist on Tuesday, given another diuretic & ordered additional labs re:anemia, per pt.      ED RN SOB protocol ordered. Patient educated on triage process, to alert staff if any changes in condition.     CREATININE RATIO, RANDOM URINE; Status:Active; Requested  for:77Rlk3222;     Chief Complaint  The patient presents today with headache congestion, cough and post nasal drip for past week  History of Present Illness  HPI: 52-year-old male who is coming in for follow-up examination type 2 diabetes, hyperlipidemia, hypertension, review of laboratories he has 2 new acute problems left eye blurry vision for several weeks and also acute sinus infection; the patient reports approximately 2 weeks ago he 1st started will coughing constantly and he also reports pressure in pain in the sinuses coughing he notices a pain in the frontal sinus he reports me that the cough secondary to a tickle in the throat reports me initially elevated but color at this point time it is a clear  No fever no chills no ear pain no sore throat the 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 job he  Using a hook and the Sudafed PE, and and also  does mention to me about left eye blurry vision 2-3 weeks ago as a at this point he does not want treatment also wanted further orders he does not report any eye pain med  He reports me because of financial constraints he cannot go to eye doctor at this point but he will let me know when he is ready    also reports me muscle strain of the neck muscles secondary to coughing several weeks ago he does not want evaluation    mentions me 3 episodes of mild panic attacks/anxiety currently no depression, no suicidal ideation he is currently asymptomatic he reports me that have occurred in the context of going for a medical procedure      Review of Systems    Constitutional: no fever,-- no chills-- and-- not feeling tired  ENT: nasal discharge, but-- no earache-- and-- no sore throat  Respiratory: cough, but-- no shortness of breath,-- no wheezing-- and-- no shortness of breath during exertion  ROS reviewed  Active Problems  1   Abnormal blood sugar (790 29) (R73 09)   2  Acute bronchitis (466 0) (J20 9)   3  Denied: History of Adenocarcinoma Of The Prostate Gland   4  Allergic rhinitis (477 9) (J30 9)   5  Copy of advanced directive obtained (V49 89) (Z78 9)   6  Diabetes mellitus, type 2 (250 00) (E11 9)   7  Encounter for screening colonoscopy (V76 51) (Z12 11)   8  Exposure to pertussis (V01 89) (Z20 818)   9  Focal Nodular Hyperplasia Of The Liver (751 69)   10  Hematuria (599 70) (R31 9)   11  Hyperlipidemia (272 4) (E78 5)   12  Hypertension (401 9) (I10)   13  Laceration Of Hand (882 0)   14  Localized osteoarthritis of spine (721 90) (M47 819)   15  Need for prophylactic vaccination and inoculation against influenza (V04 81) (Z23)   16  Penis disorder (607 9) (N48 9)   17  Sinus infection (473 9) (J32 9)   18  Special screening examination for neoplasm of prostate (V76 44) (Z12 5)   19  Tick bites (919 4,E906 4) [facility])    Surgical History  Surgical History Reviewed: The surgical history was reviewed and updated today  Social History   · Being A Social Drinker   · Copy of advanced directive obtained (V49 89) (Z78 9)   · Current Every Day Smoker (305 1)   · Denied: History of Drug Use   · Marital History - Currently   The social history was reviewed and updated today  The social history was reviewed and is unchanged  Family History  Family History Reviewed: The family history was reviewed and updated today  Current Meds   1  AmLODIPine Besylate 10 MG Oral Tablet; take 1/2 tablet by mouth twice daily; Therapy: 72TKL1820 to (Evaluate:93Llh0704)  Requested for: 87BCF6944; Last   Rx:44Nml4222 Ordered   2  Aspirin 81 MG TABS; TAKE 1 TABLET DAILY; Therapy: (Recorded:21Apr2015) to Recorded   3  Fenofibrate 145 MG Oral Tablet; take 1 tablet by mouth daily as directed; Therapy: 14NWB1172 to (Evaluate:78Vlc9631)  Requested for: 76MDT8069; Last   Rx:06Jun2017 Ordered   4   Fish Oil 1000 MG Oral Capsule; TAKE 3 CAPSULE Daily; Therapy: (Recorded:21Apr2015) to Recorded   5  HydroCHLOROthiazide 12 5 MG Oral Capsule; take 1 capsule daily; Therapy: 30Apr2012 to (Evaluate:25Osb0188)  Requested for: 70SXK8667; Last   Rx:31Mar2017 Ordered   6  Lisinopril 30 MG Oral Tablet; TAKE 1 TABLET DAILY; Therapy: 32YOZ6950 to (Evaluate:18Mar2018)  Requested for: 67Umo2798; Last   Rx:47Fiv8060 Ordered   7  One-Daily Multi Vitamins TABS; TAKE 1 TABLET DAILY; Therapy: (Recorded:21Apr2015) to Recorded   8  Rhinocort Allergy 32 MCG/ACT Nasal Suspension; USE 1 SPRAY IN EACH NOSTRIL   ONCE DAILY; Therapy: 37GJL2808 to (Last Rx:09Jun2017) Ordered   9  Vitamin B Complex CAPS; TAKE 1 CAPSULE DAILY; Therapy: (Recorded:21Apr2015) to Recorded   10  Vitamin D 1000 UNIT CAPS; One daily gluten free; Therapy: 21Apr2015 to (Evaluate:18Oct2015) Recorded    The medication list was reviewed and updated today  Allergies  1  Penicillins  2  No Known Environmental Allergies   3  No Known Food Allergies    Vitals   Recorded: 21LTQ1715 09:53AM   Temperature 98 6 F, Oral   Heart Rate 83   Respiration 18   Systolic 033, Sitting   Diastolic 80, Sitting   Weight 186 lb    BMI Calculated 27 87   BSA Calculated 1 99   O2 Saturation 96     Physical Exam    Constitutional   General appearance: No acute distress, well appearing and well nourished  Eyes   Conjunctiva and lids: No swelling, erythema, or discharge  Pupils and irises: Abnormal   Cornea, Lens, and Sclera: Right eye: cataract  Left eye: cataract  Ears, Nose, Mouth, and Throat   External inspection of ears and nose: Normal     Otoscopic examination: Tympanic membrance translucent with normal light reflex  Canals patent without erythema  Nasal mucosa, septum, and turbinates: Abnormal   There was clear rhinorrhea from both nares  Oropharynx: Abnormal  -- (Postnasal drip) The posterior pharynx was erythematous, but-- did not have an exudate     Pulmonary   Respiratory effort: No increased work of breathing or signs of respiratory distress  Auscultation of lungs: Clear to auscultation, equal breath sounds bilaterally, no wheezes, no rales, no rhonci  Cardiovascular   Auscultation of heart: Normal rate and rhythm, normal S1 and S2, without murmurs  Examination of extremities for edema and/or varicosities: Normal     Abdomen   Abdomen: Non-tender, no masses  Liver and spleen: No hepatomegaly or splenomegaly  Lymphatic   Palpation of lymph nodes in neck: No lymphadenopathy  Psychiatric   Mood and affect: Normal     Additional Exam:  Hypertonicity of the paravertebral musculature bilateral cervical spine  Results/Data  Diabetes Flow Sheet 33WYI8173 08:45AM      Test Name Result Flag Reference   HEMOGLOBIN A1C 6 8     GLUCOSE FASTING 97       (1) LIPID PANEL, FASTING 42YYU3323 08:40AM [facility] ([facility])     Test Name Result Flag Reference   CHOLESTEROL 188     HDL,DIRECT 35     LDL CHOLESTEROL CALCULATED 114     TRIGLYCERIDES 197       Summary / No summary entered :      No summary entered  Documents attached :      [address] [facility] Work - Rosanne Gross; Reynaldo Najjar: [id number] - Image Encounter - Melinda Christiansen - (Internal      Medicine) (Additional Information Document)  (1) HEMOGLOBIN A1C 38VNY3535 08:36AM [facility]     Test Name Result Flag Reference   HEMOGLOBIN A1C 6 8     EST  AVG   GLUCOSE 97       Summary / No summary entered :      No summary entered  Documents attached :      [address] [facility] Work - Rosanne Gross; Reynaldo Najjar: [id number] - Image Encounter - Melinda Christiansen - (Internal      Medicine) (Additional Information Document)    Signatures   Electronically signed by : Josefa Madsen DO; Nov 7 2017 10:41AM EST                       (Author)

## 2023-02-21 DIAGNOSIS — E78.5 DYSLIPIDEMIA: ICD-10-CM

## 2023-02-21 DIAGNOSIS — E78.1 HYPERTRIGLYCERIDEMIA: ICD-10-CM

## 2023-02-21 RX ORDER — FENOFIBRATE 145 MG/1
TABLET, COATED ORAL
Qty: 30 TABLET | Refills: 5 | Status: SHIPPED | OUTPATIENT
Start: 2023-02-21

## 2023-02-21 RX ORDER — ATORVASTATIN CALCIUM 10 MG/1
TABLET, FILM COATED ORAL
Qty: 30 TABLET | Refills: 3 | Status: SHIPPED | OUTPATIENT
Start: 2023-02-21

## 2023-02-26 ENCOUNTER — OFFICE VISIT (OUTPATIENT)
Dept: URGENT CARE | Age: 68
End: 2023-02-26

## 2023-02-26 VITALS
TEMPERATURE: 100 F | SYSTOLIC BLOOD PRESSURE: 186 MMHG | OXYGEN SATURATION: 94 % | RESPIRATION RATE: 24 BRPM | DIASTOLIC BLOOD PRESSURE: 84 MMHG | HEART RATE: 117 BPM

## 2023-02-26 DIAGNOSIS — J06.9 UPPER RESPIRATORY TRACT INFECTION, UNSPECIFIED TYPE: Primary | ICD-10-CM

## 2023-02-26 RX ORDER — PREDNISONE 20 MG/1
20 TABLET ORAL 2 TIMES DAILY WITH MEALS
Qty: 10 TABLET | Refills: 0 | Status: SHIPPED | OUTPATIENT
Start: 2023-02-26 | End: 2023-03-03

## 2023-02-26 RX ORDER — AZITHROMYCIN 250 MG/1
TABLET, FILM COATED ORAL
Qty: 6 TABLET | Refills: 0 | Status: SHIPPED | OUTPATIENT
Start: 2023-02-26 | End: 2023-03-02

## 2023-02-26 RX ORDER — BENZONATATE 200 MG/1
200 CAPSULE ORAL 3 TIMES DAILY PRN
Qty: 20 CAPSULE | Refills: 0 | Status: SHIPPED | OUTPATIENT
Start: 2023-02-26

## 2023-02-26 NOTE — PROGRESS NOTES
[facility]        NAME: Lynne Mancia is a 78 y o  male  : 1955    MRN: [id number]  DATE: 2023  TIME: 2:40 PM    Assessment and Plan   Upper respiratory tract infection, unspecified type [J06 9]  1  Upper respiratory tract infection, unspecified type  Covid/Flu-Office Collect    azithromycin (ZITHROMAX) 250 mg tablet    predniSONE 20 mg tablet    benzonatate (TESSALON) 200 MG capsule      COVID/flu cultures pending, [facility] 24 to 48 hours  Please begin course of azithromycin  as prescribed  Please begin short course of prednisone twice daily x5 days  Check blood glucose at least twice daily while on prednisone as it may cause a transient increase in blood sugar  (Last Hba1c in 2022 = 7 2)  Please begin Tessalon Perles 3 times daily as needed for cough  May continue over-the-counter decongestant such as Robitussin  Blood pressure safe over-the-counter decongestants include the Coricidin brand  If your symptoms worsen or if you experience chest pain, palpitations or lightheadedness, report to emergency department for further evaluation  Follow-up with primary care provider if symptoms do not resolve within 1 week  Patient Instructions   Upper Respiratory Infection   WHAT YOU NEED TO KNOW:   An upper respiratory infection is also called a cold  It can affect your nose, throat, ears, and sinuses  Cold symptoms are usually worst for the first 3 to 5 days  Most people get better in 7 to 14 days  You may continue to cough for 2 to 3 weeks  Colds are caused by viruses and do not get better with antibiotics  DISCHARGE INSTRUCTIONS:   Call your local emergency number (911 in the [address] [place] [address],[address]) if:   • You have chest pain or trouble breathing         Return to the emergency department if:   • You have a fever over 102ºF (39ºC)          Call your doctor if:   • You have a low fever      • Your sore throat gets worse or you see white or yellow spots in your throat      • Your symptoms get worse after 3 to 5 days or are not better in 14 days      • You have a rash anywhere on your skin      • You have large, tender lumps in your neck      • You have thick, green, or yellow drainage from your nose      • You cough up thick yellow, green, or bloody mucus      • You have a bad earache      • You have questions or concerns about your condition or care      Medicines: You may need any of the following:  • Decongestants  help reduce nasal congestion and help you breathe more easily  If you take decongestant pills, they may make you feel restless or cause problems with your sleep  Do not use decongestant sprays for more than a few days      • Cough suppressants  help reduce coughing  Ask your healthcare provider which type of cough medicine is best for you       • NSAIDs , such as ibuprofen, help decrease swelling, pain, and fever  NSAIDs can cause stomach bleeding or kidney problems in certain people  If you take blood thinner medicine, always ask your healthcare provider if NSAIDs are safe for you  Always read the medicine label and follow directions      • Acetaminophen  decreases pain and fever  It is available without a doctor's order  Ask how much to take and how often to take it  Follow directions  Read the labels of all other medicines you are using to see if they also contain acetaminophen, or ask your doctor or pharmacist  Acetaminophen can cause liver damage if not taken correctly      • Take your medicine as directed  Contact your healthcare provider if you think your medicine is not helping or if you have side effects  Tell your provider if you are allergic to any medicine  Keep a list of the medicines, vitamins, and herbs you take  Include the amounts, and when and why you take them  Bring the list or the pill bottles to follow-up visits  Carry your medicine list with you in case of an emergency      Self-care:   • Rest as much as possible  Slowly start to do more each day      • Drink more liquids as directed  Liquids will help thin and loosen mucus so you can cough it up  Liquids will also help prevent dehydration  Liquids that help prevent dehydration include water, fruit juice, and broth  Do not drink liquids that contain caffeine  Caffeine can increase your risk for dehydration  Ask your healthcare provider how much liquid to drink each day      • Soothe a sore throat  Gargle with warm salt water  Make salt water by dissolving ¼ teaspoon salt in 1 cup warm water  You may also suck on hard candy or throat lozenges  You may use a sore throat spray      • Use a humidifier or vaporizer  Use a cool mist humidifier or a vaporizer to increase air moisture in your home  This may make it easier for you to breathe and help decrease your cough      • Use saline nasal drops as directed  These help relieve congestion      • Apply petroleum-based jelly around the outside of your nostrils  This can decrease irritation from blowing your nose      • Do not smoke  Nicotine and other chemicals in cigarettes and cigars can make your symptoms worse  They can also cause infections such as bronchitis or pneumonia  Ask your healthcare provider for information if you currently smoke and need help to quit  E-cigarettes or smokeless tobacco still contain nicotine  Talk to your healthcare provider before you use these products      Prevent a cold:   • Wash your hands often  Use soap and water every time you wash your hands  Rub your soapy hands together, lacing your fingers  Use the fingers of one hand to scrub under the nails of the other hand  Wash for at least 20 seconds  Rinse with warm, running water for several seconds  Then dry your hands  Use hand  gel if soap and water are not available  Do not touch your eyes or mouth without washing your hands first           • Cover a sneeze or cough  Use a tissue that covers your mouth and nose   Put the used tissue in the trash right away  Use the bend of your arm if a tissue is not available  Wash your hands well with soap and water or use a hand   Do not stand close to anyone who is sneezing or coughing      • Try to stay away from others while you are sick  This is especially important during the first 2 to 3 days when the virus is more easily spread  Wait until a fever, cough, or other symptoms are gone before you return to work or other regular activities      • Do not share items while you are sick  This includes food, drinks, eating utensils, and dishes      Follow up with your doctor as directed:  Write down your questions so you remember to ask them during your visits  © Copyright [employer] 2022 Information is for End User's use only and may not be sold, redistributed or otherwise used for commercial purposes  The above information is an  only  It is not intended as medical advice for individual conditions or treatments  Talk to your doctor, nurse or pharmacist before following any medical regimen to see if it is safe and effective for you  Follow up with PCP in 3-5 days  Proceed to  ER if symptoms worsen  Chief Complaint     Chief Complaint   Patient presents with   • Shortness of Breath     Fever, dry cough, chills, diaphoresis, lightheadedness, since Tuesday,          History of Present Illness       Patient is a 79-year-old male with past medical history significant for COPD, hypertension, diabetes mellitus, current pack-a-day smoker, who presents with wife for evaluation of cough and shortness of breath since this past Tuesday  Patient reports occasional thick mucus however the cough is typically dry  He also reports fever with a Tmax of 103  He has been taking Tylenol and staying hydrated  He took an at home COVID test which was negative, however his wife reports that she tested positive for COVID this past Thursday    Patient denies chest pain, palpitations, lightheadedness/dizziness/syncope, abdominal pain, nausea/vomiting/diarrhea  He has been taking Robitussin with little relief  Shortness of Breath  Associated symptoms include coughing  Pertinent negatives include no chest pain, dizziness, fatigue, palpitations, rhinorrhea, sore throat, stridor or wheezing  Review of Systems   Review of Systems   Constitutional: Positive for fever  Negative for chills and fatigue  HENT: Negative for congestion, ear pain, postnasal drip, rhinorrhea, sinus pressure, sinus pain, sneezing and sore throat  Eyes: Negative for pain and visual disturbance  Respiratory: Positive for cough and shortness of breath  Negative for apnea, choking, chest tightness, wheezing and stridor  Cardiovascular: Negative for chest pain and palpitations  Gastrointestinal: Negative for abdominal pain, diarrhea, nausea and vomiting  Endocrine: Negative  Genitourinary: Negative  Negative for dysuria and hematuria  Musculoskeletal: Negative for arthralgias, back pain, myalgias, neck pain and neck stiffness  Skin: Negative for color change and rash  Allergic/Immunologic: Negative  Negative for environmental allergies  Neurological: Negative  Negative for dizziness, seizures, syncope, facial asymmetry, light-headedness, numbness and headaches  Hematological: Negative  Negative for adenopathy  Psychiatric/Behavioral: Negative  All other systems reviewed and are negative          Current Medications       Current Outpatient Medications:   •  amLODIPine (NORVASC) 10 mg tablet, TAKE 1 TABLET BY MOUTH EVERY DAY, Disp: 90 tablet, Rfl: 1  •  atorvastatin (LIPITOR) 10 mg tablet, TAKE 1 TABLET BY MOUTH EVERY DAY, Disp: 30 tablet, Rfl: 3  •  azithromycin (ZITHROMAX) 250 mg tablet, Take 2 tablets today then 1 tablet daily x 4 days, Disp: 6 tablet, Rfl: 0  •  benzonatate (TESSALON) 200 MG capsule, Take 1 capsule (200 mg total) by mouth 3 (three) times a day as needed for cough, Disp: 20 capsule, Rfl: 0  •  budesonide (RINOCORT AQUA) 32 MCG/ACT nasal spray, 1 spray into each nostril daily, Disp: , Rfl:   •  Cholecalciferol (VITAMIN D3) 1000 units CAPS, Take 1 capsule by mouth daily, Disp: , Rfl:   •  fenofibrate (TRICOR) 145 mg tablet, TAKE 1 TABLET BY MOUTH EVERY DAY, Disp: 30 tablet, Rfl: 5  •  hydrochlorothiazide (MICROZIDE) 12 5 mg capsule, Take 1 capsule (12 5 mg total) by mouth daily, Disp: 90 capsule, Rfl: 1  •  ibuprofen (MOTRIN) 800 mg tablet, Take 800 mg by mouth every 8 (eight) hours as needed, Disp: , Rfl: 0  •  lisinopril (ZESTRIL) 30 mg tablet, Take 1 tablet (30 mg total) by mouth daily, Disp: 90 tablet, Rfl: 1  •  metFORMIN (GLUCOPHAGE) 1000 MG tablet, Take 1 tablet (1,000 mg total) by mouth 2 (two) times a day with meals, Disp: 180 tablet, Rfl: 3  •  Multiple Vitamin (MULTIVITAMIN) tablet, Take 1 tablet by mouth daily, Disp: , Rfl:   •  Omega-3 Fatty Acids (FISH OIL) 1,000 mg, Take 3 capsules by mouth daily, Disp: , Rfl:   •  predniSONE 20 mg tablet, Take 1 tablet (20 mg total) by mouth 2 (two) times a day with meals for 5 days, Disp: 10 tablet, Rfl: 0  •  VITAMIN B COMPLEX-C CAPS, Take 1 capsule by mouth daily, Disp: , Rfl:   •  albuterol (PROVENTIL HFA,VENTOLIN HFA) 90 mcg/act inhaler, Inhale 2 puffs every 6 (six) hours as needed for wheezing (Patient not taking: No sig reported), Disp: 1 Inhaler, Rfl: 1  •  budesonide (RINOCORT AQUA) 32 MCG/ACT nasal spray, 1 spray into each nostril daily (Patient not taking: No sig reported), Disp: 1 Bottle, Rfl: 4  •  cyanocobalamin (VITAMIN B-12) 1,000 mcg tablet, Take by mouth daily (Patient not taking: No sig reported), Disp: , Rfl:     Current Allergies     Allergies as of 02/26/2023 - Reviewed 02/26/2023   Allergen Reaction Noted   • Penicillins  08/01/2012            The following portions of the patient's history were reviewed and updated as appropriate: allergies, current medications, past family history, past medical history, past social history, past surgical history and problem list      Past Medical History:   Diagnosis Date   • Adenocarcinoma of prostate ([facility] [facility] )     [id number]  LAST ASSESSED   • Diabetes mellitus ([facility] )    • Hypertension        Past Surgical History:   Procedure Laterality Date   • COLON SURGERY     • HERNIA REPAIR     • SHOULDER SURGERY     • TONSILLECTOMY     • TONSILLECTOMY AND ADENOIDECTOMY         Family History   Problem Relation Age of Onset   • Diabetes Father         MELLITUS         Medications have been verified  Objective   BP (!) 186/84   Pulse (!) 121   Temp 100 °F (37 8 °C)   Resp (!) 26   SpO2 93%        Physical Exam     Physical Exam  Vitals reviewed  Constitutional:       General: He is not in acute distress  Appearance: Normal appearance  He is not ill-appearing, toxic-appearing or diaphoretic  Interventions: He is not intubated  HENT:      Head: Normocephalic and atraumatic  Right Ear: Tympanic membrane, ear canal and external ear normal  There is no impacted cerumen  Left Ear: Tympanic membrane, ear canal and external ear normal  There is no impacted cerumen  Nose: Nose normal  No congestion or rhinorrhea  Mouth/Throat:      Mouth: Mucous membranes are moist       Pharynx: Oropharynx is clear  Uvula midline  No pharyngeal swelling, oropharyngeal exudate, posterior oropharyngeal erythema or uvula swelling  Tonsils: No tonsillar exudate or tonsillar abscesses  1+ on the right  1+ on the left  Eyes:      Extraocular Movements: Extraocular movements intact  Conjunctiva/sclera: Conjunctivae normal       Pupils: Pupils are equal, round, and reactive to light  Neck:      Thyroid: No thyromegaly  Vascular: No hepatojugular reflux  Trachea: No tracheal deviation  Cardiovascular:      Rate and Rhythm: Normal rate and regular rhythm  Pulses: Normal pulses        Heart sounds: Normal heart sounds, S1 normal and S2 normal  Heart sounds not distant  No murmur heard  No friction rub  No gallop  Pulmonary:      Effort: Pulmonary effort is normal  No tachypnea, bradypnea, accessory muscle usage, prolonged expiration, respiratory distress or retractions  He is not intubated  Breath sounds: No stridor, decreased air movement or transmitted upper airway sounds  Examination of the right-upper field reveals decreased breath sounds  Examination of the left-upper field reveals decreased breath sounds  Examination of the right-middle field reveals decreased breath sounds  Examination of the left-middle field reveals decreased breath sounds  Decreased breath sounds present  No wheezing, rhonchi or rales  Chest:      Chest wall: No tenderness  Musculoskeletal:         General: Normal range of motion  Cervical back: Normal range of motion and neck supple  No rigidity or tenderness  Lymphadenopathy:      Cervical: No cervical adenopathy  Skin:     General: Skin is warm and dry  Capillary Refill: Capillary refill takes less than 2 seconds  Findings: No erythema  Neurological:      General: No focal deficit present  Mental Status: He is alert     Psychiatric:         Mood and Affect: Mood normal

## 2023-02-26 NOTE — PATIENT INSTRUCTIONS
COVID/flu cultures pending, results should be available in [facility] within 24 to 48 hours  Please begin course of azithromycin  as prescribed  Please begin short course of prednisone twice daily x5 days  Check blood glucose at least twice daily while on prednisone as it may cause a transient increase in blood sugar  Please begin Tessalon Perles 3 times daily as needed for cough  May continue over-the-counter decongestant such as Robitussin  Blood pressure safe over-the-counter decongestants include the Coricidin brand  If your symptoms worsen or if you experience chest pain, palpitations or lightheadedness, report to emergency department for further evaluation  Follow-up with primary care provider if symptoms do not resolve within 1 week

## 2023-02-27 LAB
FLUAV RNA RESP QL NAA+PROBE: NEGATIVE
FLUBV RNA RESP QL NAA+PROBE: NEGATIVE
SARS-COV-2 RNA RESP QL NAA+PROBE: POSITIVE

## 2023-02-28 ENCOUNTER — TELEPHONE (OUTPATIENT)
Dept: INTERNAL MEDICINE CLINIC | Facility: CLINIC | Age: 68
End: 2023-02-28

## 2023-02-28 DIAGNOSIS — E11.9 TYPE 2 DIABETES MELLITUS WITHOUT COMPLICATION, WITHOUT LONG-TERM CURRENT USE OF INSULIN (HCC): Primary | ICD-10-CM

## 2023-02-28 NOTE — TELEPHONE ENCOUNTER
Patient scheduled an appointment in April  Asking if he will need blood work to be done prior to the appointment        Please advise

## 2023-03-01 ENCOUNTER — TELEPHONE (OUTPATIENT)
Dept: INTERNAL MEDICINE CLINIC | Facility: CLINIC | Age: 68
End: 2023-03-01

## 2023-03-01 ENCOUNTER — NURSE TRIAGE (OUTPATIENT)
Dept: OTHER | Facility: OTHER | Age: 68
End: 2023-03-01

## 2023-03-01 NOTE — TELEPHONE ENCOUNTER
Pt calling in to ask if there is an OTC inhaler?  Says he was at Urgent Care 2/26 and is still having phlegm

## 2023-03-01 NOTE — TELEPHONE ENCOUNTER
The patient is calling about this again  He would like a prescription for an inhaler  He has tried over the counter and they are not helping  Patient does not have shortness of breath

## 2023-03-01 NOTE — TELEPHONE ENCOUNTER
Regarding: Covid+ phlegm  ----- Message from Celso Cage sent at 3/1/2023  2:10 PM EST -----  "They said I have Covid, but I'm not really having symptoms except for this phlegm in my lungs and I'm having trouble sleeping   I called the office this morning, but I haven't heard back

## 2023-03-01 NOTE — TELEPHONE ENCOUNTER
Pt states that he is feeling ok from his covid diagnosis except for his persistent cough  He is requesting a refill on his albuterol inhaler that was originally prescribed for a COPD exacerbation  Pt is already using OTC cough meds  Please call back with care advice  Reason for Disposition  • [1] Caller has NON-URGENT question AND [2] triager unable to answer    Answer Assessment - Initial Assessment Questions  1  COVID-19 ONSET: "When did the symptoms of COVID-19 first start?"      02/26/2023  2  DIAGNOSIS CONFIRMATION: "How were you diagnosed?" (e g , COVID-19 oral or nasal viral test; COVID-19 antibody test; doctor visit)      Nasal PCR  3  MAIN SYMPTOM:  "What is your main concern or symptom right now?" (e g , breathing difficulty, cough, fatigue  loss of smell)      Chest congestion  4  SYMPTOM ONSET: "When did the symptom start?"    A few days  5  BETTER-SAME-WORSE: "Are you getting better, staying the same, or getting worse over the last 1 to 2 weeks?"     Getting a little worse   6  RECENT MEDICAL VISIT: "Have you been seen by a healthcare provider (doctor, NP, PA) for these persisting COVID-19 symptoms?" If Yes, ask: "When were you seen?" (e g , date)     Yes, 02/26  7  COUGH: "Do you have a cough?" If Yes, ask: "How bad is the cough?"        Yes, I cough at night  8  FEVER: "Do you have a fever?" If Yes, ask: "What is your temperature, how was it measured, and when did it start?"      no  9   BREATHING DIFFICULTY: "Are you having any trouble breathing?" If Yes, ask: "How bad is your breathing?" (e g , mild, moderate, severe)     - MILD: No SOB at rest, mild SOB with walking, speaks normally in sentences, can lie down, no retractions, pulse < 100      - MODERATE: SOB at rest, SOB with minimal exertion and prefers to sit, cannot lie down flat, speaks in phrases, mild retractions, audible wheezing, pulse 100-120      - SEVERE: Very SOB at rest, speaks in single words, struggling to breathe, sitting hunched forward, retractions, pulse > 120        no  10   HIGH RISK DISEASE: "Do you have any chronic medical problems?" (e g , asthma, heart or lung disease, weak immune system, obesity, etc )        COPD    Protocols used: CORONAVIRUS (COVID-19) PERSISTING SYMPTOMS FOLLOW-UP CALL-ADULT-[place]

## 2023-03-02 ENCOUNTER — TELEMEDICINE (OUTPATIENT)
Dept: INTERNAL MEDICINE CLINIC | Facility: CLINIC | Age: 68
End: 2023-03-02

## 2023-03-02 DIAGNOSIS — J44.1 COPD EXACERBATION (HCC): ICD-10-CM

## 2023-03-02 RX ORDER — ALBUTEROL SULFATE 90 UG/1
2 AEROSOL, METERED RESPIRATORY (INHALATION) EVERY 6 HOURS PRN
Qty: 18 G | Refills: 3 | Status: CANCELLED | OUTPATIENT
Start: 2023-03-02

## 2023-03-02 RX ORDER — ALBUTEROL SULFATE 90 UG/1
2 AEROSOL, METERED RESPIRATORY (INHALATION) EVERY 6 HOURS PRN
Qty: 8 G | Refills: 0 | Status: SHIPPED | OUTPATIENT
Start: 2023-03-02 | End: 2023-03-16

## 2023-03-15 DIAGNOSIS — J44.1 COPD EXACERBATION (HCC): ICD-10-CM

## 2023-03-15 NOTE — PROGRESS NOTES
COVID-19 Outpatient Progress Note    Assessment/Plan:  · Covid 19  - sx increased cough, malaise  - background copd, dyslipidemia, htn  -(+) covid 19 test  -  explanation about the benefits of paxlovid therapy , patient verbalized understanding however declined, requested refill of home inhaler only, this was provided, strongly emphasized about isolation and if worsening symptoms/clinical deterioration to go to closest ED      Problem List Items Addressed This Visit    None  Visit Diagnoses     COPD exacerbation ([address] )        Relevant Medications    albuterol (PROVENTIL HFA,VENTOLIN HFA) 90 mcg/act inhaler         COVID-19 Plan patient declined pharamcological therapy    Encounter provider: David Prince MD     Provider located at: [address] [place] [id number]     Recent Visits  No visits were found meeting these conditions  Showing recent visits within past 7 days and meeting all other requirements  Future Appointments  No visits were found meeting these conditions    Showing future appointments within next 150 days and meeting all other requirements     COVID-19 HPI telephone visit for evalution of covid 19 results  Lab Results   Component Value Date    SARSCOV2 Positive (A) 02/26/2023       Review of Systems increased cough with productive sputum  Current Outpatient Medications on File Prior to Visit   Medication Sig   • amLODIPine (NORVASC) 10 mg tablet TAKE 1 TABLET BY MOUTH EVERY DAY   • atorvastatin (LIPITOR) 10 mg tablet TAKE 1 TABLET BY MOUTH EVERY DAY   • benzonatate (TESSALON) 200 MG capsule Take 1 capsule (200 mg total) by mouth 3 (three) times a day as needed for cough   • Cholecalciferol (VITAMIN D3) 1000 units CAPS Take 1 capsule by mouth daily   • cyanocobalamin (VITAMIN B-12) 1,000 mcg tablet Take by mouth daily   • fenofibrate (TRICOR) 145 mg tablet TAKE 1 TABLET BY MOUTH EVERY DAY   • hydrochlorothiazide (MICROZIDE) 12 5 mg capsule Take 1 capsule (12 5 mg total) by mouth daily   • ibuprofen (MOTRIN) 800 mg tablet Take 800 mg by mouth every 8 (eight) hours as needed   • lisinopril (ZESTRIL) 30 mg tablet Take 1 tablet (30 mg total) by mouth daily   • metFORMIN (GLUCOPHAGE) 1000 MG tablet Take 1 tablet (1,000 mg total) by mouth 2 (two) times a day with meals   • Multiple Vitamin (MULTIVITAMIN) tablet Take 1 tablet by mouth daily   • VITAMIN B COMPLEX-C CAPS Take 1 capsule by mouth daily   • budesonide (RINOCORT AQUA) 32 MCG/ACT nasal spray 1 spray into each nostril daily (Patient not taking: Reported on 3/2/2023)   • budesonide (RINOCORT AQUA) 32 MCG/ACT nasal spray 1 spray into each nostril daily (Patient not taking: Reported on 10/19/2022)   • Omega-3 Fatty Acids (FISH OIL) 1,000 mg Take 3 capsules by mouth daily (Patient not taking: Reported on 3/2/2023)       Objective: There were no vitals taken for this visit       Physical Exam  Limited : telephone encounter  Rafal Cheema MD

## 2023-03-16 ENCOUNTER — NURSE TRIAGE (OUTPATIENT)
Dept: OTHER | Facility: OTHER | Age: 68
End: 2023-03-16

## 2023-03-16 RX ORDER — ALBUTEROL SULFATE 90 UG/1
AEROSOL, METERED RESPIRATORY (INHALATION)
Qty: 1 G | Refills: 0 | Status: SHIPPED | OUTPATIENT
Start: 2023-03-16

## 2023-03-16 NOTE — TELEPHONE ENCOUNTER
Regarding: urgent med refill  ----- Message from [facility] sent at 3/16/2023  8:17 AM EDT -----  Medication Refill Request     Name albuterol (PROVENTIL HFA,VENTOLIN HFA) 90 mcg/act inhaler   Dose/Frequency as needed 2 puffs @ a time  Quantity 8g  Verified pharmacy   [ x][id number] [address] [facility] [address]   Phone:  [phone]  Fax:  [phone]   Verified ordering Provider   [x ]  Does patient have enough for the next 3 days?  Yes [ ] No [ x]

## 2023-03-16 NOTE — TELEPHONE ENCOUNTER
Patient was informed refill was sent to Dr Phan Carpenter for review this morning  Reason for Disposition  • Caller has NON-URGENT medicine question about med that PCP or specialist prescribed and triager unable to answer question    Answer Assessment - Initial Assessment Questions  1  NAME of MEDICATION: "What medicine are you calling about?"      Albuterol   2  QUESTION: "What is your question?" (e g , medication refill, side effect)      "I am almost out  I have been taking it since I got sick  It helps especially at night  I am still congested and have phlegm "   3  PRESCRIBING HCP: "Who prescribed it?" Reason: if prescribed by specialist, call should be referred to that group  Dr Phan Carpenter   4  SYMPTOMS: "Do you have any symptoms?"      Congestion, phlegm   5   SEVERITY: If symptoms are present, ask "Are they mild, moderate or severe?"      Mild    Protocols used: MEDICATION QUESTION CALL-ADULT-[place]

## 2023-04-07 DIAGNOSIS — J44.1 COPD EXACERBATION (HCC): ICD-10-CM

## 2023-04-07 RX ORDER — ALBUTEROL SULFATE 90 UG/1
AEROSOL, METERED RESPIRATORY (INHALATION)
Qty: 1 G | Refills: 1 | Status: SHIPPED | OUTPATIENT
Start: 2023-04-07

## 2023-04-30 ENCOUNTER — APPOINTMENT (OUTPATIENT)
Dept: MRI IMAGING | Facility: HOSPITAL | Age: 68
End: 2023-04-30

## 2023-04-30 ENCOUNTER — HOSPITAL ENCOUNTER (OUTPATIENT)
Facility: HOSPITAL | Age: 68
Setting detail: OBSERVATION
Discharge: HOME | End: 2023-05-01
Attending: EMERGENCY MEDICINE | Admitting: INTERNAL MEDICINE

## 2023-04-30 ENCOUNTER — APPOINTMENT (EMERGENCY)
Dept: CT IMAGING | Facility: HOSPITAL | Age: 68
End: 2023-04-30

## 2023-04-30 DIAGNOSIS — F17.200 CURRENT SMOKER: ICD-10-CM

## 2023-04-30 DIAGNOSIS — Q21.12 PFO (PATENT FORAMEN OVALE): ICD-10-CM

## 2023-04-30 DIAGNOSIS — G45.9 TIA (TRANSIENT ISCHEMIC ATTACK): Primary | ICD-10-CM

## 2023-04-30 DIAGNOSIS — I63.81 STROKE, LACUNAR (HCC): ICD-10-CM

## 2023-04-30 LAB
ALBUMIN SERPL BCP-MCNC: 4.6 G/DL (ref 3.5–5)
ALP SERPL-CCNC: 80 U/L (ref 34–104)
ALT SERPL W P-5'-P-CCNC: 17 U/L (ref 7–52)
ANION GAP SERPL CALCULATED.3IONS-SCNC: 8 MMOL/L (ref 4–13)
APTT PPP: 28 SECONDS (ref 23–37)
AST SERPL W P-5'-P-CCNC: 15 U/L (ref 13–39)
ATRIAL RATE: 92 BPM
BASOPHILS # BLD AUTO: 0.06 THOUSANDS/ΜL (ref 0–0.1)
BASOPHILS NFR BLD AUTO: 1 % (ref 0–1)
BILIRUB SERPL-MCNC: 0.43 MG/DL (ref 0.2–1)
BUN SERPL-MCNC: 27 MG/DL (ref 5–25)
CALCIUM SERPL-MCNC: 10.4 MG/DL (ref 8.4–10.2)
CHLORIDE SERPL-SCNC: 104 MMOL/L (ref 96–108)
CO2 SERPL-SCNC: 25 MMOL/L (ref 21–32)
CREAT SERPL-MCNC: 1.19 MG/DL (ref 0.6–1.3)
EOSINOPHIL # BLD AUTO: 0.19 THOUSAND/ΜL (ref 0–0.61)
EOSINOPHIL NFR BLD AUTO: 2 % (ref 0–6)
ERYTHROCYTE [DISTWIDTH] IN BLOOD BY AUTOMATED COUNT: 14.6 % (ref 11.6–15.1)
GFR SERPL CREATININE-BSD FRML MDRD: 63 ML/MIN/1.73SQ M
GLUCOSE SERPL-MCNC: 128 MG/DL (ref 65–140)
GLUCOSE SERPL-MCNC: 135 MG/DL (ref 65–140)
GLUCOSE SERPL-MCNC: 141 MG/DL (ref 65–140)
GLUCOSE SERPL-MCNC: 177 MG/DL (ref 65–140)
GLUCOSE SERPL-MCNC: 180 MG/DL (ref 65–140)
HCT VFR BLD AUTO: 51.3 % (ref 36.5–49.3)
HGB BLD-MCNC: 16.7 G/DL (ref 12–17)
IMM GRANULOCYTES # BLD AUTO: 0.04 THOUSAND/UL (ref 0–0.2)
IMM GRANULOCYTES NFR BLD AUTO: 0 % (ref 0–2)
INR PPP: 0.93 (ref 0.84–1.19)
LYMPHOCYTES # BLD AUTO: 4.9 THOUSANDS/ΜL (ref 0.6–4.47)
LYMPHOCYTES NFR BLD AUTO: 42 % (ref 14–44)
MCH RBC QN AUTO: 29 PG (ref 26.8–34.3)
MCHC RBC AUTO-ENTMCNC: 32.6 G/DL (ref 31.4–37.4)
MCV RBC AUTO: 89 FL (ref 82–98)
MONOCYTES # BLD AUTO: 1.36 THOUSAND/ΜL (ref 0.17–1.22)
MONOCYTES NFR BLD AUTO: 12 % (ref 4–12)
NEUTROPHILS # BLD AUTO: 5.2 THOUSANDS/ΜL (ref 1.85–7.62)
NEUTS SEG NFR BLD AUTO: 43 % (ref 43–75)
NRBC BLD AUTO-RTO: 0 /100 WBCS
P AXIS: 61 DEGREES
PLATELET # BLD AUTO: 340 THOUSANDS/UL (ref 149–390)
PMV BLD AUTO: 9.2 FL (ref 8.9–12.7)
POTASSIUM SERPL-SCNC: 3.8 MMOL/L (ref 3.5–5.3)
PR INTERVAL: 172 MS
PROT SERPL-MCNC: 7.7 G/DL (ref 6.4–8.4)
PROTHROMBIN TIME: 12.6 SECONDS (ref 11.6–14.5)
QRS AXIS: 66 DEGREES
QRSD INTERVAL: 102 MS
QT INTERVAL: 340 MS
QTC INTERVAL: 420 MS
RBC # BLD AUTO: 5.76 MILLION/UL (ref 3.88–5.62)
SODIUM SERPL-SCNC: 137 MMOL/L (ref 135–147)
T WAVE AXIS: 56 DEGREES
VENTRICULAR RATE: 92 BPM
WBC # BLD AUTO: 11.75 THOUSAND/UL (ref 4.31–10.16)

## 2023-04-30 RX ORDER — NICOTINE 21 MG/24HR
1 PATCH, TRANSDERMAL 24 HOURS TRANSDERMAL DAILY
Status: DISCONTINUED | OUTPATIENT
Start: 2023-05-01 | End: 2023-05-01 | Stop reason: HOSPADM

## 2023-04-30 RX ORDER — FENOFIBRATE 145 MG/1
145 TABLET, COATED ORAL DAILY
Status: DISCONTINUED | OUTPATIENT
Start: 2023-05-01 | End: 2023-05-01 | Stop reason: HOSPADM

## 2023-04-30 RX ORDER — ACETAMINOPHEN 325 MG/1
650 TABLET ORAL ONCE AS NEEDED
Status: DISCONTINUED | OUTPATIENT
Start: 2023-04-30 | End: 2023-05-01 | Stop reason: HOSPADM

## 2023-04-30 RX ORDER — LORAZEPAM 2 MG/ML
1 INJECTION INTRAMUSCULAR
Status: DISCONTINUED | OUTPATIENT
Start: 2023-05-01 | End: 2023-05-01

## 2023-04-30 RX ORDER — ATORVASTATIN CALCIUM 40 MG/1
40 TABLET, FILM COATED ORAL
Status: DISCONTINUED | OUTPATIENT
Start: 2023-04-30 | End: 2023-04-30

## 2023-04-30 RX ORDER — ALBUTEROL SULFATE 90 UG/1
1 AEROSOL, METERED RESPIRATORY (INHALATION) EVERY 4 HOURS PRN
Status: DISCONTINUED | OUTPATIENT
Start: 2023-04-30 | End: 2023-05-01 | Stop reason: HOSPADM

## 2023-04-30 RX ORDER — FLUTICASONE PROPIONATE 50 MCG
2 SPRAY, SUSPENSION (ML) NASAL DAILY
Status: DISCONTINUED | OUTPATIENT
Start: 2023-05-01 | End: 2023-05-01 | Stop reason: HOSPADM

## 2023-04-30 RX ORDER — ACETAMINOPHEN 325 MG/1
650 TABLET ORAL EVERY 4 HOURS PRN
Status: DISCONTINUED | OUTPATIENT
Start: 2023-04-30 | End: 2023-05-01 | Stop reason: HOSPADM

## 2023-04-30 RX ORDER — BENZONATATE 100 MG/1
200 CAPSULE ORAL 3 TIMES DAILY PRN
Status: DISCONTINUED | OUTPATIENT
Start: 2023-04-30 | End: 2023-05-01 | Stop reason: HOSPADM

## 2023-04-30 RX ORDER — LORATADINE 10 MG/1
5 TABLET ORAL DAILY
Status: DISCONTINUED | OUTPATIENT
Start: 2023-05-01 | End: 2023-05-01 | Stop reason: HOSPADM

## 2023-04-30 RX ORDER — ASPIRIN 81 MG/1
81 TABLET, CHEWABLE ORAL DAILY
Status: DISCONTINUED | OUTPATIENT
Start: 2023-05-01 | End: 2023-05-01 | Stop reason: HOSPADM

## 2023-04-30 RX ORDER — ASPIRIN 325 MG
325 TABLET ORAL ONCE
Status: COMPLETED | OUTPATIENT
Start: 2023-04-30 | End: 2023-04-30

## 2023-04-30 RX ORDER — ATORVASTATIN CALCIUM 40 MG/1
80 TABLET, FILM COATED ORAL
Status: DISCONTINUED | OUTPATIENT
Start: 2023-04-30 | End: 2023-05-01 | Stop reason: HOSPADM

## 2023-04-30 RX ORDER — MELATONIN
1000 DAILY
Status: DISCONTINUED | OUTPATIENT
Start: 2023-05-01 | End: 2023-05-01 | Stop reason: HOSPADM

## 2023-04-30 RX ORDER — ENOXAPARIN SODIUM 100 MG/ML
40 INJECTION SUBCUTANEOUS DAILY
Status: DISCONTINUED | OUTPATIENT
Start: 2023-05-01 | End: 2023-05-01 | Stop reason: HOSPADM

## 2023-04-30 RX ORDER — INSULIN LISPRO 100 [IU]/ML
1-5 INJECTION, SOLUTION INTRAVENOUS; SUBCUTANEOUS
Status: DISCONTINUED | OUTPATIENT
Start: 2023-04-30 | End: 2023-05-01 | Stop reason: HOSPADM

## 2023-04-30 RX ORDER — ONDANSETRON 4 MG/1
4 TABLET, ORALLY DISINTEGRATING ORAL ONCE AS NEEDED
Status: DISCONTINUED | OUTPATIENT
Start: 2023-04-30 | End: 2023-05-01 | Stop reason: HOSPADM

## 2023-04-30 RX ADMIN — ASPIRIN 325 MG ORAL TABLET 325 MG: 325 PILL ORAL at 17:10

## 2023-04-30 RX ADMIN — INSULIN LISPRO 1 UNITS: 100 INJECTION, SOLUTION INTRAVENOUS; SUBCUTANEOUS at 19:14

## 2023-04-30 RX ADMIN — IOHEXOL 85 ML: 350 INJECTION, SOLUTION INTRAVENOUS at 15:35

## 2023-04-30 RX ADMIN — ATORVASTATIN CALCIUM 80 MG: 40 TABLET, FILM COATED ORAL at 18:37

## 2023-04-30 RX ADMIN — SODIUM CHLORIDE, SODIUM LACTATE, POTASSIUM CHLORIDE, AND CALCIUM CHLORIDE 500 ML: .6; .31; .03; .02 INJECTION, SOLUTION INTRAVENOUS at 14:43

## 2023-04-30 NOTE — ASSESSMENT & PLAN NOTE
Holding antihypertensive medications including home regimen of lisinopril, amlodipine, hydrochlorothiazide for permissive hypertension

## 2023-04-30 NOTE — ASSESSMENT & PLAN NOTE
We will place the patient under observation to telemetry unit  Check Neurocheck q4h, Consult Neurology  Check HbA1c and Lipid Panel in AM    Continue patient on aspirin and statin  Consult PT/OT  CT Head -ve for Acute CVA     NIH stroke Scale = 0

## 2023-04-30 NOTE — H&P
[facility]  H&P  Name: Diana Stewart 78 y o  male I MRN: [id number]  Unit/Bed#: ED-36 I Date of Admission: 4/30/2023   Date of Service: 4/30/2023 I Hospital Day: 0  Of note patient has significant claustrophobia and we will order 1 mg of IV Ativan prior to MRI of the brain as needed  Patient is agreeable for this  Assessment/Plan   * TIA (transient ischemic attack)  Assessment & Plan  We will place the patient under observation to telemetry unit  Check Neurocheck q4h, Consult Neurology  Check HbA1c and Lipid Panel in AM    Continue patient on aspirin and statin  Consult PT/OT  CT Head -ve for Acute CVA  NIH stroke Scale = 0      Chronic obstructive pulmonary disease, unspecified COPD type ([facility] [address] )  Assessment & Plan  Continue patient on albuterol inhalers as needed  Patient is active smoker, counseled on smoking cessation  Nicotine patch offered  Diabetes mellitus type 2  Assessment & Plan  Lab Results   Component Value Date    HGBA1C 7 2 (A) 10/19/2022       Recent Labs     04/30/23  1411   POCGLU 135       Blood Sugar Average: Last 72 hrs:  (P) 135   Hold metformin  Start patient on low-dose sliding scale insulin with Accu-Chek 4 times daily  Essential hypertension  Assessment & Plan  Holding antihypertensive medications including home regimen of lisinopril, amlodipine, hydrochlorothiazide for permissive hypertension  Dyslipidemia  Assessment & Plan  Continue patient on statin, continue on Tricor  Date of Service:   04/30/23    VTE Prophylaxis: VTE Score: 3 Moderate Risk (Score 3-4) - Pharmacological DVT Prophylaxis Ordered: enoxaparin (Lovenox)  Code Status: Prior  POLST:There is no POLST form on file for this patient (pre-hospital)   Discussion with family:  Patient declined call to   Anticipated Length of Stay:  Patient will be admitted on an Observation basis with an anticipated length of stay of  < 2 midnights     Justification for Hospital Stay: TIA, Stroke Like symptoms  Total Time for Visit, including Counseling / Coordination of Care: 1 hour  Greater than 50% of this total time spent on direct patient counseling and coordination of care  Chief Complaint:     CVA/TIA-like Symptoms (Pt reports episode unable to speak and R arm numbness for 1 minutes approx 20 mins ago  )    History of Present Illness:    Kaycee Talavera is a 78 y o  male with PMHx significant for COPD, HTN, HLD,  who presents with c/c of Left sided weakness and numbness in his arm and word findingg difficulty which just lasted 30 seconds or less while he was driving at around 1 pm  He then came to the ED for further evaluation  Patient's wife was on the nonpassenger seat and patient was initially intending to pull over but he felt better and back to normal by that time  She did not notice any drooping of his face or saliva  Denied any other symptoms  Denies any similar symptoms in the past   Patient felt fine even before the incident and after that    patient denies any confusion, bowel bladder incontinence or any tonic-clonic movements  Denies any alcohol or recreational use  However he smokes cigarettes  Patient was a stroke alert, had a CT of the head done which was negative for any acute intracranial normalities, twelve-lead EKG was negative for any acute ischemia  Other lab work was within normal limit except for mild leukocytosis  Patient was admitted for stroke  r/o  Vitals are also stable on admission  Review of Systems:  Review of Systems: A thorough 10 point review of System was done which was negative for other than that mentioned in HPI       Past Medical and Surgical History:     Past Medical History:   Diagnosis Date    Adenocarcinoma of prostate [facility])     [id number]  LAST ASSESSED    Diabetes mellitus ([facility] [address] )     Hypertension        Past Surgical History:   Procedure Laterality Date    COLON SURGERY      HERNIA REPAIR      SHOULDER SURGERY      TONSILLECTOMY      TONSILLECTOMY AND ADENOIDECTOMY         Meds/Allergies:  Prior to Admission medications    Medication Sig Start Date End Date Taking?  Authorizing Provider   albuterol (PROVENTIL HFA,VENTOLIN HFA) 90 mcg/act inhaler TAKE 2 PUFFS BY MOUTH EVERY 6 HOURS AS NEEDED FOR WHEEZE 4/7/23   [facility]   amLODIPine (NORVASC) 10 mg tablet TAKE 1 TABLET BY MOUTH EVERY DAY 10/6/22   [facility], DO   atorvastatin (LIPITOR) 10 mg tablet TAKE 1 TABLET BY MOUTH EVERY DAY 2/21/23   [facility], DO   benzonatate (TESSALON) 200 MG capsule Take 1 capsule (200 mg total) by mouth 3 (three) times a day as needed for cough 2/26/23   RUSS Blanco   budesonide (RINOCORT AQUA) 32 MCG/ACT nasal spray 1 spray into each nostril daily  Patient not taking: Reported on 3/2/2023 6/9/17   Historical Provider, MD   budesonide (RINOCORT AQUA) 32 MCG/ACT nasal spray 1 spray into each nostril daily  Patient not taking: Reported on 10/19/2022 3/29/21   [facility]   cetirizine (ZyrTEC) 5 MG tablet Take 2 tablets (10 mg total) by mouth daily 3/8/23   Zaid Kaur MD   Cholecalciferol (VITAMIN D3) 1000 units CAPS Take 1 capsule by mouth daily 4/21/15   Historical Provider, MD   cyanocobalamin (VITAMIN B-12) 1,000 mcg tablet Take by mouth daily    Historical Provider, MD   fenofibrate (TRICOR) 145 mg tablet TAKE 1 TABLET BY MOUTH EVERY DAY 2/21/23   [facility]   fluticasone [facility]) 50 mcg/act nasal spray 2 sprays into each nostril daily 3/8/23   Zaid Karu MD   hydrochlorothiazide (MICROZIDE) 12 5 mg capsule Take 1 capsule (12 5 mg total) by mouth daily 11/23/22   [facility]   ibuprofen (MOTRIN) 800 mg tablet Take 800 mg by mouth every 8 (eight) hours as needed 11/10/18   Historical Provider, MD   lisinopril (ZESTRIL) 30 mg tablet Take 1 tablet (30 mg total) by mouth daily 11/14/22   Erich Anand, DO   metFORMIN (GLUCOPHAGE) 1000 MG tablet Take 1 tablet (1,000 mg total) by mouth 2 (two) times a day with meals 11/14/22   Winifred Fields,    Multiple Vitamin (MULTIVITAMIN) tablet Take 1 tablet by mouth daily    Historical Provider, MD   Omega-3 Fatty Acids (FISH OIL) 1,000 mg Take 3 capsules by mouth daily  Patient not taking: Reported on 3/2/2023    Historical Provider, MD   VITAMIN B COMPLEX-C CAPS Take 1 capsule by mouth daily    Historical Provider, MD     I have reviewed home medications with patient personally  Allergies: Allergies   Allergen Reactions    Penicillins      Other reaction(s): Other (See Comments)  Unknown  Other reaction(s): Other (See Comments)  Unknown  Other reaction(s): Unknown  Category: Allergy;        Social History:     Marital Status: /Civil Union   Occupation: Reviewed and Non Contributory   Patient Pre-hospital Living Situation: Home   Patient Pre-hospital Level of Mobility: Walks   Patient Pre-hospital Diet Restrictions: None     Substance Use History:   Social History     Substance and Sexual Activity   Alcohol Use Not Currently     Social History     Tobacco Use   Smoking Status Every Day    Packs/day: 1 00    Types: Cigarettes   Smokeless Tobacco Never     Social History     Substance and Sexual Activity   Drug Use No       Family History:    Family History   Problem Relation Age of Onset    Diabetes Father         MELLITUS       Physical Exam:     Vitals:   Blood Pressure: 138/80 (04/30/23 1500)  Pulse: 86 (04/30/23 1500)  Temperature: 98 3 °F (36 8 °C) (04/30/23 1407)  Temp Source: Oral (04/30/23 1407)  Respirations: 16 (04/30/23 1500)  Weight - Scale: 82 kg (180 lb 12 4 oz) (04/30/23 1404)  SpO2: 97 % (04/30/23 1500)    Physical Exam  General Exam: Alert and Oriented x 3, NAD  Eyes: ANUPAMA  Neck: Supple  CVS: S1, S2 [place], RRR    R/S: Clear to auscultate anteriorly  Abd: Soft, NT, ND, BS+ve  Extremities: No edema noted  Skin: No acute Rash noted  CNS: No acute FND   Moves all 4 "extremities  Psych: Co-operative, Not agitated  Lab Results: I have personally reviewed pertinent reports  Results from last 7 days   Lab Units 04/30/23  1419   WBC Thousand/uL 11 75*   HEMOGLOBIN g/dL 16 7   HEMATOCRIT % 51 3*   PLATELETS Thousands/uL 340     Results from last 7 days   Lab Units 04/30/23  1419   POTASSIUM mmol/L 3 8   CHLORIDE mmol/L 104   CO2 mmol/L 25   BUN mg/dL 27*   CREATININE mg/dL 1 19   CALCIUM mg/dL 10 4*   ALK PHOS U/L 80   ALT U/L 17   AST U/L 15     Results from last 7 days   Lab Units 04/30/23  1419   INR  0 93     Results from last 7 days   Lab Units 04/30/23  1411   POC GLUCOSE mg/dl 135           Imaging: I have personally reviewed pertinent reports  CTA head and neck with and without contrast   Final Result by Gino Jain MD (04/30 1620)      No mass effect, acute intracranial hemorrhage or CT evidence for a large acute vascular distribution infarct  No evidence for high-grade stenosis, focal occlusion or vascular aneurysm of the cervical or intracranial vasculature  Atherosclerotic plaque at the carotid bulbs without evidence for high-grade stenosis or focal occlusion of the internal carotid arteries  Workstation performed: [id number]             CTA head and neck with and without contrast   Final Result      No mass effect, acute intracranial hemorrhage or CT evidence for a large acute vascular distribution infarct  No evidence for high-grade stenosis, focal occlusion or vascular aneurysm of the cervical or intracranial vasculature  Atherosclerotic plaque at the carotid bulbs without evidence for high-grade stenosis or focal occlusion of the internal carotid arteries  Workstation performed: [id number]             EKG, Imaging, and Other Studies Reviewed on Admission:   · NSR    Discussed the case with ED Provider  [facility] Reviewed: Yes     ** Please Note:  \"This note has been constructed using a voice " "recognition system  Therefore there may be syntax, spelling, and/or grammatical errors  Please call if you have any questions   \"**    "

## 2023-04-30 NOTE — ED PROVIDER NOTES
History  Chief Complaint   Patient presents with    CVA/TIA-like Symptoms     Pt reports episode unable to speak and R arm numbness for 1 minutes approx 20 mins ago  52-year-old male with history of tobacco use, DM, hypertension presents with CVA-like symptoms  Patient states approximately 15 minutes prior to arrival he was driving when he all of a sudden had flaccid paralysis and numbness of his left arm and was unable to speak  Episode lasted approximately 30 seconds and then resolved  No previous episode of similar  Spouse in room denies facial droop at that time  Patient states that he was not confused and was fully alert and focused on his driving  States that he felt completely normal prior to incident and has not had any recent issues  Denies any recent trauma, or head injury  Denies alcohol use, recreational drug use  Endorses 1 pack a day tobacco use  Denies fevers, chills, headache, vision changes, slurred speech, facial droop, confusion, chest pain, palpitations, diaphoresis, shortness of breath, abdominal pain, changes in urinary or bowel habits  History provided by:  Spouse and patient   used: No        Prior to Admission Medications   Prescriptions Last Dose Informant Patient Reported? Taking?    Cholecalciferol (VITAMIN D3) 1000 units CAPS   Yes No   Sig: Take 1 capsule by mouth daily   Multiple Vitamin (MULTIVITAMIN) tablet   Yes No   Sig: Take 1 tablet by mouth daily   Omega-3 Fatty Acids (FISH OIL) 1,000 mg   Yes No   Sig: Take 3 capsules by mouth daily   Patient not taking: Reported on 3/2/2023   VITAMIN B COMPLEX-C CAPS   Yes No   Sig: Take 1 capsule by mouth daily   albuterol (PROVENTIL HFA,VENTOLIN HFA) 90 mcg/act inhaler   No No   Sig: TAKE 2 PUFFS BY MOUTH EVERY 6 HOURS AS NEEDED FOR WHEEZE   amLODIPine (NORVASC) 10 mg tablet   No No   Sig: TAKE 1 TABLET BY MOUTH EVERY DAY   atorvastatin (LIPITOR) 10 mg tablet   No No   Sig: TAKE 1 TABLET BY MOUTH EVERY DAY   benzonatate (TESSALON) 200 MG capsule   No No   Sig: Take 1 capsule (200 mg total) by mouth 3 (three) times a day as needed for cough   budesonide (RINOCORT AQUA) 32 MCG/ACT nasal spray   Yes No   Si spray into each nostril daily   Patient not taking: Reported on 3/2/2023   budesonide (RINOCORT AQUA) 32 MCG/ACT nasal spray   No No   Si spray into each nostril daily   Patient not taking: Reported on 10/19/2022   cetirizine (ZyrTEC) 5 MG tablet   No No   Sig: Take 2 tablets (10 mg total) by mouth daily   cyanocobalamin (VITAMIN B-12) 1,000 mcg tablet   Yes No   Sig: Take by mouth daily   fenofibrate (TRICOR) 145 mg tablet   No No   Sig: TAKE 1 TABLET BY MOUTH EVERY DAY   fluticasone (FLONASE) 50 mcg/act nasal spray   No No   Si sprays into each nostril daily   hydrochlorothiazide (MICROZIDE) 12 5 mg capsule   No No   Sig: Take 1 capsule (12 5 mg total) by mouth daily   ibuprofen (MOTRIN) 800 mg tablet   Yes No   Sig: Take 800 mg by mouth every 8 (eight) hours as needed   lisinopril (ZESTRIL) 30 mg tablet   No No   Sig: Take 1 tablet (30 mg total) by mouth daily   metFORMIN (GLUCOPHAGE) 1000 MG tablet   No No   Sig: Take 1 tablet (1,000 mg total) by mouth 2 (two) times a day with meals      Facility-Administered Medications: None       Past Medical History:   Diagnosis Date    Adenocarcinoma of prostate ([facility] )     [id number]  LAST ASSESSED    Diabetes mellitus ([facility] )     Hypertension        Past Surgical History:   Procedure Laterality Date    COLON SURGERY      HERNIA REPAIR      SHOULDER SURGERY      TONSILLECTOMY      TONSILLECTOMY AND ADENOIDECTOMY         Family History   Problem Relation Age of Onset    Diabetes Father         MELLITUS     I have reviewed and agree with the history as documented      E-Cigarette/Vaping    E-Cigarette Use Never User      E-Cigarette/Vaping Substances    Nicotine No     THC No     CBD No     Flavoring No     Other No     Unknown No      Social History Tobacco Use    Smoking status: Every Day     Packs/day: 1 00     Types: Cigarettes    Smokeless tobacco: Never   Vaping Use    Vaping Use: Never used   Substance Use Topics    Alcohol use: Not Currently    Drug use: No        Review of Systems   Constitutional: Negative for chills, diaphoresis and fever  HENT: Negative for voice change  Eyes: Negative for visual disturbance  Respiratory: Negative for chest tightness and shortness of breath  Cardiovascular: Negative for chest pain and palpitations  Gastrointestinal: Negative for abdominal pain, constipation, diarrhea, nausea and vomiting  Neurological: Positive for speech difficulty, weakness and numbness  Negative for dizziness, syncope, facial asymmetry, light-headedness and headaches  Psychiatric/Behavioral: Negative for confusion  All other systems reviewed and are negative  Physical Exam  ED Triage Vitals   Temperature Pulse Respirations Blood Pressure SpO2   04/30/23 1407 04/30/23 1404 04/30/23 1404 04/30/23 1404 04/30/23 1404   98 3 °F (36 8 °C) 80 18 (!) 176/80 98 %      Temp Source Heart Rate Source Patient Position - Orthostatic VS BP Location FiO2 (%)   04/30/23 1407 04/30/23 1404 04/30/23 1430 04/30/23 1404 --   Oral Monitor Lying Right arm       Pain Score       04/30/23 1404       No Pain             Orthostatic Vital Signs  Vitals:    04/30/23 1404 04/30/23 1430 04/30/23 1500   BP: (!) 176/80 162/95 138/80   Pulse: 80 91 86   Patient Position - Orthostatic VS:  Lying Lying       Physical Exam  Vitals and nursing note reviewed  Constitutional:       General: He is not in acute distress  Appearance: Normal appearance  He is normal weight  He is not ill-appearing, toxic-appearing or diaphoretic  HENT:      Head: Normocephalic and atraumatic        Right Ear: External ear normal       Left Ear: External ear normal       Nose: Nose normal       Mouth/Throat:      Mouth: Mucous membranes are moist       Pharynx: Oropharynx is clear  Eyes:      General: No scleral icterus  Right eye: No discharge  Left eye: No discharge  Extraocular Movements: Extraocular movements intact  Conjunctiva/sclera: Conjunctivae normal       Pupils: Pupils are equal, round, and reactive to light  Cardiovascular:      Rate and Rhythm: Normal rate and regular rhythm  Pulses: Normal pulses  Heart sounds: Normal heart sounds  Pulmonary:      Effort: Pulmonary effort is normal  No respiratory distress  Breath sounds: Normal breath sounds  No stridor  No wheezing, rhonchi or rales  Abdominal:      General: There is no distension  Palpations: Abdomen is soft  There is no mass  Tenderness: There is no abdominal tenderness  There is no right CVA tenderness, left CVA tenderness, guarding or rebound  Musculoskeletal:         General: No swelling, tenderness, deformity or signs of injury  Normal range of motion  Cervical back: Normal range of motion and neck supple  No rigidity or tenderness  Right lower leg: No edema  Left lower leg: No edema  Skin:     General: Skin is warm and dry  Capillary Refill: Capillary refill takes less than 2 seconds  Coloration: Skin is not jaundiced or pale  Findings: No bruising, erythema, lesion or rash  Neurological:      General: No focal deficit present  Mental Status: He is alert and oriented to person, place, and time  Cranial Nerves: No cranial nerve deficit  Sensory: No sensory deficit  Motor: No weakness  Gait: Gait normal       Comments: -Face symmetrical and tongue midline   Normal speech   -Cranial nerves II-XII intact  -Pronator drift negative  - strength strong and equal bilaterally  -Elbow flexor/extensor strength 5/5 bilaterally  -Sensation to light touch intact over distal arm bilaterally  -Finger to Nose testing normal bilaterally  -Hip flexor strength 5/5 bilaterally  -Knee flexor/extensor strength 5/5 bilaterally  -Heel flexor/extensor strength 5/5 bilaterally  -Sensation to light touch intact over lower extremities bilaterally  -Heel to shin testing normal bilaterally      Psychiatric:         Mood and Affect: Mood normal          Behavior: Behavior normal          ED Medications  Medications   ondansetron (ZOFRAN-ODT) dispersible tablet 4 mg (has no administration in time range)   acetaminophen (TYLENOL) tablet 650 mg (has no administration in time range)   lactated ringers bolus 500 mL (0 mL Intravenous Stopped 4/30/23 1605)   iohexol (OMNIPAQUE) 350 MG/ML injection (SINGLE-DOSE) 85 mL (85 mL Intravenous Given 4/30/23 1535)   aspirin tablet 325 mg (325 mg Oral Given 4/30/23 1710)       Diagnostic Studies  Results Reviewed     Procedure Component Value Units Date/Time    Comprehensive metabolic panel [[id number]]  (Abnormal) Collected: 04/30/23 1419    Lab Status: Final result Specimen: Blood from Arm, Right Updated: 04/30/23 1446     Sodium 137 mmol/L      Potassium 3 8 mmol/L      Chloride 104 mmol/L      CO2 25 mmol/L      ANION GAP 8 mmol/L      BUN 27 mg/dL      Creatinine 1 19 mg/dL      Glucose 141 mg/dL      Calcium 10 4 mg/dL      AST 15 U/L      ALT 17 U/L      Alkaline Phosphatase 80 U/L      Total Protein 7 7 g/dL      Albumin 4 6 g/dL      Total Bilirubin 0 43 mg/dL      eGFR 63 ml/min/1 73sq m     Narrative:      [place] guidelines for Chronic Kidney Disease (CKD):     Stage 1 with normal or high GFR (GFR > 90 mL/min/1 73 square meters)    Stage 2 Mild CKD (GFR = 60-89 mL/min/1 73 square meters)    Stage 3A Moderate CKD (GFR = 45-59 mL/min/1 73 square meters)    Stage 3B Moderate CKD (GFR = 30-44 mL/min/1 73 square meters)    Stage 4 Severe CKD (GFR = 15-29 mL/min/1 73 square meters)    Stage 5 End Stage CKD (GFR <15 mL/min/1 73 square meters)  Note: GFR calculation is accurate only with a steady state creatinine    CBC and differential [[id number]] (Abnormal) Collected: 04/30/23 1419    Lab Status: Final result Specimen: Blood from Arm, Right Updated: 04/30/23 1444     WBC 11 75 Thousand/uL      RBC 5 76 Million/uL      Hemoglobin 16 7 g/dL      Hematocrit 51 3 %      MCV 89 fL      MCH 29 0 pg      MCHC 32 6 g/dL      RDW 14 6 %      MPV 9 2 fL      Platelets 060 Thousands/uL      nRBC 0 /100 WBCs      Neutrophils Relative 43 %      Immat GRANS % 0 %      Lymphocytes Relative 42 %      Monocytes Relative 12 %      Eosinophils Relative 2 %      Basophils Relative 1 %      Neutrophils Absolute 5 20 Thousands/µL      Immature Grans Absolute 0 04 Thousand/uL      Lymphocytes Absolute 4 90 Thousands/µL      Monocytes Absolute 1 36 Thousand/µL      Eosinophils Absolute 0 19 Thousand/µL      Basophils Absolute 0 06 Thousands/µL     APTT [[id number]]  (Normal) Collected: 04/30/23 1419    Lab Status: Final result Specimen: Blood from Arm, Right Updated: 04/30/23 1442     PTT 28 seconds     Protime-INR [[id number]]  (Normal) Collected: 04/30/23 1419    Lab Status: Final result Specimen: Blood from Arm, Right Updated: 04/30/23 1442     Protime 12 6 seconds      INR 0 93    Fingerstick Glucose (POCT) [[id number]]  (Normal) Collected: 04/30/23 1411    Lab Status: Final result Updated: 04/30/23 1412     POC Glucose 135 mg/dl                  CTA head and neck with and without contrast   Final Result by Jaime Mathias MD (04/30 1620)      No mass effect, acute intracranial hemorrhage or CT evidence for a large acute vascular distribution infarct  No evidence for high-grade stenosis, focal occlusion or vascular aneurysm of the cervical or intracranial vasculature  Atherosclerotic plaque at the carotid bulbs without evidence for high-grade stenosis or focal occlusion of the internal carotid arteries              Workstation performed: [id number]               Procedures  ECG 12 Lead Documentation Only    Date/Time: 4/30/2023 3:15 PM  Performed by: Nette Waller MD  Authorized by: Chan Blackmon MD       EKG April 30, 2023 at 1409 normal sinus rhythm rate of 92, normal W[place] interval, normal QRS interval, QTc 420, normal axis, no ST elevations, no ST depressions, no arrhythmias  No previous EKG for comparison  ED Course  ED Course as of 04/30/23 1735   Sun Apr 30, 2023   1623 IMPRESSION:     No mass effect, acute intracranial hemorrhage or CT evidence for a large acute vascular distribution infarct      No evidence for high-grade stenosis, focal occlusion or vascular aneurysm of the cervical or intracranial vasculature      Atherosclerotic plaque at the carotid bulbs without evidence for high-grade stenosis or focal occlusion of the internal carotid arteries            Workstation performed: [id number]   [id number] Reached out to Neurology regarding placement of patient  ABCD2 Score    Flowsheet Row Most Recent Value   ABCD2 Score    Age 60+ 1 Filed at: 04/30/2023 1419   Initial SBP >140 or DBP >90  1 Filed at: 04/30/2023 1419   Clinical Features of TIA 2 Filed at: 04/30/2023 1419   Duration of Symptoms 0 Filed at: 04/30/2023 1419   History of Diabetes 1 Filed at: 04/30/2023 1419   ABCD2 Score 5 Filed at: 04/30/2023 1419                              SBIRT 20yo+    Flowsheet Row Most Recent Value   Initial Alcohol Screen: US AUDIT-C     1  How often do you have a drink containing alcohol? 0 Filed at: 04/30/2023 1412   2  How many drinks containing alcohol do you have on a typical day you are drinking? 0 Filed at: 04/30/2023 1412   3a  Male UNDER 65: How often do you have five or more drinks on one occasion? 0 Filed at: 04/30/2023 1412   3b  FEMALE Any Age, or MALE 65+: How often do you have 4 or more drinks on one occassion? 0 Filed at: 04/30/2023 1412   Audit-C Score 0 Filed at: 04/30/2023 1412   [place]: How many times in the past year have you    Used an illegal drug or used a prescription medication for non-medical reasons?  Never Filed at: 04/30/2023 1412 Medical Decision Making  52-year-old male with history of hypertension, DM, tobacco use presents with CVA-like symptoms  Symptoms concerning for acute TIA  Symptoms have since resolved  Vitals within normal limits, normal cardiopulmonary exam, normal neuro exam   Differential includes but not limited to TIA, CVA, hypoglycemia, arrhythmia  EKG as described above, normal sinus rhythm, no noted arrhythmia  Labs as above and within normal limits  No hypoglycemia noted  Symptoms have since resolved  ABCD2 score of 5  CTA head and neck negative for critical stenosis or large vessel disease  Reached out to neurology and advising admission to observation for stroke pathway work-up  Aspirin given  Admitted to internal medicine for further work-up  Amount and/or Complexity of Data Reviewed  Labs: ordered  Radiology: ordered  Risk  OTC drugs  Prescription drug management  Decision regarding hospitalization  Disposition  Final diagnoses:   TIA (transient ischemic attack)     Time reflects when diagnosis was documented in both MDM as applicable and the Disposition within this note     Time User Action Codes Description Comment    4/30/2023  4:47 PM Jose D Ha Add [G45 9] TIA (transient ischemic attack)       ED Disposition     ED Disposition   Admit    Condition   Stable    Date/Time   Sun Apr 30, 2023  4:47 PM    Comment   Case was discussed with Sanchez Forbes and the patient's admission status was agreed to be Admission Status: observation status to the service of Dr Sanchez Forbes              Follow-up Information    None         Current Discharge Medication List      CONTINUE these medications which have NOT CHANGED    Details   albuterol (PROVENTIL HFA,VENTOLIN HFA) 90 mcg/act inhaler TAKE 2 PUFFS BY MOUTH EVERY 6 HOURS AS NEEDED FOR WHEEZE  Qty: 1 g, Refills: 1    Associated Diagnoses: COPD exacerbation (HCC)      amLODIPine (NORVASC) 10 mg tablet TAKE 1 TABLET BY MOUTH EVERY DAY  Qty: 90 tablet, Refills: 1    Associated Diagnoses: Essential hypertension      atorvastatin (LIPITOR) 10 mg tablet TAKE 1 TABLET BY MOUTH EVERY DAY  Qty: 30 tablet, Refills: 3    Associated Diagnoses: Dyslipidemia      benzonatate (TESSALON) 200 MG capsule Take 1 capsule (200 mg total) by mouth 3 (three) times a day as needed for cough  Qty: 20 capsule, Refills: 0    Associated Diagnoses: Upper respiratory tract infection, unspecified type      !! budesonide (RINOCORT AQUA) 32 MCG/ACT nasal spray 1 spray into each nostril daily      !! budesonide (RINOCORT AQUA) 32 MCG/ACT nasal spray 1 spray into each nostril daily  Qty: 1 Bottle, Refills: 4    Associated Diagnoses: Seasonal allergies      cetirizine (ZyrTEC) 5 MG tablet Take 2 tablets (10 mg total) by mouth daily  Qty: 30 tablet, Refills: 3    Associated Diagnoses: Recurrent sinusitis      Cholecalciferol (VITAMIN D3) 1000 units CAPS Take 1 capsule by mouth daily      cyanocobalamin (VITAMIN B-12) 1,000 mcg tablet Take by mouth daily      fenofibrate (TRICOR) 145 mg tablet TAKE 1 TABLET BY MOUTH EVERY DAY  Qty: 30 tablet, Refills: 5    Associated Diagnoses: Hypertriglyceridemia      fluticasone (FLONASE) 50 mcg/act nasal spray 2 sprays into each nostril daily  Qty: 9 9 mL, Refills: 3    Associated Diagnoses: Recurrent sinusitis      hydrochlorothiazide (MICROZIDE) 12 5 mg capsule Take 1 capsule (12 5 mg total) by mouth daily  Qty: 90 capsule, Refills: 1    Associated Diagnoses: Essential hypertension      ibuprofen (MOTRIN) 800 mg tablet Take 800 mg by mouth every 8 (eight) hours as needed  Refills: 0      lisinopril (ZESTRIL) 30 mg tablet Take 1 tablet (30 mg total) by mouth daily  Qty: 90 tablet, Refills: 1    Associated Diagnoses: Essential hypertension      metFORMIN (GLUCOPHAGE) 1000 MG tablet Take 1 tablet (1,000 mg total) by mouth 2 (two) times a day with meals  Qty: 180 tablet, Refills: 3    Associated Diagnoses: Diabetes 1 5, managed as type 2 ([facility] ) Multiple Vitamin (MULTIVITAMIN) tablet Take 1 tablet by mouth daily      Omega-3 Fatty Acids (FISH OIL) 1,000 mg Take 3 capsules by mouth daily      VITAMIN B COMPLEX-C CAPS Take 1 capsule by mouth daily       ! ! - Potential duplicate medications found  Please discuss with provider  No discharge procedures on file  PDMP Review     None           ED Provider  Attending physically available and evaluated Miguelangelus Romerocordelia  I managed the patient along with the ED Attending      Electronically Signed by         Joya Harrison MD  04/30/23 [id number]

## 2023-04-30 NOTE — PLAN OF CARE
Problem: Potential for Falls  Goal: Patient will remain free of falls  Description: INTERVENTIONS:  - Educate patient/family on patient safety including physical limitations  - Instruct patient to call for assistance with activity   - Consult OT/PT to assist with strengthening/mobility   - Keep Call bell within reach  - Keep bed low and locked with side rails adjusted as appropriate  - Keep care items and personal belongings within reach  - Initiate and maintain comfort rounds  - Make Fall Risk Sign visible to staff  - Apply yellow socks and bracelet for high fall risk patients  - Consider moving patient to room near nurses station  Outcome: Progressing     Problem: NEUROSENSORY - ADULT  Goal: Achieves stable or improved neurological status  Description: INTERVENTIONS  - Monitor and report changes in neurological status  - Monitor vital signs such as temperature, blood pressure, glucose, and any other labs ordered   - Initiate measures to prevent increased intracranial pressure  - Monitor for seizure activity and implement precautions if appropriate      Outcome: Progressing  Goal: Achieves maximal functionality and self care  Description: INTERVENTIONS  - Monitor swallowing and airway patency with patient fatigue and changes in neurological status  - Encourage and assist patient to increase activity and self care     - Encourage visually impaired, hearing impaired and aphasic patients to use assistive/communication devices  Outcome: Progressing     Problem: PAIN - ADULT  Goal: Verbalizes/displays adequate comfort level or baseline comfort level  Description: Interventions:  - Encourage patient to monitor pain and request assistance  - Assess pain using appropriate pain scale  - Administer analgesics based on type and severity of pain and evaluate response  - Implement non-pharmacological measures as appropriate and evaluate response  - Consider cultural and social influences on pain and pain management  - Notify physician/advanced practitioner if interventions unsuccessful or patient reports new pain  Outcome: Progressing     Problem: INFECTION - ADULT  Goal: Absence or prevention of progression during hospitalization  Description: INTERVENTIONS:  - Assess and monitor for signs and symptoms of infection  - Monitor lab/diagnostic results  - Monitor all insertion sites, i e  indwelling lines, tubes, and drains  - Monitor endotracheal if appropriate and nasal secretions for changes in amount and color  - [place] appropriate cooling/warming therapies per order  - Administer medications as ordered  - Instruct and encourage patient and family to use good hand hygiene technique  - Identify and instruct in appropriate isolation precautions for identified infection/condition  Outcome: Progressing  Goal: Absence of fever/infection during neutropenic period  Description: INTERVENTIONS:  - Monitor WBC    Outcome: Progressing     Problem: SAFETY ADULT  Goal: Patient will remain free of falls  Description: INTERVENTIONS:  - Educate patient/family on patient safety including physical limitations  - Instruct patient to call for assistance with activity   - Consult OT/PT to assist with strengthening/mobility   - Keep Call bell within reach  - Keep bed low and locked with side rails adjusted as appropriate  - Keep care items and personal belongings within reach  - Initiate and maintain comfort rounds  - Make Fall Risk Sign visible to staff  - Apply yellow socks and bracelet for high fall risk patients  - Consider moving patient to room near nurses station  Outcome: Progressing  Goal: Maintain or return to baseline ADL function  Description: INTERVENTIONS:  -  Assess patient's ability to carry out ADLs; assess patient's baseline for ADL function and identify physical deficits which impact ability to perform ADLs (bathing, care of mouth/teeth, toileting, grooming, dressing, etc )  - Assess/evaluate cause of self-care deficits   - Assess range of motion  - Assess patient's mobility; develop plan if impaired  - Assess patient's need for assistive devices and provide as appropriate  - Encourage maximum independence but intervene and supervise when necessary  - Involve family in performance of ADLs  - Assess for home care needs following discharge   - Consider OT consult to assist with ADL evaluation and planning for discharge  - Provide patient education as appropriate  Outcome: Progressing  Goal: Maintains/Returns to pre admission functional level  Description: INTERVENTIONS:  - Perform BMAT or MOVE assessment daily    - Set and communicate daily mobility goal to care team and patient/family/caregiver  - Collaborate with rehabilitation services on mobility goals if consulted  - Out of bed for toileting  - Record patient progress and toleration of activity level   Outcome: Progressing     Problem: DISCHARGE PLANNING  Goal: Discharge to home or other facility with appropriate resources  Description: INTERVENTIONS:  - Identify barriers to discharge w/patient and caregiver  - Arrange for needed discharge resources and transportation as appropriate  - Identify discharge learning needs (meds, wound care, etc )  - Arrange for interpretive services to assist at discharge as needed  - Refer to Case Management Department for coordinating discharge planning if the patient needs post-hospital services based on physician/advanced practitioner order or complex needs related to functional status, cognitive ability, or social support system  Outcome: Progressing     Problem: Knowledge Deficit  Goal: Patient/family/caregiver demonstrates understanding of disease process, treatment plan, medications, and discharge instructions  Description: Complete learning assessment and assess knowledge base    Interventions:  - Provide teaching at level of understanding  - Provide teaching via preferred learning methods  Outcome: Progressing

## 2023-04-30 NOTE — ASSESSMENT & PLAN NOTE
Lab Results   Component Value Date    HGBA1C 7 2 (A) 10/19/2022       Recent Labs     04/30/23  1411   POCGLU 135       Blood Sugar Average: Last 72 hrs:  (P) 135   Hold metformin  Start patient on low-dose sliding scale insulin with Accu-Chek 4 times daily

## 2023-04-30 NOTE — ASSESSMENT & PLAN NOTE
Continue patient on albuterol inhalers as needed  Patient is active smoker, counseled on smoking cessation  Nicotine patch offered

## 2023-05-01 ENCOUNTER — APPOINTMENT (OUTPATIENT)
Dept: NON INVASIVE DIAGNOSTICS | Facility: HOSPITAL | Age: 68
End: 2023-05-01

## 2023-05-01 VITALS
HEIGHT: 69 IN | WEIGHT: 180 LBS | BODY MASS INDEX: 26.66 KG/M2 | TEMPERATURE: 98 F | HEART RATE: 75 BPM | OXYGEN SATURATION: 96 % | DIASTOLIC BLOOD PRESSURE: 79 MMHG | RESPIRATION RATE: 16 BRPM | SYSTOLIC BLOOD PRESSURE: 132 MMHG

## 2023-05-01 PROBLEM — I63.81 STROKE, LACUNAR (HCC): Status: ACTIVE | Noted: 2023-05-01

## 2023-05-01 LAB
ANION GAP SERPL CALCULATED.3IONS-SCNC: 7 MMOL/L (ref 4–13)
AORTIC ROOT: 3.8 CM
APICAL FOUR CHAMBER EJECTION FRACTION: 71 %
ASCENDING AORTA: 3.4 CM
BASOPHILS # BLD AUTO: 0.04 THOUSANDS/ΜL (ref 0–0.1)
BASOPHILS NFR BLD AUTO: 0 % (ref 0–1)
BUN SERPL-MCNC: 19 MG/DL (ref 5–25)
CALCIUM SERPL-MCNC: 9.6 MG/DL (ref 8.4–10.2)
CHLORIDE SERPL-SCNC: 105 MMOL/L (ref 96–108)
CHOLEST SERPL-MCNC: 112 MG/DL
CO2 SERPL-SCNC: 25 MMOL/L (ref 21–32)
CREAT SERPL-MCNC: 0.78 MG/DL (ref 0.6–1.3)
E WAVE DECELERATION TIME: 309 MS
EOSINOPHIL # BLD AUTO: 0.19 THOUSAND/ΜL (ref 0–0.61)
EOSINOPHIL NFR BLD AUTO: 2 % (ref 0–6)
ERYTHROCYTE [DISTWIDTH] IN BLOOD BY AUTOMATED COUNT: 14.6 % (ref 11.6–15.1)
FRACTIONAL SHORTENING: 35 % (ref 28–44)
GFR SERPL CREATININE-BSD FRML MDRD: 94 ML/MIN/1.73SQ M
GLUCOSE P FAST SERPL-MCNC: 123 MG/DL (ref 65–99)
GLUCOSE SERPL-MCNC: 106 MG/DL (ref 65–140)
GLUCOSE SERPL-MCNC: 123 MG/DL (ref 65–140)
GLUCOSE SERPL-MCNC: 131 MG/DL (ref 65–140)
HCT VFR BLD AUTO: 48.4 % (ref 36.5–49.3)
HDLC SERPL-MCNC: 32 MG/DL
HGB BLD-MCNC: 15.3 G/DL (ref 12–17)
IMM GRANULOCYTES # BLD AUTO: 0.06 THOUSAND/UL (ref 0–0.2)
IMM GRANULOCYTES NFR BLD AUTO: 1 % (ref 0–2)
INTERVENTRICULAR SEPTUM IN DIASTOLE (PARASTERNAL SHORT AXIS VIEW): 1 CM
INTERVENTRICULAR SEPTUM: 1 CM (ref 0.6–1.1)
LAAS-AP2: 13.8 CM2
LAAS-AP4: 12.7 CM2
LDLC SERPL CALC-MCNC: 53 MG/DL (ref 0–100)
LEFT ATRIUM SIZE: 3.3 CM
LEFT INTERNAL DIMENSION IN SYSTOLE: 2.8 CM (ref 2.1–4)
LEFT VENTRICULAR INTERNAL DIMENSION IN DIASTOLE: 4.3 CM (ref 3.5–6)
LEFT VENTRICULAR POSTERIOR WALL IN END DIASTOLE: 1 CM
LEFT VENTRICULAR STROKE VOLUME: 56 ML
LVSV (TEICH): 56 ML
LYMPHOCYTES # BLD AUTO: 3.78 THOUSANDS/ΜL (ref 0.6–4.47)
LYMPHOCYTES NFR BLD AUTO: 32 % (ref 14–44)
MCH RBC QN AUTO: 28.5 PG (ref 26.8–34.3)
MCHC RBC AUTO-ENTMCNC: 31.6 G/DL (ref 31.4–37.4)
MCV RBC AUTO: 90 FL (ref 82–98)
MONOCYTES # BLD AUTO: 1.12 THOUSAND/ΜL (ref 0.17–1.22)
MONOCYTES NFR BLD AUTO: 9 % (ref 4–12)
MV E'TISSUE VEL-SEP: 9 CM/S
MV PEAK A VEL: 0.96 M/S
MV PEAK E VEL: 68 CM/S
MV STENOSIS PRESSURE HALF TIME: 90 MS
MV VALVE AREA P 1/2 METHOD: 2.44 CM2
NEUTROPHILS # BLD AUTO: 6.73 THOUSANDS/ΜL (ref 1.85–7.62)
NEUTS SEG NFR BLD AUTO: 56 % (ref 43–75)
NRBC BLD AUTO-RTO: 0 /100 WBCS
PLATELET # BLD AUTO: 312 THOUSANDS/UL (ref 149–390)
PMV BLD AUTO: 9.2 FL (ref 8.9–12.7)
POTASSIUM SERPL-SCNC: 4.2 MMOL/L (ref 3.5–5.3)
RBC # BLD AUTO: 5.37 MILLION/UL (ref 3.88–5.62)
RIGHT ATRIUM AREA SYSTOLE A4C: 12.2 CM2
RIGHT VENTRICLE ID DIMENSION: 4 CM
SL CV LEFT ATRIUM LENGTH A2C: 4.7 CM
SL CV LV EF: 65
SL CV PED ECHO LEFT VENTRICLE DIASTOLIC VOLUME (MOD BIPLANE) 2D: 85 ML
SL CV PED ECHO LEFT VENTRICLE SYSTOLIC VOLUME (MOD BIPLANE) 2D: 30 ML
SODIUM SERPL-SCNC: 137 MMOL/L (ref 135–147)
TR MAX PG: 21 MMHG
TR PEAK VELOCITY: 2.3 M/S
TRICUSPID ANNULAR PLANE SYSTOLIC EXCURSION: 2.1 CM
TRICUSPID VALVE PEAK REGURGITATION VELOCITY: 2.27 M/S
TRIGL SERPL-MCNC: 135 MG/DL
WBC # BLD AUTO: 11.92 THOUSAND/UL (ref 4.31–10.16)

## 2023-05-01 RX ORDER — HYDROCHLOROTHIAZIDE 12.5 MG/1
12.5 TABLET ORAL DAILY
Status: DISCONTINUED | OUTPATIENT
Start: 2023-05-01 | End: 2023-05-01 | Stop reason: HOSPADM

## 2023-05-01 RX ORDER — AMLODIPINE BESYLATE 10 MG/1
10 TABLET ORAL DAILY
Status: DISCONTINUED | OUTPATIENT
Start: 2023-05-01 | End: 2023-05-01 | Stop reason: HOSPADM

## 2023-05-01 RX ORDER — ASPIRIN 81 MG/1
81 TABLET, CHEWABLE ORAL DAILY
Qty: 30 TABLET | Refills: 0 | Status: SHIPPED | OUTPATIENT
Start: 2023-05-02 | End: 2023-06-01

## 2023-05-01 RX ORDER — NICOTINE 21 MG/24HR
1 PATCH, TRANSDERMAL 24 HOURS TRANSDERMAL DAILY
Qty: 28 PATCH | Refills: 0 | Status: SHIPPED | OUTPATIENT
Start: 2023-05-02

## 2023-05-01 RX ADMIN — CYANOCOBALAMIN TAB 500 MCG 1000 MCG: 500 TAB at 08:27

## 2023-05-01 RX ADMIN — ENOXAPARIN SODIUM 40 MG: 40 INJECTION SUBCUTANEOUS at 08:26

## 2023-05-01 RX ADMIN — B-COMPLEX W/ C & FOLIC ACID TAB 1 TABLET: TAB at 08:26

## 2023-05-01 RX ADMIN — FENOFIBRATE 145 MG: 145 TABLET ORAL at 08:26

## 2023-05-01 RX ADMIN — Medication 1000 UNITS: at 08:25

## 2023-05-01 RX ADMIN — ASPIRIN 81 MG: 81 TABLET, CHEWABLE ORAL at 08:25

## 2023-05-01 NOTE — DISCHARGE SUMMARY
Medical Problems     Resolved Problems  Date Reviewed: 5/1/2023   None       Discharging Resident:   Discharging Attending: No att  providers found  PCP: Ezekiel Brown DO  Admission Date:   Admission Orders (From admission, onward)     Ordered        04/30/23 1648  Place in Observation  Once                      Discharge Date: 05/01/23    Consultations During Hospital Stay:  · Neurology    Procedures Performed:   · ECHO   · MRI brain wo contrast  · CTA head and neck wo contrast    Significant Findings / Test Results:   · ECHO: There is a patent foramen ovale confirmed at rest and with provocation (cough and abdominal compression) with predominant right to left shunting using saline contrast   · MRI brain: Recent/subacute appearing lacunar infarcts in the right frontal lobe as well as right periventricular white matter  Otherwise no evidence for a large acute vascular distribution infarct and no acute intracranial hemorrhage  Mild chronic microvascular ischemic change  · CTA head and neck: No mass effect, acute intracranial hemorrhage or CT evidence for a large acute vascular distribution infarct  No evidence for high-grade stenosis, focal occlusion or vascular aneurysm of the cervical or intracranial vasculature  Atherosclerotic plaque at the carotid bulbs without evidence for high-grade stenosis or focal occlusion of the internal carotid arteries  Incidental Findings: N/A    Test Results Pending at Discharge (will require follow up): N/A     Outpatient Tests Requested: N/A    Complications:  N/A    Reason for Admission: TIA    Hospital Course:   Brigid Garcia is a 78 y o  male patient who originally presented to the hospital on 4/30/2023 due to an episode of LUE weakness/numbness and word finding difficulty while driving  Symptoms resolved within one minute  Patient was driving at wife with the time   Wife reports that she did not notice any facial drooping, drooling, tonic-clonic movements at this "time  Patient denies any bowel/bladder incontinence or confusion  He arrived to ED within 20 min of symptom onset  A stroke alert was called  However, TNK was not given as symptoms had resolved at that time  He denies any similar symptoms in the past      He had a CT of the head done which was negative for any acute intracranial normalities, twelve-lead EKG was negative for any acute ischemia  Other lab work was within normal limit except for mild leukocytosis  Patient was admitted for stroke r/o  MRI brain showed recent/subacute appearing lacunar infarcts in the right frontal lobe as well as right periventricular white matter and mild chronic microvascular ischemic change  The patient was placed on telemetry, which showed no overnight events  Pt was started on aspirin and statin  Home antihypertensive medications were held for permissive hypertension  Home metformin was held and patient was started on low-dose sliding scale insulin with Accu-Chek  Echo showed a patent foramen ovale confirmed at rest and with provocation (cough and abdominal compression) with predominant right to left shunting using saline contrast     Patient was counseled on smoking cessation  F/U with Cardiology and Neurology as an outpatient  The patient, initially admitted to the hospital as inpatient, was discharged earlier than expected given the following: No further continuation of stroke symptoms  Please see above list of diagnoses and related plan for additional information  Condition at Discharge: good    Discharge Day Visit / Exam:   Subjective:  Patient is resting comfortably in bed  He has no complaints  He is in no acute distress       Vitals: Blood Pressure: 132/79 (05/01/23 1120)  Pulse: 75 (05/01/23 1120)  Temperature: 98 °F (36 7 °C) (05/01/23 1120)  Temp Source: Oral (05/01/23 0030)  Respirations: 16 (05/01/23 0030)  Height: 5' 9\" (175 3 cm) (05/01/23 0900)  Weight - Scale: 81 6 kg (180 lb) (05/01/23 " 0900)  SpO2: 96 % (05/01/23 1120)    Physical Exam  Vitals and nursing note reviewed  Constitutional:       General: He is not in acute distress  Appearance: He is well-developed  HENT:      Head: Normocephalic and atraumatic  Eyes:      Conjunctiva/sclera: Conjunctivae normal    Cardiovascular:      Rate and Rhythm: Normal rate and regular rhythm  Heart sounds: No murmur heard  Pulmonary:      Effort: Pulmonary effort is normal  No respiratory distress  Breath sounds: Normal breath sounds  Abdominal:      Palpations: Abdomen is soft  Tenderness: There is no abdominal tenderness  Musculoskeletal:         General: No swelling  Cervical back: Neck supple  Skin:     General: Skin is warm and dry  Capillary Refill: Capillary refill takes less than 2 seconds  Neurological:      Mental Status: He is alert  Psychiatric:         Mood and Affect: Mood normal         Discussion with Family: Updated  (wife) at bedside  Discharge instructions/Information to patient and family:   See after visit summary for information provided to patient and family  Provisions for Follow-Up Care:  See after visit summary for information related to follow-up care and any pertinent home health orders  Disposition:   Home    Planned Readmission: N/A    Discharge Medications:  See after visit summary for reconciled discharge medications provided to patient and/or family        **Please Note: This note may have been constructed using a voice recognition system**

## 2023-05-01 NOTE — OCCUPATIONAL THERAPY NOTE
Occupational Therapy Screen     Patient Name: Carline Sexton  QZDCY'Y Date: 5/1/2023  Problem List  Principal Problem:    TIA (transient ischemic attack)  Active Problems:    Dyslipidemia    Essential hypertension    Diabetes mellitus type 2    Chronic obstructive pulmonary disease, unspecified COPD type ([facility] )    Leukocytosis    Past Medical History  Past Medical History:   Diagnosis Date    Adenocarcinoma of prostate ([facility] )     [id number]  LAST ASSESSED    Diabetes mellitus ([facility] )     Hypertension      Past Surgical History  Past Surgical History:   Procedure Laterality Date    COLON SURGERY      HERNIA REPAIR      SHOULDER SURGERY      TONSILLECTOMY      TONSILLECTOMY AND ADENOIDECTOMY          05/01/23 1114   OT Last Visit   OT Visit Date 05/01/23   Note Type   Note type Screen   Additional Comments OT orders received and chart review performed  Pt admitted with stroke-like symptoms  Spoke to [address] [place] [address] who reports pt is (I) with ADLs/mobility in room/hallway with no AD and reports all symptoms have resolved  As pt appears to be at functional baseline, pt will be DC from IPOT caseload  Please reconsult if functional status changes       SEMAJ Bliss, OTR/L  [address] [facility] [employer] License [id number]  [address] [place] [address] [phone]

## 2023-05-01 NOTE — SPEECH THERAPY NOTE
Speech Language/Pathology  Speech/Language Pathology Stroke Screen     Patient Name: Rohan Reese  LITLV'F Date: 5/1/2023     Problem List  Principal Problem:    TIA (transient ischemic attack)  Active Problems:    Dyslipidemia    Essential hypertension    Diabetes mellitus type 2    Chronic obstructive pulmonary disease, unspecified COPD type ([facility] )    Past Medical History  Past Medical History:   Diagnosis Date    Adenocarcinoma of prostate ([facility] )     [id number]  LAST ASSESSED    Diabetes mellitus ([facility] )     Hypertension        Rohan Reese is a 78 y o  male with PMHx significant for COPD, HTN, HLD,  who presents with c/c of Left sided weakness and numbness in his arm and word findingg difficulty which just lasted 30 seconds or less while he was driving at around 1 pm  He then came to the ED for further evaluation  Patient's wife was on the nonpassenger seat and patient was initially intending to pull over but he felt better and back to normal by that time  She did not notice any drooping of his face or saliva  Denied any other symptoms  Denies any similar symptoms in the past   Patient felt fine even before the incident and after that  Consult received for speech/swallow eval on stroke pathway  Pt passed nsg swallow screen; tolerating regular diet w/o s/s dysphagia or aspiration  No speech/language deficits reported  NIH score is 0  MRI: 4/30/23  Recent/subacute appearing lacunar infarcts in the right frontal lobe as well as right periventricular white matter  Otherwise no evidence for a large acute vascular distribution infarct and no acute intracranial hemorrhage  No need for formal speech/swallow eval at this time  Reconsult if needed      April Laurita Mistry CCC-SLP  Speech Pathologist  Available via  tiger text

## 2023-05-01 NOTE — CASE MANAGEMENT
Case Management Assessment & Discharge Planning Note    Patient name Serge Larsen  Location S /S -01 MRN [id number]  : 1955 Date 2023       Current Admission Date: 2023  Current Admission Diagnosis:Stroke, lacunar [facility])   Patient Active Problem List    Diagnosis Date Noted    Stroke, lacunar ([facility] ) 2023    TIA (transient ischemic attack) 2023    Increased prostate specific antigen (PSA) velocity 10/20/2022    Chronic low back pain 2022    Neck pain 2022    Wellness examination 2021    Screening for HIV (human immunodeficiency virus) 2021    Diarrhea 2021    Leukocytosis 01/10/2021    Tobacco abuse 2021    Chronic obstructive pulmonary disease, unspecified COPD type ([facility] ) 2021    Tinea pedis of left foot 2019    Essential hypertension 2019    Hypertriglyceridemia 2019    Diabetes mellitus type 2 2018    Need for 23-polyvalent pneumococcal polysaccharide vaccine 2018    Overweight (BMI 25 0-29 9) 2018    Diabetes 1 5, 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    Seasonal allergic rhinitis 2018    Encounter for hepatitis C screening test for low risk patient 2018    Dyslipidemia 2013    Essential hypertension 2012      LOS (days): 0  Geometric Mean LOS (GMLOS) (days):   Days to GMLOS:     OBJECTIVE:              Current admission status: Observation       Preferred Pharmacy:   [id number] [address] [address]  Phone: [phone] Fax: [phone]    [id number] - [address]  [address] [address]  Phone: [phone] Fax: [address], [address]  [address]  [facility] [address]  Phone: [phone] Fax: [phone]    Primary Care Provider: Sophy Pena, DO    Primary Insurance: MEDICARE  Secondary Insurance: BLUE CROSS    ASSESSMENT:  Active Health Care Proxies     pushpa pratt Health Care Representative - Spouse   Primary Phone: [phone] (Mobile)                         Readmission Root Cause  30 Day Readmission: No    Patient Information  Admitted from[de-identified] Home  Mental Status: Alert  During Assessment patient was accompanied by: Not accompanied during assessment  Assessment information provided by[de-identified] Patient  Primary Caregiver: Self  Support Systems: Spouse/significant other  [place] of Residence: [address] [place] [address] do you live in?: [address] entry access options  Select all that apply : Stairs  Number of steps to enter home  : 1  Do the steps have railings?: No  Type of Current Residence: [address]  In the last 12 months, was there a time when you were not able to pay the mortgage or rent on time?: No  In the last 12 months, how many places have you lived?: 1  In the last 12 months, was there a time when you did not have a steady place to sleep or slept in a shelter (including now)?: No  Homeless/housing insecurity resource given?: N/A  Living Arrangements: Lives w/ Spouse/significant other  Is patient a ?: No    Activities of Daily Living Prior to Admission  Functional Status: Independent  Completes ADLs independently?: Yes  Ambulates independently?: Yes  Does patient use assisted devices?: No  Does patient currently own DME?: No  Does patient have a history of Outpatient Therapy (PT/OT)?: No  Does the patient have a history of Short-Term Rehab?: No  Does patient have a history of HHC?: No  Does patient currently have [facility]?: No         Patient Information Continued  Income Source: SSI/SSD  Does patient have prescription coverage?: Yes  Within the past 12 months, you worried that your food would run out before you got the money to buy more : Never true  Within the past 12 months, the food you bought just didn't last and you didn't have money to get more : Never true  Food insecurity resource given?: N/A  Does patient receive dialysis treatments?: No  Does patient have a history of substance abuse?: No  Does patient have a history of Mental Health Diagnosis?: No    PHQ 2/9 Screening   Reviewed PHQ 2/9 Depression Screening Score?: No    Means of Transportation  Means of Transport to Appts[de-identified] Drives Self  In the past 12 months, has lack of transportation kept you from medical appointments or from getting medications?: No  In the past 12 months, has lack of transportation kept you from meetings, work, or from getting things needed for daily living?: No  Was application for public transport provided?: N/A        DISCHARGE DETAILS:    Discharge planning discussed with[de-identified] Patient  Freedom of Choice: Yes         [address]         Is the patient interested in [facility] at discharge?: No    DME Referral Provided  Referral made for DME?: No      Treatment Team Recommendation: Home  Discharge Destination Plan[de-identified] Home  Transport at Discharge : Family

## 2023-05-01 NOTE — ASSESSMENT & PLAN NOTE
Lab Results   Component Value Date    HGBA1C 7 2 (A) 10/19/2022       Recent Labs     04/30/23  1843 04/30/23  1951 04/30/23  2317 05/01/23  0750   POCGLU 177* 180* 128 106       Blood Sugar Average: Last 72 hrs:  (P) 145 2   Hold metformin  Start patient on low-dose sliding scale insulin with Accu-Chek 4 times daily

## 2023-05-01 NOTE — PROGRESS NOTES
[facility]  Progress Note  Name: Jenn Patrick  MRN: [id number]  Unit/Bed#: S -90 I Date of Admission: 4/30/2023   Date of Service: 5/1/2023 I Hospital Day: 0    Assessment/Plan   * TIA (transient ischemic attack)  Assessment & Plan  CT Head -ve for Acute CVA  MRI brain showed recent/subacute appearing lacunar infarcts in the right frontal lobe as well as right periventricular white matter  Otherwise negative for Acute CVA  Mild chronic microvascular ischemic change  5/1 No overnight events on telemetry    Plan: We will place the patient under observation to telemetry unit  ECHO pending   Check Neurocheck q4h, Consult Neurology  Continue patient on aspirin and statin  Consult PT/OT  NIH stroke Scale = 0    Essential hypertension  Assessment & Plan  Holding antihypertensive medications including home regimen of lisinopril, amlodipine, hydrochlorothiazide for permissive hypertension  Diabetes mellitus type 2  Assessment & Plan  Lab Results   Component Value Date    HGBA1C 7 2 (A) 10/19/2022       Recent Labs     04/30/23  1843 04/30/23  1951 04/30/23  2317 05/01/23  0750   POCGLU 177* 180* 128 106       Blood Sugar Average: Last 72 hrs:  (P) 145 2   Hold metformin  Start patient on low-dose sliding scale insulin with Accu-Chek 4 times daily  Leukocytosis  Assessment & Plan  Lab Results   Component Value Date    WBC 11 92 (H) 05/01/2023    WBC 11 75 (H) 04/30/2023       Chronic obstructive pulmonary disease, unspecified COPD type ([facility] )  Assessment & Plan  Continue patient on albuterol inhalers as needed  Patient is active smoker, counseled on smoking cessation  Nicotine patch offered  Dyslipidemia  Assessment & Plan  Continue patient on statin, continue on Tricor  VTE Pharmacologic Prophylaxis: VTE Score: 3 Moderate Risk (Score 3-4) - Pharmacological DVT Prophylaxis Ordered: enoxaparin (Lovenox)      Patient Centered Rounds: {Pt Centered Care Rounds:19630}  Discussions with Specialists or Other Care Team Provider: ***    Education and Discussions with Family / Patient: {Family Communication:17395}    Current Length of Stay: 0 day(s)  Current Patient Status: Observation   Discharge Plan: Anticipate discharge later today or tomorrow to home  Code Status: Level 1 - Full Code    Subjective:   Gisela Bhakta is a pleasant 78 y o man who is resting in bed comfortably  Today, he denies any symptoms of headache, lightheadedness, vision changes, confusion weakness, numbness, difficulty speaking, chest pain, shortness of breath, abdominal pain, nausea, or vomiting  Objective:     Vitals:   Temp (24hrs), Av 5 °F (36 9 °C), Min:98 2 °F (36 8 °C), Max:98 8 °F (37 1 °C)    Temp:  [98 2 °F (36 8 °C)-98 8 °F (37 1 °C)] 98 5 °F (36 9 °C)  HR:  [77-91] 77  Resp:  [16-18] 16  BP: (122-176)/(67-99) 134/79  SpO2:  [93 %-98 %] 93 %  Body mass index is 26 7 kg/m²  Input and Output Summary (last 24 hours): Intake/Output Summary (Last 24 hours) at 2023 0857  Last data filed at 2023 1605  Gross per 24 hour   Intake 500 ml   Output --   Net 500 ml       Physical Exam:   Physical Exam  Vitals and nursing note reviewed  Constitutional:       General: He is not in acute distress  Appearance: He is well-developed  HENT:      Head: Normocephalic and atraumatic  Eyes:      Conjunctiva/sclera: Conjunctivae normal    Cardiovascular:      Rate and Rhythm: Normal rate and regular rhythm  Heart sounds: No murmur heard  Pulmonary:      Effort: Pulmonary effort is normal  No respiratory distress  Breath sounds: Normal breath sounds  Abdominal:      Palpations: Abdomen is soft  Tenderness: There is no abdominal tenderness  Musculoskeletal:         General: No swelling  Cervical back: Neck supple  Skin:     General: Skin is warm and dry  Capillary Refill: Capillary refill takes less than 2 seconds     Neurological:      Mental Status: He is alert  Psychiatric:         Mood and Affect: Mood normal        Additional Data:     Labs:  Results from last 7 days   Lab Units 05/01/23  0619   WBC Thousand/uL 11 92*   HEMOGLOBIN g/dL 15 3   HEMATOCRIT % 48 4   PLATELETS Thousands/uL 312   NEUTROS PCT % 56   LYMPHS PCT % 32   MONOS PCT % 9   EOS PCT % 2     Results from last 7 days   Lab Units 05/01/23  0619 04/30/23  1419   SODIUM mmol/L 137 137   POTASSIUM mmol/L 4 2 3 8   CHLORIDE mmol/L 105 104   CO2 mmol/L 25 25   BUN mg/dL 19 27*   CREATININE mg/dL 0 78 1 19   ANION GAP mmol/L 7 8   CALCIUM mg/dL 9 6 10 4*   ALBUMIN g/dL  --  4 6   TOTAL BILIRUBIN mg/dL  --  0 43   ALK PHOS U/L  --  80   ALT U/L  --  17   AST U/L  --  15   GLUCOSE RANDOM mg/dL 123 141*     Results from last 7 days   Lab Units 04/30/23  1419   INR  0 93     Results from last 7 days   Lab Units 05/01/23  0750 04/30/23  2317 04/30/23  1951 04/30/23  1843 04/30/23  1411   POC GLUCOSE mg/dl 106 128 180* 177* 135               Lines/Drains:  Invasive Devices     Peripheral Intravenous Line  Duration           Peripheral IV 04/30/23 Right Antecubital <1 day              Telemetry:  Telemetry Orders (From admission, onward)             48 Hour Telemetry Monitoring  Continuous x 48 hours        References:    Telemetry Guidelines   Question:  Reason for 48 Hour Telemetry  Answer:  Acute CVA (<24 hrs old, hemispheric strokes, selected brainstem strokes, cardiac arrhythmias)                 Telemetry Reviewed: Normal Sinus Rhythm  Indication for Continued Telemetry Use: No indication for continued use  Will discontinue  Imaging: Reviewed radiology reports from this admission including: CT head and MRI brain    MRI brain wo contrast   Final Result by Donald Durham MD (04/30 1951)      Recent/subacute appearing lacunar infarcts in the right frontal lobe as well as right periventricular white matter   Otherwise no evidence for a large acute vascular distribution infarct and no acute intracranial hemorrhage  Mild chronic microvascular ischemic change  The study was marked in [facility] for immediate notification  Workstation performed: [id number]         CTA head and neck with and without contrast   Final Result by Leilani Kennedy MD (04/30 1620)      No mass effect, acute intracranial hemorrhage or CT evidence for a large acute vascular distribution infarct  No evidence for high-grade stenosis, focal occlusion or vascular aneurysm of the cervical or intracranial vasculature  Atherosclerotic plaque at the carotid bulbs without evidence for high-grade stenosis or focal occlusion of the internal carotid arteries              Workstation performed: [id number]           Recent Cultures (last 7 days):         Last 24 Hours Medication List:   Current Facility-Administered Medications   Medication Dose Route Frequency Provider Last Rate    acetaminophen  650 mg Oral Once PRN Prashanth Plaza MD      acetaminophen  650 mg Oral Q4H PRN Nicola Hi MD      albuterol  1 puff Inhalation Q4H PRN Almaz Clemente MD      aspirin  81 mg Oral Daily Almaz Clemente MD      atorvastatin  80 mg Oral Daily With Echo Rasmussen MD      benzonatate  200 mg Oral TID PRN Almaz Clemente MD      cholecalciferol  1,000 Units Oral Daily Almaz Clemente MD      vitamin B-12  1,000 mcg Oral Daily Almaz Clemente MD      enoxaparin  40 mg Subcutaneous Daily Almaz Clemente MD      fenofibrate  145 mg Oral Daily Almaz Clemente MD      fluticasone  2 spray Nasal Daily Almaz Clemente MD      insulin lispro  1-5 Units Subcutaneous TID AC Almaz Clemente MD      insulin lispro  1-5 Units Subcutaneous HS Almaz Clemente MD      loratadine  5 mg Oral Daily Almaz Clemente MD      LORazepam  1 mg Intravenous On Call Almaz Clemente MD      multivitamin stress formula  1 tablet Oral Daily MD Heidi Iyer nicotine  1 patch Transdermal Daily Sarah Dugan MD      ondansetron  4 mg Oral Once PRN Misha Alcantara MD          Today, Patient Was Seen By: Ed Minaya    **Please Note: This note may have been constructed using a voice recognition system  **

## 2023-05-01 NOTE — ASSESSMENT & PLAN NOTE
77 yo male w/ PMH of COPD, HTN, HLD who presented w/ LUE weakness/numbness and word finding difficulty  Symptoms resolved within about one minute  Pt brought to ED within 20 min and stroke alert was called  TNK was not given as symptoms had resolved  POA NIH score 0  CT Head unremarkable  POA Lipid panel shows cholesterol 112, HDL 32 and LDL 53  POA a1c was 7 2  MRI showed recent/subacute appearing lacunar infarcts in the right frontal lobe as well as right periventricular white matter  Pt placed on ASA 81 mg qd and home Lipitor was increased to 80 mg qd  No focal neurological deficits noted on exam, no dysarthria or word finding difficulty noted  2-D echo pending      Plan:  Continue stroke pathway  Frequent neuro checks per unit protocol  Continue ASA 81 mg qd indefinitely, for stroke prevention, DAPT not recommended at this time  Decrease Lipitor back down to home dose of 10 mg qd, due to low LDL  Goal choleserol >120 and LDL >70  Goal SBP >140  2-D echo pending  Can d/c today from neuro standpoint if 2-D echo unremarkable  Outpatient Neuro f/u in 4weeks  Stroke education  Encourage smoking cessation  Remainder of care per primary team

## 2023-05-01 NOTE — INCIDENTAL FINDINGS
The following findings require follow up:  Non-Radiographic finding      Finding: Echocardiogram, 05/01/2023: Atrial Septum: There is a patent foramen ovale confirmed at rest and with provocation (cough and abdominal compression) with predominant right to left shunting using saline contrast         Follow up required: Yes   Follow up should be done within 2-4 week(s)    Please notify the following clinician to assist with the follow up:   Primary care physician Dr Seth Cardenas, DO              Ambulatory referral to cardiology

## 2023-05-01 NOTE — ASSESSMENT & PLAN NOTE
Continue patient on statin, continue on Tricor    Decrease Lipitor back down to home dose of 10 mg qd, due to low LDL  Goal choleserol >120 and LDL >70

## 2023-05-01 NOTE — ASSESSMENT & PLAN NOTE
Holding antihypertensive medications including home regimen of lisinopril, amlodipine, hydrochlorothiazide for permissive hypertension    Goal SBP > 140

## 2023-05-01 NOTE — ASSESSMENT & PLAN NOTE
60-year-old man with hypertension, hyperlipidemia, current tobacco use, and COPD who presented with about 20 minutes of left-sided sensorimotor symptoms and word finding difficulty  CTA demonstrated some scattered atherosclerotic disease however no clear hemodynamically significant stenosis  MRI showed recent/subacute appearing lacunar infarcts in the right frontal lobe as well as right periventricular white matter  Echocardiogram showed LVEF 65% and a patent foramen ovale  Plan:  Continue Aspirin 81 mg daily, Lipitor 10mg od

## 2023-05-01 NOTE — PLAN OF CARE
Problem: NEUROSENSORY - ADULT  Goal: Achieves stable or improved neurological status  Description: INTERVENTIONS  - Monitor and report changes in neurological status  - Monitor vital signs such as temperature, blood pressure, glucose, and any other labs ordered   - Initiate measures to prevent increased intracranial pressure  - Monitor for seizure activity and implement precautions if appropriate      Outcome: Progressing  Goal: Achieves maximal functionality and self care  Description: INTERVENTIONS  - Monitor swallowing and airway patency with patient fatigue and changes in neurological status  - Encourage and assist patient to increase activity and self care     - Encourage visually impaired, hearing impaired and aphasic patients to use assistive/communication devices  Outcome: Progressing     Problem: PAIN - ADULT  Goal: Verbalizes/displays adequate comfort level or baseline comfort level  Description: Interventions:  - Encourage patient to monitor pain and request assistance  - Assess pain using appropriate pain scale  - Administer analgesics based on type and severity of pain and evaluate response  - Implement non-pharmacological measures as appropriate and evaluate response  - Consider cultural and social influences on pain and pain management  - Notify physician/advanced practitioner if interventions unsuccessful or patient reports new pain  Outcome: Progressing     Problem: INFECTION - ADULT  Goal: Absence or prevention of progression during hospitalization  Description: INTERVENTIONS:  - Assess and monitor for signs and symptoms of infection  - Monitor lab/diagnostic results  - Monitor all insertion sites, i e  indwelling lines, tubes, and drains  - Monitor endotracheal if appropriate and nasal secretions for changes in amount and color  - [place] appropriate cooling/warming therapies per order  - Administer medications as ordered  - Instruct and encourage patient and family to use good hand hygiene technique  - Identify and instruct in appropriate isolation precautions for identified infection/condition  Outcome: Progressing  Goal: Absence of fever/infection during neutropenic period  Description: INTERVENTIONS:  - Monitor WBC    Outcome: Progressing     Problem: SAFETY ADULT  Goal: Patient will remain free of falls  Description: INTERVENTIONS:  - Educate patient/family on patient safety including physical limitations  - Instruct patient to call for assistance with activity   - Consult OT/PT to assist with strengthening/mobility   - Keep Call bell within reach  - Keep bed low and locked with side rails adjusted as appropriate  - Keep care items and personal belongings within reach  - Initiate and maintain comfort rounds  - Make Fall Risk Sign visible to staff  - Apply yellow socks and bracelet for high fall risk patients  - Consider moving patient to room near nurses station  Outcome: Progressing  Goal: Maintain or return to baseline ADL function  Description: INTERVENTIONS:  -  Assess patient's ability to carry out ADLs; assess patient's baseline for ADL function and identify physical deficits which impact ability to perform ADLs (bathing, care of mouth/teeth, toileting, grooming, dressing, etc )  - Assess/evaluate cause of self-care deficits   - Assess range of motion  - Assess patient's mobility; develop plan if impaired  - Assess patient's need for assistive devices and provide as appropriate  - Encourage maximum independence but intervene and supervise when necessary  - Involve family in performance of ADLs  - Assess for home care needs following discharge   - Consider OT consult to assist with ADL evaluation and planning for discharge  - Provide patient education as appropriate  Outcome: Progressing  Goal: Maintains/Returns to pre admission functional level  Description: INTERVENTIONS:  - Perform BMAT or MOVE assessment daily    - Set and communicate daily mobility goal to care team and patient/family/caregiver  - Collaborate with rehabilitation services on mobility goals if consulted  - Perform Range of Motion  - Out of bed for toileting  - Record patient progress and toleration of activity level   Outcome: Progressing     Problem: DISCHARGE PLANNING  Goal: Discharge to home or other facility with appropriate resources  Description: INTERVENTIONS:  - Identify barriers to discharge w/patient and caregiver  - Arrange for needed discharge resources and transportation as appropriate  - Identify discharge learning needs (meds, wound care, etc )  - Arrange for interpretive services to assist at discharge as needed  - Refer to Case Management Department for coordinating discharge planning if the patient needs post-hospital services based on physician/advanced practitioner order or complex needs related to functional status, cognitive ability, or social support system  Outcome: Progressing     Problem: Knowledge Deficit  Goal: Patient/family/caregiver demonstrates understanding of disease process, treatment plan, medications, and discharge instructions  Description: Complete learning assessment and assess knowledge base    Interventions:  - Provide teaching at level of understanding  - Provide teaching via preferred learning methods  Outcome: Progressing

## 2023-05-01 NOTE — DISCHARGE INSTR - AVS FIRST PAGE
Dear Lola Pennington,     It was our pleasure to care for you here at [facility]  It is our hope that we were always able to exceed the expected standards for your care during your stay  You were hospitalized due to left-sided weakness and numbness concerning for TIA or stroke  You were cared for on the third floor by Meghana Das MD under the service of Eleazar Viera MD with the [facility] who covers for your primary care physician (PCP), Daniela Carl DO, while you were hospitalized  If you have any questions or concerns related to this hospitalization, you may contact us at [phone]  For follow up as well as any medication refills, we recommend that you follow up with your primary care physician  A registered nurse will reach out to you by phone within a few days after your discharge to answer any additional questions that you may have after going home  However, at this time we provide for you here, the most important instructions / recommendations at discharge:     Notable Medication Adjustments -   Please start taking aspirin 81 mg daily  Please continue your home medications including Lipitor 10 mg daily, amlodipine 10 mg daily, hydrochlorothiazide 12 5 mg daily, metformin 1000 mg twice a day with meals  Testing Required after Discharge -   none  ** Please contact your PCP to request testing orders for any of the testing recommended here **  Important follow up information -   Please follow-up with your primary care physician within 1 week  Please follow-up with the neurologist as an outpatient within 4 weeks  Ambulatory referral to cardiology for further evaluation and management of PFO  Other Instructions -   Strongly recommended for smoking cessation  Ambulatory referral to smoking cessation programs    Please review this entire after visit summary as additional general instructions including medication list, appointments, activity, diet, any pertinent wound care, and other additional recommendations from your care team that may be provided for you        Sincerely,     Araceli French MD

## 2023-05-01 NOTE — DISCHARGE SUMMARY
[facility]  Discharge- Rene Rosalia 1955, 78 y o  male MRN: [id number]  Unit/Bed#: S -01 Encounter: [id number]  Primary Care Provider: Ruby Abrams DO   Date and time admitted to hospital: 4/30/2023  2:01 PM    * Stroke, lacunar [facility])  Assessment & Plan  79-year-old man with hypertension, hyperlipidemia, current tobacco use, and COPD who presented with about 20 minutes of left-sided sensorimotor symptoms and word finding difficulty  CTA demonstrated some scattered atherosclerotic disease however no clear hemodynamically significant stenosis  MRI showed recent/subacute appearing lacunar infarcts in the right frontal lobe as well as right periventricular white matter  Echocardiogram showed LVEF 65% and a patent foramen ovale  Plan:  Continue Aspirin 81 mg daily, Lipitor 10mg od  TIA (transient ischemic attack)  Assessment & Plan  CT Head -ve for Acute CVA  MRI brain showed recent/subacute appearing lacunar infarcts in the right frontal lobe as well as right periventricular white matter  Otherwise negative for Acute CVA  Mild chronic microvascular ischemic change  5/1 No overnight events on telemetry    Echo: There is a patent foramen ovale confirmed at rest and with provocation (cough and abdominal compression) with predominant right to left shunting using saline contrast     Plan:   D/C telemetry  Check Neurocheck q4h  Neurology consulting  Continue ASA 81 mg qd indefinitely, for stroke prevention, DAPT not recommended at this time  Per Neurology, can d/c today from neuro standpoint if 2-D echo unremarkable  Outpatient Neuro f/u in 4weeks  Stroke education    Consult PT/OT  NIH stroke Scale = 0    Leukocytosis  Assessment & Plan  Lab Results   Component Value Date    WBC 11 92 (H) 05/01/2023    WBC 11 75 (H) 04/30/2023       Does not meet SIRS criteria    Most likely reactive leukocytosis in the setting of lacunar stroke        Chronic obstructive pulmonary disease, unspecified COPD type ([facility] [address] )  Assessment & Plan  Continue patient on albuterol inhalers as needed  Patient is active smoker, counseled on smoking cessation  Nicotine patch offered  Diabetes mellitus type 2  Assessment & Plan  Lab Results   Component Value Date    HGBA1C 7 2 (A) 10/19/2022       Recent Labs     04/30/23  1843 04/30/23  1951 04/30/23  2317 05/01/23  0750   POCGLU 177* 180* 128 106       Blood Sugar Average: Last 72 hrs:  (P) 145 2   Hold metformin  Start patient on low-dose sliding scale insulin with Accu-Chek 4 times daily  Essential hypertension  Assessment & Plan  Holding antihypertensive medications including home regimen of lisinopril, amlodipine, hydrochlorothiazide for permissive hypertension  Goal SBP > 140    Dyslipidemia  Assessment & Plan  Continue patient on statin, continue on Tricor  Decrease Lipitor back down to home dose of 10 mg qd, due to low LDL  Goal choleserol >120 and LDL >70        Medical Problems      Resolved Problems  Date Reviewed: 5/1/2023   None         Discharging Resident: Meghana Macias MD  Discharging Attending: Federico Kelly MD  PCP: Lety Manning DO  Admission Date:       Admission Orders (From admission, onward)       Ordered         04/30/23 1648   Place in Observation  Once                         Discharge Date: 05/01/23     Consultations During Hospital Stay:  [facility] Neurology     Procedures Performed:    ECHO    MRI brain wo contrast   CTA head and neck wo contrast     Significant Findings / Test Results:    ECHO: There is a patent foramen ovale confirmed at rest and with provocation (cough and abdominal compression) with predominant right to left shunting using saline contrast    MRI brain: Recent/subacute appearing lacunar infarcts in the right frontal lobe as well as right periventricular white matter  Otherwise no evidence for a large acute vascular distribution infarct and no acute intracranial hemorrhage   Mild chronic microvascular ischemic change   CTA head and neck: No mass effect, acute intracranial hemorrhage or CT evidence for a large acute vascular distribution infarct  No evidence for high-grade stenosis, focal occlusion or vascular aneurysm of the cervical or intracranial vasculature  Atherosclerotic plaque at the carotid bulbs without evidence for high-grade stenosis or focal occlusion of the internal carotid arteries      Incidental Findings: N/A     Test Results Pending at Discharge (will require follow up): N/A     Outpatient Tests Requested: N/A     Complications:  N/A     Reason for Admission: TIA     Hospital Course:   Brigid Garcia is a 78 y o  male patient who originally presented to the hospital on 4/30/2023 due to an episode of LUE weakness/numbness and word finding difficulty while driving  Symptoms resolved within one minute  Patient was driving at wife with the time  Wife reports that she did not notice any facial drooping, drooling, tonic-clonic movements at this time  Patient denies any bowel/bladder incontinence or confusion  He arrived to ED within 20 min of symptom onset  A stroke alert was called  However, TNK was not given as symptoms had resolved at that time  He denies any similar symptoms in the past       He had a CT of the head done which was negative for any acute intracranial normalities, twelve-lead EKG was negative for any acute ischemia   Other lab work was within normal limit except for mild leukocytosis   Patient was admitted for stroke r/o  MRI brain showed recent/subacute appearing lacunar infarcts in the right frontal lobe as well as right periventricular white matter and mild chronic microvascular ischemic change      The patient was placed on telemetry, which showed no overnight events  Pt was started on aspirin and statin  Home antihypertensive medications were held for permissive hypertension   Home metformin was held and patient was started on low-dose sliding scale insulin "with Accu-Chek       Echo showed a patent foramen ovale confirmed at rest and with provocation (cough and abdominal compression) with predominant right to left shunting using saline contrast      Patient was counseled on smoking cessation       F/U with Cardiology and Neurology as an outpatient       The patient, initially admitted to the hospital as inpatient, was discharged earlier than expected given the following: No further continuation of stroke symptoms      Please see above list of diagnoses and related plan for additional information       Condition at Discharge: good     Discharge Day Visit / Exam:   Subjective:  Patient is resting comfortably in bed  He has no complaints  He is in no acute distress       Vitals: Blood Pressure: 132/79 (05/01/23 1120)  Pulse: 75 (05/01/23 1120)  Temperature: 98 °F (36 7 °C) (05/01/23 1120)  Temp Source: Oral (05/01/23 0030)  Respirations: 16 (05/01/23 0030)  Height: 5' 9\" (175 3 cm) (05/01/23 0900)  Weight - Scale: 81 6 kg (180 lb) (05/01/23 0900)  SpO2: 96 % (05/01/23 1120)     Physical Exam  Vitals and nursing note reviewed  Constitutional:       General: He is not in acute distress  Appearance: He is well-developed  HENT:      Head: Normocephalic and atraumatic  Eyes:      Conjunctiva/sclera: Conjunctivae normal    Cardiovascular:      Rate and Rhythm: Normal rate and regular rhythm  Heart sounds: No murmur heard  Pulmonary:      Effort: Pulmonary effort is normal  No respiratory distress  Breath sounds: Normal breath sounds  Abdominal:      Palpations: Abdomen is soft  Tenderness: There is no abdominal tenderness  Musculoskeletal:         General: No swelling  Cervical back: Neck supple  Skin:     General: Skin is warm and dry  Capillary Refill: Capillary refill takes less than 2 seconds  Neurological:      Mental Status: He is alert     Psychiatric:         Mood and Affect: Mood normal          Discussion with Family: Updated "  (wife) at bedside      Discharge instructions/Information to patient and family:   See after visit summary for information provided to patient and family        Provisions for Follow-Up Care:  See after visit summary for information related to follow-up care and any pertinent home health orders         Disposition:   Home     Planned Readmission: N/A     Discharge Medications:  See after visit summary for reconciled discharge medications provided to patient and/or family        **Please Note: This note may have been constructed using a voice recognition system**              Revision History

## 2023-05-01 NOTE — ASSESSMENT & PLAN NOTE
Lab Results   Component Value Date    WBC 11 92 (H) 05/01/2023    WBC 11 75 (H) 04/30/2023       Does not meet SIRS criteria    Most likely reactive leukocytosis in the setting of lacunar stroke

## 2023-05-01 NOTE — ASSESSMENT & PLAN NOTE
CT Head -ve for Acute CVA  MRI brain showed recent/subacute appearing lacunar infarcts in the right frontal lobe as well as right periventricular white matter  Otherwise negative for Acute CVA  Mild chronic microvascular ischemic change  5/1 No overnight events on telemetry    Echo: There is a patent foramen ovale confirmed at rest and with provocation (cough and abdominal compression) with predominant right to left shunting using saline contrast     Plan:   D/C telemetry  Check Neurocheck q4h  Neurology consulting  Continue ASA 81 mg qd indefinitely, for stroke prevention, DAPT not recommended at this time  Per Neurology, can d/c today from neuro standpoint if 2-D echo unremarkable  Outpatient Neuro f/u in 4weeks  Stroke education    Consult PT/OT       NIH stroke Scale = 0

## 2023-05-01 NOTE — CONSULTS
NEUROLOGY RESIDENCY CONSULT NOTE     Name: Carline Sexton   Age & Sex: 78 y o  male   MRN: [id number]  Unit/Bed#: S -01   Encounter: [id number]  Length of Stay: 0    ASSESSMENT & PLAN     * Stroke, lacunar [facility])  Assessment & Plan  77 yo male w/ PMH of COPD, HTN, HLD who presented w/ LUE weakness/numbness and word finding difficulty  Symptoms resolved within about one minute  Pt brought to ED within 20 min and stroke alert was called  TNK was not given as symptoms had resolved  POA NIH score 0  CT Head unremarkable  POA Lipid panel shows cholesterol 112, HDL 32 and LDL 53  POA a1c was 7 2  MRI showed recent/subacute appearing lacunar infarcts in the right frontal lobe as well as right periventricular white matter  Pt placed on ASA 81 mg qd and home Lipitor was increased to 80 mg qd  No focal neurological deficits noted on exam, no dysarthria or word finding difficulty noted  2-D echo pending  Plan:  Continue stroke pathway  Frequent neuro checks per unit protocol  Continue ASA 81 mg qd indefinitely, for stroke prevention, DAPT not recommended at this time  Decrease Lipitor back down to home dose of 10 mg qd, due to low LDL  Goal choleserol >120 and LDL >70  Goal SBP >140  2-D echo pending  Can d/c today from neuro standpoint if 2-D echo unremarkable  Outpatient Neuro f/u in 4weeks  Stroke education  Encourage smoking cessation  Remainder of care per primary team        Carline Sexton will need follow up in in 4 weeks with general or neurovascular Attending, Resident or AP  He will not require outpatient neurological testing  Pending for discharge: 2-D echo (if unremarkable pt can be d/c today)    SUBJECTIVE     Reason for Consult / Principal Problem: Stroke, lacunar  Hx and PE limited by: Pt undergoing echo at time of initial encounter    HPI: Carline Sexton is a 78 y o   male w/ PMH of COPD, HTN, HLD who presented w/ LUE weakness/numbness and word finding difficulty   Symptoms resolved within about one minute  Pt was driving at wife with the time  Wife reports that she did not notice any facial drooping, tonic-clonic movements at this time  Patient denies any bladder incontinence or confusion  Wife recommended pt come to the ED for evaluation and pt arrived to ED within 20 min of symptom onset  A stroke alert was called  However, TNK was not given as symptoms had resolved at that time   Pt denies any similar symptoms in the past      Consults    Historical Information   Past Medical History:   Diagnosis Date    Adenocarcinoma of prostate ([facility] )     [id number]  LAST ASSESSED    Diabetes mellitus ([facility] )     Hypertension      Past Surgical History:   Procedure Laterality Date    COLON SURGERY      HERNIA REPAIR      SHOULDER SURGERY      TONSILLECTOMY      TONSILLECTOMY AND ADENOIDECTOMY       Social History   Social History     Substance and Sexual Activity   Alcohol Use Not Currently     Social History     Substance and Sexual Activity   Drug Use No     E-Cigarette/Vaping    E-Cigarette Use Never User      E-Cigarette/Vaping Substances    Nicotine No     THC No     CBD No     Flavoring No     Other No     Unknown No      Social History     Tobacco Use   Smoking Status Every Day    Packs/day: 1 00    Types: Cigarettes   Smokeless Tobacco Never     Family History:   Family History   Problem Relation Age of Onset    Diabetes Father         MELLITUS     Meds/Allergies   all current active meds have been reviewed and current meds:   Current Facility-Administered Medications   Medication Dose Route Frequency    acetaminophen (TYLENOL) tablet 650 mg  650 mg Oral Once PRN    acetaminophen (TYLENOL) tablet 650 mg  650 mg Oral Q4H PRN    albuterol (PROVENTIL HFA,VENTOLIN HFA) inhaler 1 puff  1 puff Inhalation Q4H PRN    amLODIPine (NORVASC) tablet 10 mg  10 mg Oral Daily    aspirin chewable tablet 81 mg  81 mg Oral Daily    atorvastatin (LIPITOR) tablet 80 mg  80 mg Oral Daily With Introhive benzonatate (TESSALON PERLES) capsule 200 mg  200 mg Oral TID PRN    cholecalciferol (VITAMIN D3) tablet 1,000 Units  1,000 Units Oral Daily    cyanocobalamin (VITAMIN B-12) tablet 1,000 mcg  1,000 mcg Oral Daily    enoxaparin (LOVENOX) subcutaneous injection 40 mg  40 mg Subcutaneous Daily    fenofibrate (TRICOR) tablet 145 mg  145 mg Oral Daily    fluticasone (FLONASE) 50 mcg/act nasal spray 2 spray  2 spray Nasal Daily    hydrochlorothiazide (HYDRODIURIL) tablet 12 5 mg  12 5 mg Oral Daily    insulin lispro (HumaLOG) 100 units/mL subcutaneous injection 1-5 Units  1-5 Units Subcutaneous TID AC    insulin lispro (HumaLOG) 100 units/mL subcutaneous injection 1-5 Units  1-5 Units Subcutaneous HS    lisinopril (ZESTRIL) tablet 30 mg  30 mg Oral Daily    loratadine (CLARITIN) tablet 5 mg  5 mg Oral Daily    multivitamin stress formula tablet 1 tablet  1 tablet Oral Daily    nicotine (NICODERM CQ) 14 mg/24hr TD 24 hr patch 1 patch  1 patch Transdermal Daily    ondansetron (ZOFRAN-ODT) dispersible tablet 4 mg  4 mg Oral Once PRN     Allergies   Allergen Reactions    Penicillins      Other reaction(s): Other (See Comments)  Unknown  Other reaction(s): Other (See Comments)  Unknown  Other reaction(s): Unknown  Category: Allergy; Review of previous medical records was completed  Review of Systems   Constitutional: Negative for chills and fever  HENT: Negative for ear pain and sore throat  Eyes: Negative for pain and visual disturbance  Respiratory: Negative for cough and shortness of breath  Cardiovascular: Negative for chest pain and palpitations  Gastrointestinal: Negative for abdominal pain and vomiting  Genitourinary: Negative for dysuria and hematuria  Musculoskeletal: Negative for arthralgias and back pain  Skin: Negative for color change and rash  Neurological: Negative for seizures and syncope  All other systems reviewed and are negative        OBJECTIVE     Patient "ID: Sushila Cash is a 78 y o  male  Vitals:   Vitals:    23 0030 23 0748 23 0900 23 1120   BP: 122/67 134/79 134/79 132/79   BP Location: Left arm      Pulse: 83 77 77 75   Resp: 16      Temp: 98 5 °F (36 9 °C) 98 5 °F (36 9 °C)  98 °F (36 7 °C)   TempSrc: Oral      SpO2: 94% 93%  96%   Weight:   81 6 kg (180 lb)    Height:   5' 9\" (1 753 m)       Body mass index is 26 58 kg/m²  Intake/Output Summary (Last 24 hours) at 2023 1152  Last data filed at 2023 1605  Gross per 24 hour   Intake 500 ml   Output --   Net 500 ml       Temperature:   Temp (24hrs), Av 4 °F (36 9 °C), Min:98 °F (36 7 °C), Max:98 8 °F (37 1 °C)    Temperature: 98 °F (36 7 °C)    Invasive Devices: Invasive Devices     Peripheral Intravenous Line  Duration           Peripheral IV 23 Right Antecubital <1 day                Physical Exam  Vitals and nursing note reviewed  Constitutional:       General: He is not in acute distress  Appearance: He is well-developed  HENT:      Head: Normocephalic and atraumatic  Eyes:      Extraocular Movements: EOM normal       Conjunctiva/sclera: Conjunctivae normal    Cardiovascular:      Rate and Rhythm: Normal rate and regular rhythm  Heart sounds: No murmur heard  Pulmonary:      Effort: Pulmonary effort is normal  No respiratory distress  Breath sounds: Normal breath sounds  Abdominal:      Palpations: Abdomen is soft  Tenderness: There is no abdominal tenderness  Musculoskeletal:         General: No swelling  Cervical back: Neck supple  Skin:     General: Skin is warm and dry  Capillary Refill: Capillary refill takes less than 2 seconds  Neurological:      Mental Status: He is alert and oriented to person, place, and time  Motor: Motor strength is normal       Coordination: Finger-Nose-Finger Test normal       Gait: Gait is intact     Psychiatric:         Mood and Affect: Mood normal           Neurologic Exam " Mental Status   Oriented to person, place, and time  Attention: normal  Concentration: normal    Level of consciousness: alert  Knowledge: good  Cranial Nerves     CN II   Visual fields full to confrontation  CN III, IV, VI   Extraocular motions are normal      CN V   Facial sensation intact  CN VII   Facial expression full, symmetric  CN VIII   Hearing: intact    CN XII   Tongue: not atrophic  Fasciculations: absent  Tongue deviation: none    Motor Exam     Strength   Strength 5/5 throughout  Sensory Exam   Light touch normal      Gait, Coordination, and Reflexes     Gait  Gait: normal    Coordination   Finger to nose coordination: normal    Tremor   Resting tremor: absent  Intention tremor: absent         LABORATORY DATA     Labs: I have personally reviewed pertinent reports  Results from last 7 days   Lab Units 05/01/23  0619 04/30/23  1419   WBC Thousand/uL 11 92* 11 75*   HEMOGLOBIN g/dL 15 3 16 7   HEMATOCRIT % 48 4 51 3*   PLATELETS Thousands/uL 312 340   NEUTROS PCT % 56 43   MONOS PCT % 9 12      Results from last 7 days   Lab Units 05/01/23  0619 04/30/23  1419   POTASSIUM mmol/L 4 2 3 8   CHLORIDE mmol/L 105 104   CO2 mmol/L 25 25   BUN mg/dL 19 27*   CREATININE mg/dL 0 78 1 19   CALCIUM mg/dL 9 6 10 4*   ALK PHOS U/L  --  80   ALT U/L  --  17   AST U/L  --  15              Results from last 7 days   Lab Units 04/30/23  1419   INR  0 93   PTT seconds 28               IMAGING & DIAGNOSTIC TESTING     Radiology Results: I have personally reviewed pertinent reports  MRI brain wo contrast   Final Result by Arlene Gonzáles MD (04/[birth year] 1951)      Recent/subacute appearing lacunar infarcts in the right frontal lobe as well as right periventricular white matter  Otherwise no evidence for a large acute vascular distribution infarct and no acute intracranial hemorrhage  Mild chronic microvascular ischemic change  The study was marked in [facility] for immediate notification  Workstation performed: [id number]         CTA head and neck with and without contrast   Final Result by Jania Powell MD (04/30 1620)      No mass effect, acute intracranial hemorrhage or CT evidence for a large acute vascular distribution infarct  No evidence for high-grade stenosis, focal occlusion or vascular aneurysm of the cervical or intracranial vasculature  Atherosclerotic plaque at the carotid bulbs without evidence for high-grade stenosis or focal occlusion of the internal carotid arteries  Workstation performed: [id number]             Other Diagnostic Testing: I have personally reviewed pertinent reports        ACTIVE MEDICATIONS     Current Facility-Administered Medications   Medication Dose Route Frequency    acetaminophen (TYLENOL) tablet 650 mg  650 mg Oral Once PRN    acetaminophen (TYLENOL) tablet 650 mg  650 mg Oral Q4H PRN    albuterol (PROVENTIL HFA,VENTOLIN HFA) inhaler 1 puff  1 puff Inhalation Q4H PRN    amLODIPine (NORVASC) tablet 10 mg  10 mg Oral Daily    aspirin chewable tablet 81 mg  81 mg Oral Daily    atorvastatin (LIPITOR) tablet 80 mg  80 mg Oral Daily With Dinner    benzonatate (TESSALON PERLES) capsule 200 mg  200 mg Oral TID PRN    cholecalciferol (VITAMIN D3) tablet 1,000 Units  1,000 Units Oral Daily    cyanocobalamin (VITAMIN B-12) tablet 1,000 mcg  1,000 mcg Oral Daily    enoxaparin (LOVENOX) subcutaneous injection 40 mg  40 mg Subcutaneous Daily    fenofibrate (TRICOR) tablet 145 mg  145 mg Oral Daily    fluticasone (FLONASE) 50 mcg/act nasal spray 2 spray  2 spray Nasal Daily    hydrochlorothiazide (HYDRODIURIL) tablet 12 5 mg  12 5 mg Oral Daily    insulin lispro (HumaLOG) 100 units/mL subcutaneous injection 1-5 Units  1-5 Units Subcutaneous TID AC    insulin lispro (HumaLOG) 100 units/mL subcutaneous injection 1-5 Units  1-5 Units Subcutaneous HS    lisinopril (ZESTRIL) tablet 30 mg  30 mg Oral Daily    loratadine (CLARITIN) tablet 5 mg  5 mg Oral Daily    multivitamin stress formula tablet 1 tablet  1 tablet Oral Daily    nicotine (NICODERM CQ) 14 mg/24hr TD 24 hr patch 1 patch  1 patch Transdermal Daily    ondansetron (ZOFRAN-ODT) dispersible tablet 4 mg  4 mg Oral Once PRN       Prior to Admission medications    Medication Sig Start Date End Date Taking?  Authorizing Provider   Cholecalciferol (VITAMIN D3) 1000 units CAPS Take 1 capsule by mouth daily 4/21/15  Yes Historical Provider, MD   cyanocobalamin (VITAMIN B-12) 1,000 mcg tablet Take by mouth daily   Yes Historical Provider, MD   fenofibrate (TRICOR) 145 mg tablet TAKE 1 TABLET BY MOUTH EVERY DAY 2/21/23  Yes Radames Westbrook DO   hydrochlorothiazide (MICROZIDE) 12 5 mg capsule Take 1 capsule (12 5 mg total) by mouth daily 11/23/22  Yes Radames Westbrook DO   lisinopril (ZESTRIL) 30 mg tablet Take 1 tablet (30 mg total) by mouth daily 11/14/22  Yes Radames Westbrook DO   metFORMIN (GLUCOPHAGE) 1000 MG tablet Take 1 tablet (1,000 mg total) by mouth 2 (two) times a day with meals 11/14/22  Yes Radames Westbrook DO   Multiple Vitamin (MULTIVITAMIN) tablet Take 1 tablet by mouth daily   Yes Historical Provider, MD   VITAMIN B COMPLEX-C CAPS Take 1 capsule by mouth daily   Yes Historical Provider, MD   albuterol (PROVENTIL HFA,VENTOLIN HFA) 90 mcg/act inhaler TAKE 2 PUFFS BY MOUTH EVERY 6 HOURS AS NEEDED FOR WHEEZE 4/7/23   Radames Westbrook DO   amLODIPine (NORVASC) 10 mg tablet TAKE 1 TABLET BY MOUTH EVERY DAY 10/6/22   Radames Westbrook DO   atorvastatin (LIPITOR) 10 mg tablet TAKE 1 TABLET BY MOUTH EVERY DAY 2/21/23   Radames Westbrook DO   benzonatate (TESSALON) 200 MG capsule Take 1 capsule (200 mg total) by mouth 3 (three) times a day as needed for cough  Patient not taking: Reported on 4/30/2023 2/26/23   RUSS Farah   budesonide (RINOCORT AQUA) 32 MCG/ACT nasal spray 1 spray into each nostril daily  Patient not taking: Reported on 3/2/2023 6/9/17   Historical Provider, MD   budesonide (RINOCORT AQUA) 32 MCG/ACT nasal spray 1 spray into each nostril daily  Patient not taking: Reported on 10/19/2022 3/29/21   Montana Dover DO   cetirizine (ZyrTEC) 5 MG tablet Take 2 tablets (10 mg total) by mouth daily  Patient not taking: Reported on 4/30/2023 3/8/23   Jerod Christina MD   fluticasone Antonio Bhavesh) 50 mcg/act nasal spray 2 sprays into each nostril daily  Patient not taking: Reported on 4/30/2023 3/8/23   Jerod Christina MD   ibuprofen (MOTRIN) 800 mg tablet Take 800 mg by mouth every 8 (eight) hours as needed 11/10/18   Historical Provider, MD   Omega-3 Fatty Acids (FISH OIL) 1,000 mg Take 3 capsules by mouth daily  Patient not taking: Reported on 3/2/2023    Historical Provider, MD [address]     Code Status: Level 1 - Full Code  Advance Directive and Living Will: Yes    Power of :    POLST:        VTE Pharmacologic Prophylaxis: Enoxaparin (Lovenox)    ==  MD Cirilo Acosta [facility] Psychiatry Residency, PGY-1

## 2023-05-01 NOTE — PHYSICAL THERAPY NOTE
Physical Therapy Screen Note       05/01/23 1036   Note Type   Note type Screen   Additional Comments referral received for PT eval and tx  Meryle Romano states pt is ambulating independent  pt reports mobilizing w/o difficutly and has no concerns regarding discharge from the hospital from the mobility perspective  discontinue PT       Melinda Neal, PT

## 2023-05-02 ENCOUNTER — PATIENT OUTREACH (OUTPATIENT)
Dept: OTHER | Facility: CLINIC | Age: 68
End: 2023-05-02

## 2023-05-02 ENCOUNTER — TELEPHONE (OUTPATIENT)
Dept: NEUROLOGY | Facility: CLINIC | Age: 68
End: 2023-05-02

## 2023-05-02 LAB
EST. AVERAGE GLUCOSE BLD GHB EST-MCNC: 143 MG/DL
HBA1C MFR BLD: 6.6 %

## 2023-05-02 NOTE — TELEPHONE ENCOUNTER
HFU  Patient has been scheduled with Dr Marlene Wright on 6/13/23 at [address] [facility] in [facility] will need follow up in in 4 weeks with general or neurovascular Attending, Resident or AP  He will not require outpatient neurological testing

## 2023-05-05 NOTE — ED ATTENDING ATTESTATION
4/30/2023  I, Joshua Kumar MD, saw and evaluated the patient  I have discussed the patient with the resident/non-physician practitioner and agree with the resident's/non-physician practitioner's findings, Plan of Care, and MDM as documented in the resident's/non-physician practitioner's note, except where noted  All available labs and Radiology studies were reviewed  I was present for key portions of any procedure(s) performed by the resident/non-physician practitioner and I was immediately available to provide assistance  At this point I agree with the current assessment done in the Emergency Department    I have conducted an independent evaluation of this patient a history and physical is as follows:see h and p above - I agree with er resident tx plan / [address]    ED Course  ED Course as of 05/05/23 0347   Sun Apr 30, 2023   1421 Er md note- abcd2 score of 5    1512 - er md note-  pt did take all of his am meds today --    1512 Er md note -12 lead ecg reviewed by er md - nsr rate of 92-  no acute findings    1641 Er md note- pt re-evaluaTED- RESTING IN AND- NO RETURNS OF SYMPTOMS- AWARE OF UNREMARKABLE  CTA OF HEAD/NECK AND ECG/ LABS  AND DISCUSSING CASE WITH [facility] NEUROLOGIST ON CALL- AND WILL GET BACK TO HIM  WITH RECOMMENDATIONS         Critical Care Time  Procedures

## 2023-05-08 ENCOUNTER — TELEPHONE (OUTPATIENT)
Dept: NEUROLOGY | Facility: CLINIC | Age: 68
End: 2023-05-08

## 2023-05-08 NOTE — TELEPHONE ENCOUNTER
Post CVA Discharge Follow Up  Hospitalization: 4/30/23-5/1/23    Called patient with no answer  Left a voice message requesting for a call  Provided the office's phone number

## 2023-05-11 NOTE — TELEPHONE ENCOUNTER
Post CVA Discharge Follow Up  Hospitalization: 4/30/23-5/1/23    Called patient  Since discharge, he denies experiencing any new or worsening stroke-like symptoms  Patient denies the presence of any residual stroke symptoms following hospitalization and claims he has returned to baseline functioning  He is ambulating independently as well as preforming his own ADLs  Patient manages his own medications, appointments, and affairs  Reviewed appointments - patient is scheduled for a PCP follow up on 6/6/23  Patient reports this was a previously scheduled follow up prior to the stroke  Instructed patient to call the PCP office to notify them of the recent hospitalization to obtain sooner follow up  Patient is scheduled for cardiology appointment on 5/26/23 and neurology on appointment on 6/13/23  I reviewed medications with him  There have not been any medication changes since discharge from the hospital  Reports having no difficulties obtaining medications  Reports he is taking as prescribed with no medication side effects or signs of bleeding  During this call, we reviewed stroke type, symptoms, personal risk factors and management, medications, and resources  Patient verbalizes understanding  Offered to mail stroke education booklet, he is agreeable to this  Placed in mail  As for risk factors, patient reports monitoring his BP at home  He reports his BP typically ranges in the 680Q-592N for systolic and 12C-22H for diastolic  He is in the process of quitting smoking  Encouraged patient to follow a low salt / low cholesterol / diabetic friendly diet  I addressed all his questions  At the conclusion of the conversation, patient denies having any further questions or concerns

## 2023-05-15 ENCOUNTER — APPOINTMENT (OUTPATIENT)
Dept: LAB | Age: 68
End: 2023-05-15

## 2023-05-15 DIAGNOSIS — R97.20 ELEVATED PSA: ICD-10-CM

## 2023-05-15 DIAGNOSIS — E11.9 TYPE 2 DIABETES MELLITUS WITHOUT COMPLICATION, WITHOUT LONG-TERM CURRENT USE OF INSULIN (HCC): ICD-10-CM

## 2023-05-15 LAB
ALBUMIN SERPL BCP-MCNC: 4.1 G/DL (ref 3.5–5)
ALP SERPL-CCNC: 68 U/L (ref 46–116)
ALT SERPL W P-5'-P-CCNC: 25 U/L (ref 12–78)
ANION GAP SERPL CALCULATED.3IONS-SCNC: 0 MMOL/L (ref 4–13)
AST SERPL W P-5'-P-CCNC: 16 U/L (ref 5–45)
BILIRUB SERPL-MCNC: 0.49 MG/DL (ref 0.2–1)
BUN SERPL-MCNC: 21 MG/DL (ref 5–25)
CALCIUM SERPL-MCNC: 9.5 MG/DL (ref 8.3–10.1)
CHLORIDE SERPL-SCNC: 111 MMOL/L (ref 96–108)
CHOLEST SERPL-MCNC: 127 MG/DL
CO2 SERPL-SCNC: 24 MMOL/L (ref 21–32)
CREAT SERPL-MCNC: 0.94 MG/DL (ref 0.6–1.3)
EST. AVERAGE GLUCOSE BLD GHB EST-MCNC: 146 MG/DL
GFR SERPL CREATININE-BSD FRML MDRD: 84 ML/MIN/1.73SQ M
GLUCOSE P FAST SERPL-MCNC: 117 MG/DL (ref 65–99)
HBA1C MFR BLD: 6.7 %
HDLC SERPL-MCNC: 40 MG/DL
LDLC SERPL CALC-MCNC: 64 MG/DL (ref 0–100)
POTASSIUM SERPL-SCNC: 4.3 MMOL/L (ref 3.5–5.3)
PROT SERPL-MCNC: 7.6 G/DL (ref 6.4–8.4)
SODIUM SERPL-SCNC: 135 MMOL/L (ref 135–147)
TRIGL SERPL-MCNC: 117 MG/DL

## 2023-05-16 LAB
PSA FREE MFR SERPL: 19.4 %
PSA FREE SERPL-MCNC: 0.97 NG/ML
PSA SERPL-MCNC: 5 NG/ML (ref 0–4)

## 2023-05-17 DIAGNOSIS — I10 ESSENTIAL HYPERTENSION: ICD-10-CM

## 2023-05-17 RX ORDER — HYDROCHLOROTHIAZIDE 12.5 MG/1
CAPSULE, GELATIN COATED ORAL
Qty: 90 CAPSULE | Refills: 1 | Status: SHIPPED | OUTPATIENT
Start: 2023-05-17

## 2023-05-17 RX ORDER — AMLODIPINE BESYLATE 10 MG/1
TABLET ORAL
Qty: 90 TABLET | Refills: 1 | Status: SHIPPED | OUTPATIENT
Start: 2023-05-17

## 2023-05-17 RX ORDER — LISINOPRIL 30 MG/1
TABLET ORAL
Qty: 90 TABLET | Refills: 1 | Status: SHIPPED | OUTPATIENT
Start: 2023-05-17

## 2023-05-22 ENCOUNTER — OFFICE VISIT (OUTPATIENT)
Dept: INTERNAL MEDICINE CLINIC | Facility: CLINIC | Age: 68
End: 2023-05-22

## 2023-05-22 VITALS
WEIGHT: 180.4 LBS | BODY MASS INDEX: 26.72 KG/M2 | DIASTOLIC BLOOD PRESSURE: 76 MMHG | SYSTOLIC BLOOD PRESSURE: 120 MMHG | OXYGEN SATURATION: 96 % | HEART RATE: 95 BPM | HEIGHT: 69 IN

## 2023-05-22 DIAGNOSIS — I10 PRIMARY HYPERTENSION: Primary | ICD-10-CM

## 2023-05-22 DIAGNOSIS — Z23 ENCOUNTER FOR IMMUNIZATION: ICD-10-CM

## 2023-05-22 DIAGNOSIS — R89.9 ABNORMAL LABORATORY TEST: ICD-10-CM

## 2023-05-22 DIAGNOSIS — Z72.0 TOBACCO ABUSE: ICD-10-CM

## 2023-05-22 DIAGNOSIS — E78.5 DYSLIPIDEMIA: ICD-10-CM

## 2023-05-22 DIAGNOSIS — I63.81 STROKE, LACUNAR (HCC): ICD-10-CM

## 2023-05-22 DIAGNOSIS — I10 PRIMARY HYPERTENSION: ICD-10-CM

## 2023-05-22 DIAGNOSIS — R97.20 ELEVATED PSA: ICD-10-CM

## 2023-05-22 DIAGNOSIS — E78.5 DYSLIPIDEMIA: Primary | ICD-10-CM

## 2023-05-22 DIAGNOSIS — E11.9 TYPE 2 DIABETES MELLITUS WITHOUT COMPLICATION, WITHOUT LONG-TERM CURRENT USE OF INSULIN (HCC): ICD-10-CM

## 2023-05-22 RX ORDER — MENTHOL AND METHYL SALICYLATE 10; 30 G/100G; G/100G
1 STICK TOPICAL DAILY PRN
COMMUNITY

## 2023-05-22 RX ORDER — ATORVASTATIN CALCIUM 40 MG/1
40 TABLET, FILM COATED ORAL DAILY
Qty: 90 TABLET | Refills: 3 | Status: SHIPPED | OUTPATIENT
Start: 2023-05-22

## 2023-05-22 NOTE — ASSESSMENT & PLAN NOTE
Recent hospitalization for right frontal lacunar infarction likely related to tobacco abuse, hypertension; the patient has successfully quit smoking his blood pressure is better controlled he is monitoring home readings which are now stabilized he will continue to monitor his home readings reduce stress reduce sodium

## 2023-05-22 NOTE — ASSESSMENT & PLAN NOTE
hypertension - controlled, I have counseled patient following healthy balance diet, I would like the patient reduce sodium, exercise routinely, I would like the patient continued the med current medical regiment and we will continue to monitor    Blood pressure 120/76 continue Zestril 30 mg once daily, amlodipine 10 mg once daily, HCTZ 12 5 mg once daily blood pressures currently optimized

## 2023-05-22 NOTE — PROGRESS NOTES
Assessment/Plan:    Diabetes mellitus type 2    Lab Results   Component Value Date    HGBA1C 6 7 (H) 05/15/2023   I have counselled the pt to follow a healthy and balanced diet ,and recommend routine exercise  I will be ordering diabetic laboratories including comprehensive metabolic panel, hemoglobin A1c, urine microalbumin, lipid panel  Essential hypertension   hypertension - controlled, I have counseled patient following healthy balance diet, I would like the patient reduce sodium, exercise routinely, I would like the patient continued the med current medical regiment and we will continue to monitor  Blood pressure 120/76 continue Zestril 30 mg once daily, amlodipine 10 mg once daily, HCTZ 12 5 mg once daily blood pressures currently optimized    Stroke, lacunar ([address] )  Recent hospitalization for right frontal lacunar infarction likely related to tobacco abuse, hypertension; the patient has successfully quit smoking his blood pressure is better controlled he is monitoring home readings which are now stabilized he will continue to monitor his home readings reduce stress reduce sodium    Dyslipidemia  Hyperlipidemia controlled continue with current medical regiment recommend a low-cholesterol diet and recommend routine exercise we will continue to monitor the progress  Continue Lipitor patient will meet with pharmacist Chance to discuss the Tricor    Tobacco abuse  Patient has successfully quit smoking we congratulated the patient and encouraged the patient to continue to have ongoing abstinence         Problem List Items Addressed This Visit        Endocrine    Diabetes mellitus type 2       Lab Results   Component Value Date    HGBA1C 6 7 (H) 05/15/2023   I have counselled the pt to follow a healthy and balanced diet ,and recommend routine exercise  I will be ordering diabetic laboratories including comprehensive metabolic panel, hemoglobin A1c, urine microalbumin, lipid panel           Relevant Orders Albumin / creatinine urine ratio    Hemoglobin A1C    Lipid Panel with Direct LDL reflex       Cardiovascular and Mediastinum    Essential hypertension - Primary      hypertension - controlled, I have counseled patient following healthy balance diet, I would like the patient reduce sodium, exercise routinely, I would like the patient continued the med current medical regiment and we will continue to monitor  Blood pressure 120/76 continue Zestril 30 mg once daily, amlodipine 10 mg once daily, HCTZ 12 5 mg once daily blood pressures currently optimized         Relevant Orders    Ambulatory referral to clinical pharmacy       Nervous and Auditory    Stroke, lacunar ([facility] [address] )     Recent hospitalization for right frontal lacunar infarction likely related to tobacco abuse, hypertension; the patient has successfully quit smoking his blood pressure is better controlled he is monitoring home readings which are now stabilized he will continue to monitor his home readings reduce stress reduce sodium            Other    Dyslipidemia     Hyperlipidemia controlled continue with current medical regiment recommend a low-cholesterol diet and recommend routine exercise we will continue to monitor the progress    Continue Lipitor patient will meet with pharmacist Chance to discuss the Tricor         Relevant Orders    Comprehensive metabolic panel    Tobacco abuse     Patient has successfully quit smoking we congratulated the patient and encouraged the patient to continue to have ongoing abstinence        Other Visit Diagnoses     Encounter for immunization        Abnormal laboratory test        Relevant Orders    Protein electrophoresis, serum    Protein electrophoresis, urine    Elevated PSA        Relevant Orders    Ambulatory Referral to Urology    MRI prostate multiparametric wo w contrast        RTO in 6 months call if any problems  Patient does report to me he has noticed a little bit of weakness in his hands that has been intermittent sometimes difficulty with gripping things and picking things up he has noticed no muscle achiness it started prior to starting Lipitor at this point time he would prefer to hold off on diagnostic evaluation including EMG but will discuss further with his neurologist he will monitor symptoms if there is a change or worsening he will let me know for further evaluation  Subjective:      Patient ID: Yamini Gerber is a 78 y o  male  HPI 71-year old male coming in for a follow up visit regarding primary hypertension, hyperlipidemia, lacunar infarction, tobacco abuse and type 2 diabetes; the patient reports me compliant taking medications without untoward side effects the  The patient is here to review his medical condition, update me on the medical condition and the patient reports me 1 hospitalizations and no ER visits  No injuries no illnesses overall doing much better he has successfully quit smoking  He takes his medications routinely recent hospitalization for lacunar infarction reviewed the hospitalization, discharge, medication list test and labs and MRI reviewed in detail with the patient quit tobb march  The patient reports me prior to hospitalization he developed numbness of the left upper extremity along with expressive aphasia  He is wife directly took him to the ER for full evaluation of stroke we did review the MRI along with CTA of the head    The following portions of the patient's history were reviewed and updated as appropriate: allergies, current medications, past family history, past medical history, past social history, past surgical history and problem list     Review of Systems   Constitutional: Negative for activity change, appetite change and unexpected weight change  HENT: Negative for congestion  Eyes: Negative for visual disturbance  Respiratory: Negative for cough and shortness of breath  Cardiovascular: Negative for chest pain     Gastrointestinal: Negative for abdominal pain, diarrhea, nausea and vomiting  Neurological: Negative for dizziness, light-headedness and headaches  Hematological: Negative for adenopathy  Objective:    Return in about 6 months (around 11/22/2023)  Procedure: CTA head and neck with and without contrast    Result Date: 4/30/2023  Narrative: CTA NECK AND BRAIN WITH AND WITHOUT CONTRAST INDICATION: CVA like sx COMPARISON:   None  TECHNIQUE:  Routine CT imaging of the Brain without contrast   Post contrast imaging was performed after administration of iodinated contrast through the neck and brain  Post contrast axial 0 625 mm images timed to opacify the arterial system  3D rendering was performed on an independent workstation  MIP reconstructions performed  Coronal reconstructions were performed of the noncontrast portion of the brain  Radiation dose length product (DLP) for this visit:  2170 mGy-cm   This examination, like all CT scans performed in the [facility], was performed utilizing techniques to minimize radiation dose exposure, including the use of iterative reconstruction and automated exposure control  IV Contrast:  85 mL of iohexol (OMNIPAQUE) IMAGE QUALITY:   Diagnostic FINDINGS: NONCONTRAST BRAIN PARENCHYMA:  No intracranial mass, mass effect or midline shift  No CT signs of acute infarction  No acute parenchymal hemorrhage  VENTRICLES AND EXTRA-AXIAL SPACES:  Normal for the patient's age  VISUALIZED ORBITS: Normal visualized orbits  PARANASAL SINUSES: Normal visualized paranasal sinuses  CERVICAL VASCULATURE AORTIC ARCH AND GREAT VESSELS:  Mild atherosclerotic disease of the arch, proximal great vessels and visualized subclavian vessels  No significant stenosis  RIGHT VERTEBRAL ARTERY CERVICAL SEGMENT:  Normal origin  The vessel is normal in caliber throughout the neck  LEFT VERTEBRAL ARTERY CERVICAL SEGMENT:  Normal origin  The vessel is normal in caliber throughout the neck   RIGHT EXTRACRANIAL CAROTID SEGMENT:  Mild atherosclerotic disease of the distal common carotid artery and proximal cervical internal carotid artery without significant stenosis compared to the more distal ICA  LEFT EXTRACRANIAL CAROTID SEGMENT: Mild atherosclerotic disease of the distal common carotid artery and proximal cervical internal carotid artery without significant stenosis compared to the more distal ICA  NASCET criteria was used to determine the degree of internal carotid artery diameter stenosis  INTRACRANIAL VASCULATURE INTERNAL CAROTID ARTERIES:  Normal enhancement of the intracranial portions of the internal carotid arteries  Normal ophthalmic artery origins  Normal ICA terminus  Atherosclerotic plaque along the cavernous segments ANTERIOR CIRCULATION:  Symmetric A1 segments and anterior cerebral arteries with normal enhancement  Normal anterior communicating artery  MIDDLE CEREBRAL ARTERY CIRCULATION:  M1 segment and middle cerebral artery branches demonstrate normal enhancement bilaterally  DISTAL VERTEBRAL ARTERIES:  Normal distal vertebral arteries  Posterior inferior cerebellar artery origins are normal  Normal vertebral basilar junction  BASILAR ARTERY:  Basilar artery is normal in caliber  Normal superior cerebellar arteries  POSTERIOR CEREBRAL ARTERIES: Both posterior cerebral arteries arises from the basilar tip  Both arteries demonstrate normal enhancement  Normal posterior communicating arteries  VENOUS STRUCTURES:  Normal  NON VASCULAR ANATOMY BONY STRUCTURES:  No acute osseous abnormality  SOFT TISSUES OF THE NECK:  Unremarkable  THORACIC INLET:  Normal      Impression: No mass effect, acute intracranial hemorrhage or CT evidence for a large acute vascular distribution infarct  No evidence for high-grade stenosis, focal occlusion or vascular aneurysm of the cervical or intracranial vasculature   Atherosclerotic plaque at the carotid bulbs without evidence for high-grade stenosis or focal occlusion of the internal carotid arteries  Workstation performed: [id number]     Procedure: MRI brain wo contrast    Result Date: 4/30/2023  Narrative: MRI BRAIN WITHOUT CONTRAST INDICATION: Stroke Like symptoms    COMPARISON:   CT from earlier the same day  TECHNIQUE:  Multiplanar, multisequence imaging of the brain was performed  IMAGE QUALITY:  Diagnostic  FINDINGS: BRAIN PARENCHYMA: There is no evidence for large acute vascular distribution infarct  There is no midline shift  There is no acute intracranial hemorrhage  There is mild chronic microvascular scheming change  There is a focus of diffusion weighted hyperintensity within the periventricular white matter adjacent to the posterior body of the right lateral ventricle without discrete corresponding low ADC signal  There is also a punctate focus of diffusion weighted hyperintensity within the right frontal lobe on series 3, image 23  Findings likely represent recent/subacute lacunar infarcts  VENTRICLES:  Normal for the patient's age  SELLA AND PITUITARY GLAND:  Normal  ORBITS:  Normal  PARANASAL SINUSES:  Normal  VASCULATURE:  Evaluation of the major intracranial vasculature demonstrates appropriate flow voids  CALVARIUM AND SKULL BASE: There is right mastoid air cell effusion  EXTRACRANIAL SOFT TISSUES: There is retrodental pannus formation  Impression: Recent/subacute appearing lacunar infarcts in the right frontal lobe as well as right periventricular white matter  Otherwise no evidence for a large acute vascular distribution infarct and no acute intracranial hemorrhage  Mild chronic microvascular ischemic change  The study was marked in [facility] for immediate notification  Workstation performed: [id number]     Procedure: Echo complete w/ contrast if indicated    Result Date: 5/1/2023  Narrative: •  Left Ventricle: Left ventricular cavity size is normal  Wall thickness is normal  The left ventricular ejection fraction is 65%   Systolic function is normal  Wall motion is normal  Diastolic function is normal  •  Atrial Septum: There is a patent foramen ovale confirmed at rest and with provocation (cough and abdominal compression) with predominant right to left shunting using saline contrast           Allergies   Allergen Reactions   • Penicillins      Other reaction(s): Other (See Comments)  Unknown  Other reaction(s): Other (See Comments)  Unknown  Other reaction(s): Unknown  Category: Allergy;         Past Medical History:   Diagnosis Date   • Adenocarcinoma of prostate [facility])     [id number]  LAST ASSESSED   • Diabetes mellitus ([facility] [address] )    • Hypertension      Past Surgical History:   Procedure Laterality Date   • COLON SURGERY     • HERNIA REPAIR     • SHOULDER SURGERY     • TONSILLECTOMY     • TONSILLECTOMY AND ADENOIDECTOMY       Current Outpatient Medications on File Prior to Visit   Medication Sig Dispense Refill   • albuterol (PROVENTIL HFA,VENTOLIN HFA) 90 mcg/act inhaler TAKE 2 PUFFS BY MOUTH EVERY 6 HOURS AS NEEDED FOR WHEEZE 1 g 1   • amLODIPine (NORVASC) 10 mg tablet TAKE 1 TABLET BY MOUTH EVERY DAY 90 tablet 1   • aspirin 81 mg chewable tablet Chew 1 tablet (81 mg total) daily Do not start before May 2, 2023  30 tablet 0   • Cholecalciferol (VITAMIN D3) 1000 units CAPS Take 1 capsule by mouth daily     • cyanocobalamin (VITAMIN B-12) 1,000 mcg tablet Take by mouth daily     • hydrochlorothiazide (MICROZIDE) 12 5 mg capsule TAKE 1 CAPSULE BY MOUTH EVERY DAY 90 capsule 1   • lisinopril (ZESTRIL) 30 mg tablet TAKE 1 TABLET BY MOUTH EVERY DAY 90 tablet 1   • metFORMIN (GLUCOPHAGE) 1000 MG tablet Take 1 tablet (1,000 mg total) by mouth 2 (two) times a day with meals 180 tablet 3   • Multiple Vitamin (MULTIVITAMIN) tablet Take 1 tablet by mouth daily     • VITAMIN B COMPLEX-C CAPS Take 1 capsule by mouth daily     • [DISCONTINUED] atorvastatin (LIPITOR) 10 mg tablet TAKE 1 TABLET BY MOUTH EVERY DAY 30 tablet 3   • [DISCONTINUED] fenofibrate (TRICOR) 145 mg tablet TAKE 1 TABLET BY MOUTH EVERY DAY 30 tablet 5   • [DISCONTINUED] ibuprofen (MOTRIN) 800 mg tablet Take 800 mg by mouth every 8 (eight) hours as needed (Patient not taking: Reported on 5/22/2023)  0   • [DISCONTINUED] nicotine (NICODERM CQ) 14 mg/24hr TD 24 hr patch Place 1 patch on the skin over 24 hours daily Do not start before May 2, 2023  (Patient not taking: Reported on 5/22/2023) 28 patch 0   • [DISCONTINUED] Omega-3 Fatty Acids (FISH OIL) 1,000 mg Take 3 capsules by mouth daily (Patient not taking: Reported on 5/22/2023)       No current facility-administered medications on file prior to visit  Family History   Problem Relation Age of Onset   • Diabetes Father         MELLITUS     Social History     Socioeconomic History   • Marital status: /Civil Union     Spouse name: Not on file   • Number of children: Not on file   • Years of education: Not on file   • Highest education level: Not on file   Occupational History   • Not on file   Tobacco Use   • Smoking status: Every Day     Packs/day: 1 00     Types: Cigarettes   • Smokeless tobacco: Never   Vaping Use   • Vaping Use: Never used   Substance and Sexual Activity   • Alcohol use: Not Currently   • Drug use: No   • Sexual activity: Yes   Other Topics Concern   • Not on file   Social History Narrative    COPY OF ADVANCE DIRECTIVE OBTAINED     Social Determinants of Health     Financial Resource Strain: Not on file   Food Insecurity: No Food Insecurity   • Worried About Running Out of Food in the Last Year: Never true   • Ran Out of Food in the Last Year: Never true   Transportation Needs: No Transportation Needs   • Lack of Transportation (Medical): No   • Lack of Transportation (Non-Medical):  No   Physical Activity: Not on file   Stress: Not on file   Social Connections: Not on file   Intimate Partner Violence: Not on file   Housing Stability: Low Risk    • Unable to Pay for Housing in the Last Year: No   • Number of Places Lived in the Last Year: 1   • Unstable "Housing in the Last Year: No     Vitals:    05/22/23 1110   BP: 120/76   Pulse: 95   SpO2: 96%   Weight: 81 8 kg (180 lb 6 4 oz)   Height: 5' 9\" (1 753 m)     Results for orders placed or performed in visit on 05/15/23   PSA, total and free   Result Value Ref Range    Prostate Specific Antigen Total 5 0 (H) 0 0 - 4 0 ng/mL    PSA, Free 0 97 N/A ng/mL    PSA, Free Pct 19 4 %   Comprehensive metabolic panel   Result Value Ref Range    Sodium 135 135 - 147 mmol/L    Potassium 4 3 3 5 - 5 3 mmol/L    Chloride 111 (H) 96 - 108 mmol/L    CO2 24 21 - 32 mmol/L    ANION GAP 0 (L) 4 - 13 mmol/L    BUN 21 5 - 25 mg/dL    Creatinine 0 94 0 60 - 1 30 mg/dL    Glucose, Fasting 117 (H) 65 - 99 mg/dL    Calcium 9 5 8 3 - 10 1 mg/dL    AST 16 5 - 45 U/L    ALT 25 12 - 78 U/L    Alkaline Phosphatase 68 46 - 116 U/L    Total Protein 7 6 6 4 - 8 4 g/dL    Albumin 4 1 3 5 - 5 0 g/dL    Total Bilirubin 0 49 0 20 - 1 00 mg/dL    eGFR 84 ml/min/1 73sq m   Lipid Panel with Direct LDL reflex   Result Value Ref Range    Cholesterol 127 See Comment mg/dL    Triglycerides 117 See Comment mg/dL    HDL, Direct 40 >=40 mg/dL    LDL Calculated 64 0 - 100 mg/dL   Hemoglobin A1C   Result Value Ref Range    Hemoglobin A1C 6 7 (H) Normal 3 8-5 6%; PreDiabetic 5 7-6 4%; Diabetic >=6 5%; Glycemic control for adults with diabetes <7 0% %     mg/dl     Weight (last 2 days)     Date/Time Weight    05/22/23 1110 81 8 (180 4)        Body mass index is 26 64 kg/m²  BP      Temp      Pulse     Resp      SpO2        Vitals:    05/22/23 1110   Weight: 81 8 kg (180 lb 6 4 oz)     Vitals:    05/22/23 1110   Weight: 81 8 kg (180 lb 6 4 oz)       /76   Pulse 95   Ht 5' 9\" (1 753 m)   Wt 81 8 kg (180 lb 6 4 oz)   SpO2 96%   BMI 26 64 kg/m²          Physical Exam  Vitals and nursing note reviewed  Constitutional:       General: He is not in acute distress  Appearance: Normal appearance  He is well-developed   He is not ill-appearing, " toxic-appearing or diaphoretic  HENT:      Head: Normocephalic and atraumatic  Right Ear: External ear normal       Left Ear: External ear normal    Eyes:      General: No scleral icterus  Right eye: No discharge  Left eye: No discharge  Conjunctiva/sclera: Conjunctivae normal       Pupils: Pupils are equal, round, and reactive to light  Cardiovascular:      Rate and Rhythm: Normal rate and regular rhythm  Heart sounds: Normal heart sounds  No murmur heard  No friction rub  No gallop  Pulmonary:      Effort: No respiratory distress  Breath sounds: No wheezing or rales  Abdominal:      General: Bowel sounds are normal  There is no distension  Palpations: Abdomen is soft  There is no mass  Tenderness: There is no abdominal tenderness  There is no guarding or rebound  Musculoskeletal:         General: No deformity  Cervical back: Neck supple  Lymphadenopathy:      Cervical: No cervical adenopathy  Neurological:      Mental Status: He is alert         Cranial nerves II through XII intact, no facial weakness PERRLA gait intact

## 2023-05-22 NOTE — PROGRESS NOTES
[address] [id number]  Dyslipidemia  Assessment & Plan:  Hyperlipidemia with clinical ASCVD event: 2018 ACC/AHA lipid guidelines recommend high intensity statin  Patient currently on moderate intensity with fenofibrate and tolerating well without side effects; cost concerns with fenofibrate  Increasing statin would also help maintain TG control  Lipid panel/LFTs acceptable; TGs previously elevated but now resolved  Medications: Reviewed increasing atorvastatin to higher intensity given recent stroke and stopping fenofibrate, patient voiced understanding  Order pended for atorvastatin 40mg/day, patient will use up current supply by taking 4 - 10mg tablets once daily, stop fenofibrate  Labs: 10/2023 as ordered by PCP          Follow-up: will hold off on follow-up appointment at this time, patient agrees to contact PharmD if ufuture concerns  SUBJECTIVE                                                                                                                        Medication list reviewed with patient, reports the following discrepancies/problems:  Albuterol: believes he has a higher cost, but uses ~1x/month as COPD is otherwise controlled  amLODIPine  aspirin  Atorvastatin: started ~2020  Fenofibrate: $45/30day this year, last year was $29/30-day  Cost concerns with medication use  Started 8/2012 due to hypertriglyceridemia  fish oil: no longer taking, stopped around 1 year  hydrochlorothiazide  Ibuprofen: no longer taking  lisinopril  metFORMIN  multivitamin  Nicotine: not using  Vitamin B Complex-C Caps  vitamin B-12  Vitamin D3 Caps  Acetaminophen: may take ~1x/month  Icy hot: uses for pain    Last year had much lower cost for medications,     One puff but then doesn't inhale, reports 1 cigarette will last him 1 week; tried with patches but reports cravings were not as resolved  Reports this has worked well for him       Only has muscle aches when he does over exertion, does not experience frequently    No cost issues with other medications    OBJECTIVE                                                                                                                              BP Readings from Last 3 Encounters:   05/22/23 120/76   05/01/23 132/79   02/26/23 (!) 186/84       Pulse Readings from Last 3 Encounters:   05/22/23 95   05/01/23 75   02/26/23 (!) 117       Lab Results   Component Value Date    SODIUM 135 05/15/2023    K 4 3 05/15/2023     (H) 05/15/2023    CO2 24 05/15/2023    BUN 21 05/15/2023    CREATININE 0 94 05/15/2023    GLUC 123 05/01/2023    CALCIUM 9 5 05/15/2023       Lab Results   Component Value Date    HGBA1C 6 7 (H) 05/15/2023    HGBA1C 6 6 (H) 05/01/2023    HGBA1C 7 2 (A) 10/19/2022       Lab Results   Component Value Date    CHOLESTEROL 127 05/15/2023    CHOLESTEROL 112 05/01/2023    CHOLESTEROL 128 11/16/2022     Lab Results   Component Value Date    HDL 40 05/15/2023    HDL 32 (L) 05/01/2023    HDL 36 (L) 11/16/2022     Lab Results   Component Value Date    TRIG 117 05/15/2023    TRIG 135 05/01/2023    TRIG 177 (H) 11/16/2022     Lab Results   Component Value Date    LDLCALC 64 05/15/2023    LDLCALC 53 05/01/2023    [address] 11/16/2022       Pharmacist Tracking Tool  Reason For Outreach: Embedded Pharmacist  Demographics:  Intervention Method:  In Person  Type of Intervention: New  Topics Addressed: Dyslipidemia, Polypharmacy and Transitions of Care  Pharmacologic Interventions: Medication Discontinuation, Dose or Frequency Adjusted and Med Rec  Non-Pharmacologic Interventions: Cost, Disease state education and Care Gap  Time:  Direct Patient Care: 20 mins   Care Coordination: 15 mins  Recommendation Recipient: Patient/Caregiver  Outcome: Accepted

## 2023-05-22 NOTE — ASSESSMENT & PLAN NOTE
Hyperlipidemia with clinical ASCVD event: 2018 ACC/AHA lipid guidelines recommend high intensity statin  Patient currently on moderate intensity with fenofibrate and tolerating well without side effects; cost concerns with fenofibrate  Increasing statin would also help maintain TG control  Lipid panel/LFTs acceptable; TGs previously elevated but now resolved  • Medications: Reviewed increasing atorvastatin to higher intensity given recent stroke and stopping fenofibrate, patient voiced understanding  Order pended for atorvastatin 40mg/day, patient will use up current supply by taking 4 - 10mg tablets once daily, stop fenofibrate     • Labs: 10/2023 as ordered by PCP

## 2023-05-22 NOTE — ASSESSMENT & PLAN NOTE
Hyperlipidemia controlled continue with current medical regiment recommend a low-cholesterol diet and recommend routine exercise we will continue to monitor the progress    Continue Lipitor patient will meet with pharmacist Chance to discuss the Tricor

## 2023-05-22 NOTE — ASSESSMENT & PLAN NOTE
Lab Results   Component Value Date    HGBA1C 6 7 (H) 05/15/2023   I have counselled the pt to follow a healthy and balanced diet ,and recommend routine exercise  I will be ordering diabetic laboratories including comprehensive metabolic panel, hemoglobin A1c, urine microalbumin, lipid panel

## 2023-05-22 NOTE — ASSESSMENT & PLAN NOTE
Patient has successfully quit smoking we congratulated the patient and encouraged the patient to continue to have ongoing abstinence

## 2023-05-26 ENCOUNTER — OFFICE VISIT (OUTPATIENT)
Dept: CARDIOLOGY CLINIC | Facility: CLINIC | Age: 68
End: 2023-05-26

## 2023-05-26 ENCOUNTER — TELEPHONE (OUTPATIENT)
Dept: CARDIOLOGY CLINIC | Facility: CLINIC | Age: 68
End: 2023-05-26

## 2023-05-26 VITALS
DIASTOLIC BLOOD PRESSURE: 68 MMHG | SYSTOLIC BLOOD PRESSURE: 138 MMHG | BODY MASS INDEX: 26.84 KG/M2 | HEART RATE: 96 BPM | WEIGHT: 181.2 LBS | HEIGHT: 69 IN

## 2023-05-26 DIAGNOSIS — Q21.12 PFO (PATENT FORAMEN OVALE): ICD-10-CM

## 2023-05-26 DIAGNOSIS — I10 PRIMARY HYPERTENSION: Primary | ICD-10-CM

## 2023-05-26 DIAGNOSIS — R55 SYNCOPE AND COLLAPSE: ICD-10-CM

## 2023-05-26 DIAGNOSIS — E11.9 TYPE 2 DIABETES MELLITUS WITHOUT COMPLICATION, WITHOUT LONG-TERM CURRENT USE OF INSULIN (HCC): ICD-10-CM

## 2023-05-26 DIAGNOSIS — E78.5 DYSLIPIDEMIA: ICD-10-CM

## 2023-05-26 DIAGNOSIS — R42 POSTURAL DIZZINESS: ICD-10-CM

## 2023-05-26 DIAGNOSIS — G45.9 TIA (TRANSIENT ISCHEMIC ATTACK): ICD-10-CM

## 2023-05-26 NOTE — TELEPHONE ENCOUNTER
14 day Zio monitor has been placed in office after visit and registered online with [facility]

## 2023-05-26 NOTE — PROGRESS NOTES
Cardiology Consultation     Fredy Giron  [id number]  1955  [facility] [address] [address] [phone]    1  Primary hypertension  NM myocardial perfusion spect (stress and/or rest)    AMB extended holter monitor      2  Dyslipidemia  NM myocardial perfusion spect (stress and/or rest)    AMB extended holter monitor      3  Postural dizziness  NM myocardial perfusion spect (stress and/or rest)    AMB extended holter monitor      4  TIA (transient ischemic attack)  NM myocardial perfusion spect (stress and/or rest)    AMB extended holter monitor      5  PFO (patent foramen ovale)        6  Diabetes mellitus type 2  NM myocardial perfusion spect (stress and/or rest)    AMB extended holter monitor      7  Syncope and collapse  NM myocardial perfusion spect (stress and/or rest)         Chief Complaint   Patient presents with   • New Patient Visit     Est  Care    • Dizziness     Occurs with positional changes     HPI: Patient is here for cardiac evaluation of postural dizziness  No reported chest pain, shortness of breath, palpitations, syncope, LE edema, orthopnea, PND, or significant weight changes  Patient remains active without any increased fatigue out of the ordinary  Patient Active Problem List   Diagnosis   • Diabetes 1 5, managed as type 2 ([facility] [address] )   • Seasonal allergic rhinitis   • Encounter for hepatitis C screening test for low risk patient   • Dyslipidemia   • Essential hypertension   • Diabetes mellitus type 2   • Need for 23-polyvalent pneumococcal polysaccharide vaccine   • Overweight (BMI 25 0-29  9)   • Tinea pedis of left foot   • Essential hypertension   • Hypertriglyceridemia   • Tobacco abuse   • Chronic obstructive pulmonary disease, unspecified COPD type (HCC)   • Leukocytosis   • Diarrhea   • Screening for HIV (human immunodeficiency virus)   • Wellness examination   • Chronic low back pain   • Neck pain   • Increased prostate specific antigen (PSA) velocity   • TIA (transient ischemic attack)   • Stroke, lacunar (HCC)   • Postural dizziness   • PFO (patent foramen ovale)     Past Medical History:   Diagnosis Date   • Adenocarcinoma of prostate ([facility] [address] )     [id number]  LAST ASSESSED   • Diabetes mellitus ([facility] )    • Hypertension      Social History     Socioeconomic History   • Marital status: /Civil Union     Spouse name: Not on file   • Number of children: Not on file   • Years of education: Not on file   • Highest education level: Not on file   Occupational History   • Not on file   Tobacco Use   • Smoking status: Every Day     Packs/day: 1 00     Types: Cigarettes   • Smokeless tobacco: Never   Vaping Use   • Vaping Use: Never used   Substance and Sexual Activity   • Alcohol use: Not Currently   • Drug use: No   • Sexual activity: Yes   Other Topics Concern   • Not on file   Social History Narrative    COPY OF ADVANCE DIRECTIVE OBTAINED     Social Determinants of Health     Financial Resource Strain: Not on file   Food Insecurity: No Food Insecurity (5/1/2023)    Hunger Vital Sign    • Worried About Running Out of Food in the Last Year: Never true    • Ran Out of Food in the Last Year: Never true   Transportation Needs: No Transportation Needs (5/1/2023)    PRAPARE - Transportation    • Lack of Transportation (Medical): No    • Lack of Transportation (Non-Medical):  No   Physical Activity: Not on file   Stress: Not on file   Social Connections: Not on file   Intimate Partner Violence: Not on file   Housing Stability: Low Risk  (5/1/2023)    Housing Stability Vital Sign    • Unable to Pay for Housing in the Last Year: No    • Number of Places Lived in the Last Year: 1    • Unstable Housing in the Last Year: No      Family History   Problem Relation Age of Onset   • Diabetes Father         MELLITUS     Past Surgical History:   Procedure Laterality Date   • COLON "SURGERY     • HERNIA REPAIR     • SHOULDER SURGERY     • TONSILLECTOMY     • TONSILLECTOMY AND ADENOIDECTOMY         Current Outpatient Medications:   •  albuterol (PROVENTIL HFA,VENTOLIN HFA) 90 mcg/act inhaler, TAKE 2 PUFFS BY MOUTH EVERY 6 HOURS AS NEEDED FOR WHEEZE, Disp: 1 g, Rfl: 1  •  amLODIPine (NORVASC) 10 mg tablet, TAKE 1 TABLET BY MOUTH EVERY DAY, Disp: 90 tablet, Rfl: 1  •  aspirin 81 mg chewable tablet, Chew 1 tablet (81 mg total) daily Do not start before May 2, 2023 , Disp: 30 tablet, Rfl: 0  •  atorvastatin (LIPITOR) 40 mg tablet, Take 1 tablet (40 mg total) by mouth daily, Disp: 90 tablet, Rfl: 3  •  Cholecalciferol (VITAMIN D3) 1000 units CAPS, Take 1 capsule by mouth daily, Disp: , Rfl:   •  cyanocobalamin (VITAMIN B-12) 1,000 mcg tablet, Take by mouth daily, Disp: , Rfl:   •  hydrochlorothiazide (MICROZIDE) 12 5 mg capsule, TAKE 1 CAPSULE BY MOUTH EVERY DAY, Disp: 90 capsule, Rfl: 1  •  lisinopril (ZESTRIL) 30 mg tablet, TAKE 1 TABLET BY MOUTH EVERY DAY, Disp: 90 tablet, Rfl: 1  •  Menthol-Methyl Salicylate (Icy Hot) 04-29 % STCK, Apply 1 application  topically daily as needed for pain, Disp: , Rfl:   •  metFORMIN (GLUCOPHAGE) 1000 MG tablet, Take 1 tablet (1,000 mg total) by mouth 2 (two) times a day with meals, Disp: 180 tablet, Rfl: 3  •  Multiple Vitamin (MULTIVITAMIN) tablet, Take 1 tablet by mouth daily, Disp: , Rfl:   •  VITAMIN B COMPLEX-C CAPS, Take 1 capsule by mouth daily, Disp: , Rfl:   Allergies   Allergen Reactions   • Penicillins      Other reaction(s): Other (See Comments)  Unknown  Other reaction(s): Other (See Comments)  Unknown  Other reaction(s): Unknown  Category:  Allergy;      Vitals:    05/26/23 0817   BP: 138/68   BP Location: Left arm   Patient Position: Sitting   Cuff Size: Standard   Pulse: 96   Weight: 82 2 kg (181 lb 3 2 oz)   Height: 5' 9\" (1 753 m)       Labs:  Appointment on 05/15/2023   Component Date Value   • Prostate Specific Antige* 05/15/2023 5 0 (H)    • " PSA, Free 05/15/2023 0 97    • PSA, Free Pct 05/15/2023 19 4    • Sodium 05/15/2023 135    • Potassium 05/15/2023 4 3    • Chloride 05/15/2023 111 (H)    • CO2 05/15/2023 24    • ANION GAP 05/15/2023 0 (L)    • BUN 05/15/2023 21    • Creatinine 05/15/2023 0 94    • Glucose, Fasting 05/15/2023 117 (H)    • Calcium 05/15/2023 9 5    • AST 05/15/2023 16    • ALT 05/15/2023 25    • Alkaline Phosphatase 05/15/2023 68    • Total Protein 05/15/2023 7 6    • Albumin 05/15/2023 4 1    • Total Bilirubin 05/15/2023 0 49    • eGFR 05/15/2023 84    • Cholesterol 05/15/2023 127    • Triglycerides 05/15/2023 117    • HDL, Direct 05/15/2023 40    • LDL Calculated 05/15/2023 64    • Hemoglobin A1C 05/15/2023 6 7 (H)    • EAG 05/15/2023 146    Admission on 04/30/2023, Discharged on 05/01/2023   Component Date Value   • Ventricular Rate 04/30/2023 92    • Atrial Rate 04/30/2023 92    • WA Interval 04/30/2023 172    • QRSD Interval 04/30/2023 102    • QT Interval 04/30/2023 340    • QTC Interval 04/30/2023 420    • P Axis 04/30/2023 61    • QRS Axis 04/30/2023 66    • T Wave Axis 04/30/2023 56    • POC Glucose 04/30/2023 135    • WBC 04/30/2023 11 75 (H)    • RBC 04/30/2023 5 76 (H)    • Hemoglobin 04/30/2023 16 7    • Hematocrit 04/30/2023 51 3 (H)    • MCV 04/30/2023 89    • MCH 04/30/2023 29 0    • MCHC 04/30/2023 32 6    • RDW 04/30/2023 14 6    • MPV 04/30/2023 9 2    • Platelets 13/06/4286 340    • nRBC 04/30/2023 0    • Neutrophils Relative 04/30/2023 43    • Immat GRANS % 04/30/2023 0    • Lymphocytes Relative 04/30/2023 42    • Monocytes Relative 04/30/2023 12    • Eosinophils Relative 04/30/2023 2    • Basophils Relative 04/30/2023 1    • Neutrophils Absolute 04/30/2023 5 20    • Immature Grans Absolute 04/30/2023 0 04    • Lymphocytes Absolute 04/30/2023 4 90 (H)    • Monocytes Absolute 04/30/2023 1 36 (H)    • Eosinophils Absolute 04/30/2023 0 19    • Basophils Absolute 04/30/2023 0 06    • Sodium 04/30/2023 137    • Potassium 04/30/2023 3 8    • Chloride 04/30/2023 104    • CO2 04/30/2023 25    • ANION GAP 04/30/2023 8    • BUN 04/30/2023 27 (H)    • Creatinine 04/30/2023 1 19    • Glucose 04/30/2023 141 (H)    • Calcium 04/30/2023 10 4 (H)    • AST 04/30/2023 15    • ALT 04/30/2023 17    • Alkaline Phosphatase 04/30/2023 80    • Total Protein 04/30/2023 7 7    • Albumin 04/30/2023 4 6    • Total Bilirubin 04/30/2023 0 43    • eGFR 04/30/2023 63    • PTT 04/30/2023 28    • Protime 04/30/2023 12 6    • INR 04/30/2023 0 93    • LA size 05/01/2023 3 3    • Triscuspid Valve Regurgi* 05/01/2023 21 0    • Tricuspid valve peak reg* 05/01/2023 2 27    • LVPWd 05/01/2023 1 00    • Left Atrium Area-systoli* 05/01/2023 13 8    • Left Atrium Area-systoli* 05/01/2023 12 7    • MV E' Tissue Velocity Se* 05/01/2023 9    • Tricuspid annular plane * 05/01/2023 2 10    • TR Peak Marquis 05/01/2023 2 3    • IVSd 05/01/2023 7 49    • LV DIASTOLIC VOLUME (MOD* 61/43/2880 85    • LEFT VENTRICLE SYSTOLIC * 50/32/3976 30    • Left ventricular stroke * 05/01/2023 56 00    • A4C EF 05/01/2023 71    • LA length (A2C) 05/01/2023 4 70    • LVIDd 05/01/2023 4 30    • IVS 05/01/2023 1    • LVIDS 05/01/2023 2 80    • FS 05/01/2023 35    • Asc Ao 05/01/2023 3 4    • Ao root 05/01/2023 3 80    • RVID d 05/01/2023 4 0    • MV valve area p 1/2 meth* 05/01/2023 2 44    • E wave deceleration time 05/01/2023 309    • MV Peak E Marquis 05/01/2023 68    • MV Peak A Marquis 05/01/2023 0 96    • RAA A4C 05/01/2023 12 2    • MV stenosis pressure 1/2* 05/01/2023 90    • LVSV, 2D 05/01/2023 56    • LV EF 05/01/2023 65    • POC Glucose 04/30/2023 177 (H)    • POC Glucose 04/30/2023 180 (H)    • POC Glucose 04/30/2023 128    • Sodium 05/01/2023 137    • Potassium 05/01/2023 4 2    • Chloride 05/01/2023 105    • CO2 05/01/2023 25    • ANION GAP 05/01/2023 7    • BUN 05/01/2023 19    • Creatinine 05/01/2023 0 78    • Glucose 05/01/2023 123    • Glucose, Fasting 05/01/2023 123 (H)    • Calcium 05/01/2023 9 6    • eGFR 05/01/2023 94    • WBC 05/01/2023 11 92 (H)    • RBC 05/01/2023 5 37    • Hemoglobin 05/01/2023 15 3    • Hematocrit 05/01/2023 48 4    • MCV 05/01/2023 90    • MCH 05/01/2023 28 5    • MCHC 05/01/2023 31 6    • RDW 05/01/2023 14 6    • MPV 05/01/2023 9 2    • Platelets 75/59/4008 312    • nRBC 05/01/2023 0    • Neutrophils Relative 05/01/2023 56    • Immat GRANS % 05/01/2023 1    • Lymphocytes Relative 05/01/2023 32    • Monocytes Relative 05/01/2023 9    • Eosinophils Relative 05/01/2023 2    • Basophils Relative 05/01/2023 0    • Neutrophils Absolute 05/01/2023 6 73    • Immature Grans Absolute 05/01/2023 0 06    • Lymphocytes Absolute 05/01/2023 3 78    • Monocytes Absolute 05/01/2023 1 12    • Eosinophils Absolute 05/01/2023 0 19    • Basophils Absolute 05/01/2023 0 04    • Cholesterol 05/01/2023 112    • Triglycerides 05/01/2023 135    • HDL, Direct 05/01/2023 32 (L)    • LDL Calculated 05/01/2023 53    • Hemoglobin A1C 05/01/2023 6 6 (H)    • EAG 05/01/2023 143    • POC Glucose 05/01/2023 106    • POC Glucose 05/01/2023 131    Office Visit on 02/26/2023   Component Date Value   • SARS-CoV-2 02/26/2023 Positive (A)    • INFLUENZA A PCR 02/26/2023 Negative    • INFLUENZA B PCR 02/26/2023 Negative      Lab Results   Component Value Date    HDL 40 05/15/2023    TRIG 117 05/15/2023     Imaging: Echo complete w/ contrast if indicated    Result Date: 5/1/2023  Narrative: •  Left Ventricle: Left ventricular cavity size is normal  Wall thickness is normal  The left ventricular ejection fraction is 65%   Systolic function is normal  Wall motion is normal  Diastolic function is normal  •  Atrial Septum: There is a patent foramen ovale confirmed at rest and with provocation (cough and abdominal compression) with predominant right to left shunting using saline contrast      MRI brain wo contrast    Result Date: 4/30/2023  Narrative: MRI BRAIN WITHOUT CONTRAST INDICATION: Stroke Like symptoms    COMPARISON:   CT from earlier the same day  TECHNIQUE:  Multiplanar, multisequence imaging of the brain was performed  IMAGE QUALITY:  Diagnostic  FINDINGS: BRAIN PARENCHYMA: There is no evidence for large acute vascular distribution infarct  There is no midline shift  There is no acute intracranial hemorrhage  There is mild chronic microvascular scheming change  There is a focus of diffusion weighted hyperintensity within the periventricular white matter adjacent to the posterior body of the right lateral ventricle without discrete corresponding low ADC signal  There is also a punctate focus of diffusion weighted hyperintensity within the right frontal lobe on series 3, image 23  Findings likely represent recent/subacute lacunar infarcts  VENTRICLES:  Normal for the patient's age  SELLA AND PITUITARY GLAND:  Normal  ORBITS:  Normal  PARANASAL SINUSES:  Normal  VASCULATURE:  Evaluation of the major intracranial vasculature demonstrates appropriate flow voids  CALVARIUM AND SKULL BASE: There is right mastoid air cell effusion  EXTRACRANIAL SOFT TISSUES: There is retrodental pannus formation  Impression: Recent/subacute appearing lacunar infarcts in the right frontal lobe as well as right periventricular white matter  Otherwise no evidence for a large acute vascular distribution infarct and no acute intracranial hemorrhage  Mild chronic microvascular ischemic change  The study was marked in [facility] for immediate notification  Workstation performed: [id number]     CTA head and neck with and without contrast    Result Date: 4/30/2023  Narrative: CTA NECK AND BRAIN WITH AND WITHOUT CONTRAST INDICATION: CVA like sx COMPARISON:   None  TECHNIQUE:  Routine CT imaging of the Brain without contrast   Post contrast imaging was performed after administration of iodinated contrast through the neck and brain  Post contrast axial 0 625 mm images timed to opacify the arterial system   3D rendering was performed on an independent workstation  MIP reconstructions performed  Coronal reconstructions were performed of the noncontrast portion of the brain  Radiation dose length product (DLP) for this visit:  2170 mGy-cm   This examination, like all CT scans performed in the [facility], was performed utilizing techniques to minimize radiation dose exposure, including the use of iterative reconstruction and automated exposure control  IV Contrast:  85 mL of iohexol (OMNIPAQUE) IMAGE QUALITY:   Diagnostic FINDINGS: NONCONTRAST BRAIN PARENCHYMA:  No intracranial mass, mass effect or midline shift  No CT signs of acute infarction  No acute parenchymal hemorrhage  VENTRICLES AND EXTRA-AXIAL SPACES:  Normal for the patient's age  VISUALIZED ORBITS: Normal visualized orbits  PARANASAL SINUSES: Normal visualized paranasal sinuses  CERVICAL VASCULATURE AORTIC ARCH AND GREAT VESSELS:  Mild atherosclerotic disease of the arch, proximal great vessels and visualized subclavian vessels  No significant stenosis  RIGHT VERTEBRAL ARTERY CERVICAL SEGMENT:  Normal origin  The vessel is normal in caliber throughout the neck  LEFT VERTEBRAL ARTERY CERVICAL SEGMENT:  Normal origin  The vessel is normal in caliber throughout the neck  RIGHT EXTRACRANIAL CAROTID SEGMENT:  Mild atherosclerotic disease of the distal common carotid artery and proximal cervical internal carotid artery without significant stenosis compared to the more distal ICA  LEFT EXTRACRANIAL CAROTID SEGMENT: Mild atherosclerotic disease of the distal common carotid artery and proximal cervical internal carotid artery without significant stenosis compared to the more distal ICA  NASCET criteria was used to determine the degree of internal carotid artery diameter stenosis  INTRACRANIAL VASCULATURE INTERNAL CAROTID ARTERIES:  Normal enhancement of the intracranial portions of the internal carotid arteries  Normal ophthalmic artery origins  Normal ICA terminus  Atherosclerotic plaque along the cavernous segments ANTERIOR CIRCULATION:  Symmetric A1 segments and anterior cerebral arteries with normal enhancement  Normal anterior communicating artery  MIDDLE CEREBRAL ARTERY CIRCULATION:  M1 segment and middle cerebral artery branches demonstrate normal enhancement bilaterally  DISTAL VERTEBRAL ARTERIES:  Normal distal vertebral arteries  Posterior inferior cerebellar artery origins are normal  Normal vertebral basilar junction  BASILAR ARTERY:  Basilar artery is normal in caliber  Normal superior cerebellar arteries  POSTERIOR CEREBRAL ARTERIES: Both posterior cerebral arteries arises from the basilar tip  Both arteries demonstrate normal enhancement  Normal posterior communicating arteries  VENOUS STRUCTURES:  Normal  NON VASCULAR ANATOMY BONY STRUCTURES:  No acute osseous abnormality  SOFT TISSUES OF THE NECK:  Unremarkable  THORACIC INLET:  Normal      Impression: No mass effect, acute intracranial hemorrhage or CT evidence for a large acute vascular distribution infarct  No evidence for high-grade stenosis, focal occlusion or vascular aneurysm of the cervical or intracranial vasculature  Atherosclerotic plaque at the carotid bulbs without evidence for high-grade stenosis or focal occlusion of the internal carotid arteries  Workstation performed: [id number]       Review of Systems:  Review of Systems   Constitutional: Negative for activity change, appetite change, fatigue and fever  HENT: Negative for nosebleeds and sore throat  Eyes: Negative for photophobia and visual disturbance  Respiratory: Negative for cough, chest tightness, shortness of breath and wheezing  Cardiovascular: Negative for chest pain, palpitations and leg swelling  Gastrointestinal: Negative for abdominal pain, diarrhea, nausea and vomiting  Endocrine: Negative for polyuria  Genitourinary: Negative for dysuria, frequency and hematuria     Musculoskeletal: Negative for arthralgias, "back pain and gait problem  Skin: Negative for pallor and rash  Neurological: Positive for light-headedness  Negative for dizziness, syncope and speech difficulty  Hematological: Does not bruise/bleed easily  Psychiatric/Behavioral: Negative for agitation, behavioral problems and confusion  Physical Exam:  Physical Exam  Vitals reviewed  Constitutional:       General: He is not in acute distress  Appearance: He is well-developed  He is not diaphoretic  HENT:      Head: Normocephalic and atraumatic  Nose: Nose normal    Eyes:      General: No scleral icterus  Pupils: Pupils are equal, round, and reactive to light  Neck:      Vascular: No JVD  Cardiovascular:      Rate and Rhythm: Normal rate and regular rhythm  Heart sounds: S1 normal and S2 normal  Heart sounds not distant  No murmur heard  No systolic murmur is present  No friction rub  No gallop  No S3 sounds  Pulmonary:      Effort: Pulmonary effort is normal  No respiratory distress  Breath sounds: Normal breath sounds  No wheezing or rales  Abdominal:      General: Bowel sounds are normal  There is no distension  Palpations: Abdomen is soft  Musculoskeletal:         General: No deformity  Cervical back: Normal range of motion and neck supple  Skin:     General: Skin is warm and dry  Findings: No erythema  Neurological:      Mental Status: He is alert and oriented to person, place, and time  Cranial Nerves: No cranial nerve deficit  Psychiatric:         Behavior: Behavior normal        Blood pressure 138/68, pulse 96, height 5' 9\" (1 753 m), weight 82 2 kg (181 lb 3 2 oz)  EKG:  Normal sinus rhythm  Normal ECG    Discussion/Summary:  Postural dizziness: Possibly related to autonomic dysfunction in the setting of type 2 diabetes  Normal baseline ECG  Echocardiogram performed in May 2023 revealed normal ejection fraction with normal diastology    PFO was noted with saline " contrast injection  We will check Zio monitor to assess for cardiac rhythm abnormalities  With cardiac risk factors including type 2 diabetes mellitus, hypertension, hyperlipidemia, age, male, current smoker-we will screen for ischemic disease with stress testing  HTN: Essentially at goal currently on amlodipine, hydrochlorothiazide, and lisinopril  Hyperlipidemia: Continued on statin  LDL noted to be 64 in May 2023, well controlled  PFO with prior TIA: Continued on aspirin

## 2023-05-26 NOTE — LETTER
May 26, 2023     Collette Fallow, [address] [place] [address] [address]    Patient: Neelima Cleaning   YOB: 1955   Date of Visit: 5/26/2023       Dear Dr Ramos Marcano: Thank you for referring Cheryl Bryant to me for evaluation  Below are my notes for this consultation  If you have questions, please do not hesitate to call me  I look forward to following your patient along with you  Sincerely,        Mariusz Ness MD        CC: No Recipients    Mariusz Ness MD  5/26/2023  8:26 AM  Incomplete                                             Cardiology Consultation     Neelima Cleaning  [id number]  1955  [facility] [address] [id number] [phone]    No diagnosis found  Chief Complaint   Patient presents with   • New Patient Visit     Est  Care    • Dizziness     Occurs with positional changes     HPI: Patient is here for cardiac evaluation of postural dizziness  No reported chest pain, shortness of breath, palpitations, syncope, LE edema, orthopnea, PND, or significant weight changes  Patient remains active without any increased fatigue out of the ordinary  Patient Active Problem List   Diagnosis   • Diabetes 1 5, managed as type 2 ([facility] )   • Seasonal allergic rhinitis   • Encounter for hepatitis C screening test for low risk patient   • Dyslipidemia   • Essential hypertension   • Diabetes mellitus type 2   • Need for 23-polyvalent pneumococcal polysaccharide vaccine   • Overweight (BMI 25 0-29  9)   • Tinea pedis of left foot   • Essential hypertension   • Hypertriglyceridemia   • Tobacco abuse   • Chronic obstructive pulmonary disease, unspecified COPD type (HCC)   • Leukocytosis   • Diarrhea   • Screening for HIV (human immunodeficiency virus)   • Wellness examination   • Chronic low back pain   • Neck pain   • Increased prostate specific antigen (PSA) velocity   • TIA (transient ischemic attack)   • Stroke, lacunar [facility])     Past Medical History:   Diagnosis Date   • Adenocarcinoma of prostate ([facility] )     [id number]  LAST ASSESSED   • Diabetes mellitus ([facility] )    • Hypertension      Social History     Socioeconomic History   • Marital status: /Civil Union     Spouse name: Not on file   • Number of children: Not on file   • Years of education: Not on file   • Highest education level: Not on file   Occupational History   • Not on file   Tobacco Use   • Smoking status: Every Day     Packs/day: 1 00     Types: Cigarettes   • Smokeless tobacco: Never   Vaping Use   • Vaping Use: Never used   Substance and Sexual Activity   • Alcohol use: Not Currently   • Drug use: No   • Sexual activity: Yes   Other Topics Concern   • Not on file   Social History Narrative    COPY OF ADVANCE DIRECTIVE OBTAINED     Social Determinants of Health     Financial Resource Strain: Not on file   Food Insecurity: No Food Insecurity (5/1/2023)    Hunger Vital Sign    • Worried About Running Out of Food in the Last Year: Never true    • Ran Out of Food in the Last Year: Never true   Transportation Needs: No Transportation Needs (5/1/2023)    PRAPARE - Transportation    • Lack of Transportation (Medical): No    • Lack of Transportation (Non-Medical):  No   Physical Activity: Not on file   Stress: Not on file   Social Connections: Not on file   Intimate Partner Violence: Not on file   Housing Stability: Low Risk  (5/1/2023)    Housing Stability Vital Sign    • Unable to Pay for Housing in the Last Year: No    • Number of Places Lived in the Last Year: 1    • Unstable Housing in the Last Year: No      Family History   Problem Relation Age of Onset   • Diabetes Father         MELLITUS     Past Surgical History:   Procedure Laterality Date   • COLON SURGERY     • HERNIA REPAIR     • SHOULDER SURGERY     • TONSILLECTOMY     • TONSILLECTOMY AND ADENOIDECTOMY         Current Outpatient Medications:   •  albuterol (PROVENTIL "HFA,VENTOLIN HFA) 90 mcg/act inhaler, TAKE 2 PUFFS BY MOUTH EVERY 6 HOURS AS NEEDED FOR WHEEZE, Disp: 1 g, Rfl: 1  •  amLODIPine (NORVASC) 10 mg tablet, TAKE 1 TABLET BY MOUTH EVERY DAY, Disp: 90 tablet, Rfl: 1  •  aspirin 81 mg chewable tablet, Chew 1 tablet (81 mg total) daily Do not start before May 2, 2023 , Disp: 30 tablet, Rfl: 0  •  atorvastatin (LIPITOR) 40 mg tablet, Take 1 tablet (40 mg total) by mouth daily, Disp: 90 tablet, Rfl: 3  •  Cholecalciferol (VITAMIN D3) 1000 units CAPS, Take 1 capsule by mouth daily, Disp: , Rfl:   •  cyanocobalamin (VITAMIN B-12) 1,000 mcg tablet, Take by mouth daily, Disp: , Rfl:   •  hydrochlorothiazide (MICROZIDE) 12 5 mg capsule, TAKE 1 CAPSULE BY MOUTH EVERY DAY, Disp: 90 capsule, Rfl: 1  •  lisinopril (ZESTRIL) 30 mg tablet, TAKE 1 TABLET BY MOUTH EVERY DAY, Disp: 90 tablet, Rfl: 1  •  Menthol-Methyl Salicylate (Icy Hot) 75-62 % STCK, Apply 1 application  topically daily as needed for pain, Disp: , Rfl:   •  metFORMIN (GLUCOPHAGE) 1000 MG tablet, Take 1 tablet (1,000 mg total) by mouth 2 (two) times a day with meals, Disp: 180 tablet, Rfl: 3  •  Multiple Vitamin (MULTIVITAMIN) tablet, Take 1 tablet by mouth daily, Disp: , Rfl:   •  VITAMIN B COMPLEX-C CAPS, Take 1 capsule by mouth daily, Disp: , Rfl:   Allergies   Allergen Reactions   • Penicillins      Other reaction(s): Other (See Comments)  Unknown  Other reaction(s): Other (See Comments)  Unknown  Other reaction(s): Unknown  Category:  Allergy;      Vitals:    05/26/23 0817   BP: 138/68   BP Location: Left arm   Patient Position: Sitting   Cuff Size: Standard   Pulse: 96   Weight: 82 2 kg (181 lb 3 2 oz)   Height: 5' 9\" (1 753 m)       Labs:  Appointment on 05/15/2023   Component Date Value   • Prostate Specific Antige* 05/15/2023 5 0 (H)    • PSA, Free 05/15/2023 0 97    • PSA, Free Pct 05/15/2023 19 4    • Sodium 05/15/2023 135    • Potassium 05/15/2023 4 3    • Chloride 05/15/2023 111 (H)    • CO2 05/15/2023 24  " • ANION GAP 05/15/2023 0 (L)    • BUN 05/15/2023 21    • Creatinine 05/15/2023 0 94    • Glucose, Fasting 05/15/2023 117 (H)    • Calcium 05/15/2023 9 5    • AST 05/15/2023 16    • ALT 05/15/2023 25    • Alkaline Phosphatase 05/15/2023 68    • Total Protein 05/15/2023 7 6    • Albumin 05/15/2023 4 1    • Total Bilirubin 05/15/2023 0 49    • eGFR 05/15/2023 84    • Cholesterol 05/15/2023 127    • Triglycerides 05/15/2023 117    • HDL, Direct 05/15/2023 40    • LDL Calculated 05/15/2023 64    • Hemoglobin A1C 05/15/2023 6 7 (H)    • EAG 05/15/2023 146    Admission on 04/30/2023, Discharged on 05/01/2023   Component Date Value   • Ventricular Rate 04/30/2023 92    • Atrial Rate 04/30/2023 92    • MA Interval 04/30/2023 172    • QRSD Interval 04/30/2023 102    • QT Interval 04/30/2023 340    • QTC Interval 04/30/2023 420    • P Axis 04/30/2023 61    • QRS Axis 04/30/2023 66    • T Wave Axis 04/30/2023 56    • POC Glucose 04/30/2023 135    • WBC 04/30/2023 11 75 (H)    • RBC 04/30/2023 5 76 (H)    • Hemoglobin 04/30/2023 16 7    • Hematocrit 04/30/2023 51 3 (H)    • MCV 04/30/2023 89    • MCH 04/30/2023 29 0    • MCHC 04/30/2023 32 6    • RDW 04/30/2023 14 6    • MPV 04/30/2023 9 2    • Platelets 19/73/8696 340    • nRBC 04/30/2023 0    • Neutrophils Relative 04/30/2023 43    • Immat GRANS % 04/30/2023 0    • Lymphocytes Relative 04/30/2023 42    • Monocytes Relative 04/30/2023 12    • Eosinophils Relative 04/30/2023 2    • Basophils Relative 04/30/2023 1    • Neutrophils Absolute 04/30/2023 5 20    • Immature Grans Absolute 04/30/2023 0 04    • Lymphocytes Absolute 04/30/2023 4 90 (H)    • Monocytes Absolute 04/30/2023 1 36 (H)    • Eosinophils Absolute 04/30/2023 0 19    • Basophils Absolute 04/30/2023 0 06    • Sodium 04/30/2023 137    • Potassium 04/30/2023 3 8    • Chloride 04/30/2023 104    • CO2 04/30/2023 25    • ANION GAP 04/30/2023 8    • BUN 04/30/2023 27 (H)    • Creatinine 04/30/2023 1 19    • Glucose 04/30/2023 141 (H)    • Calcium 04/30/2023 10 4 (H)    • AST 04/30/2023 15    • ALT 04/30/2023 17    • Alkaline Phosphatase 04/30/2023 80    • Total Protein 04/30/2023 7 7    • Albumin 04/30/2023 4 6    • Total Bilirubin 04/30/2023 0 43    • eGFR 04/30/2023 63    • PTT 04/30/2023 28    • Protime 04/30/2023 12 6    • INR 04/30/2023 0 93    • LA size 05/01/2023 3 3    • Triscuspid Valve Regurgi* 05/01/2023 21 0    • Tricuspid valve peak reg* 05/01/2023 2 27    • LVPWd 05/01/2023 1 00    • Left Atrium Area-systoli* 05/01/2023 13 8    • Left Atrium Area-systoli* 05/01/2023 12 7    • MV E' Tissue Velocity Se* 05/01/2023 9    • Tricuspid annular plane * 05/01/2023 2 10    • TR Peak Marquis 05/01/2023 2 3    • IVSd 05/01/2023 3 61    • LV DIASTOLIC VOLUME (MOD* 58/93/2630 85    • LEFT VENTRICLE SYSTOLIC * 28/00/9183 30    • Left ventricular stroke * 05/01/2023 56 00    • A4C EF 05/01/2023 71    • LA length (A2C) 05/01/2023 4 70    • LVIDd 05/01/2023 4 30    • IVS 05/01/2023 1    • LVIDS 05/01/2023 2 80    • FS 05/01/2023 35    • Asc Ao 05/01/2023 3 4    • Ao root 05/01/2023 3 80    • RVID d 05/01/2023 4 0    • MV valve area p 1/2 meth* 05/01/2023 2 44    • E wave deceleration time 05/01/2023 309    • MV Peak E Marquis 05/01/2023 68    • MV Peak A Marquis 05/01/2023 0 96    • RAA A4C 05/01/2023 12 2    • MV stenosis pressure 1/2* 05/01/2023 90    • LVSV, 2D 05/01/2023 56    • LV EF 05/01/2023 65    • POC Glucose 04/30/2023 177 (H)    • POC Glucose 04/30/2023 180 (H)    • POC Glucose 04/30/2023 128    • Sodium 05/01/2023 137    • Potassium 05/01/2023 4 2    • Chloride 05/01/2023 105    • CO2 05/01/2023 25    • ANION GAP 05/01/2023 7    • BUN 05/01/2023 19    • Creatinine 05/01/2023 0 78    • Glucose 05/01/2023 123    • Glucose, Fasting 05/01/2023 123 (H)    • Calcium 05/01/2023 9 6    • eGFR 05/01/2023 94    • WBC 05/01/2023 11 92 (H)    • RBC 05/01/2023 5 37    • Hemoglobin 05/01/2023 15 3    • Hematocrit 05/01/2023 48 4    • MCV 05/01/2023 90    • MCH 05/01/2023 28 5    • MCHC 05/01/2023 31 6    • RDW 05/01/2023 14 6    • MPV 05/01/2023 9 2    • Platelets 18/24/1248 312    • nRBC 05/01/2023 0    • Neutrophils Relative 05/01/2023 56    • Immat GRANS % 05/01/2023 1    • Lymphocytes Relative 05/01/2023 32    • Monocytes Relative 05/01/2023 9    • Eosinophils Relative 05/01/2023 2    • Basophils Relative 05/01/2023 0    • Neutrophils Absolute 05/01/2023 6 73    • Immature Grans Absolute 05/01/2023 0 06    • Lymphocytes Absolute 05/01/2023 3 78    • Monocytes Absolute 05/01/2023 1 12    • Eosinophils Absolute 05/01/2023 0 19    • Basophils Absolute 05/01/2023 0 04    • Cholesterol 05/01/2023 112    • Triglycerides 05/01/2023 135    • HDL, Direct 05/01/2023 32 (L)    • LDL Calculated 05/01/2023 53    • Hemoglobin A1C 05/01/2023 6 6 (H)    • EAG 05/01/2023 143    • POC Glucose 05/01/2023 106    • POC Glucose 05/01/2023 131    Office Visit on 02/26/2023   Component Date Value   • SARS-CoV-2 02/26/2023 Positive (A)    • INFLUENZA A PCR 02/26/2023 Negative    • INFLUENZA B PCR 02/26/2023 Negative      Lab Results   Component Value Date    HDL 40 05/15/2023    TRIG 117 05/15/2023     Imaging: Echo complete w/ contrast if indicated    Result Date: 5/1/2023  Narrative: •  Left Ventricle: Left ventricular cavity size is normal  Wall thickness is normal  The left ventricular ejection fraction is 65%  Systolic function is normal  Wall motion is normal  Diastolic function is normal  •  Atrial Septum: There is a patent foramen ovale confirmed at rest and with provocation (cough and abdominal compression) with predominant right to left shunting using saline contrast      MRI brain wo contrast    Result Date: 4/30/2023  Narrative: MRI BRAIN WITHOUT CONTRAST INDICATION: Stroke Like symptoms    COMPARISON:   CT from earlier the same day  TECHNIQUE:  Multiplanar, multisequence imaging of the brain was performed  IMAGE QUALITY:  Diagnostic   FINDINGS: BRAIN PARENCHYMA: There is no evidence for large acute vascular distribution infarct  There is no midline shift  There is no acute intracranial hemorrhage  There is mild chronic microvascular scheming change  There is a focus of diffusion weighted hyperintensity within the periventricular white matter adjacent to the posterior body of the right lateral ventricle without discrete corresponding low ADC signal  There is also a punctate focus of diffusion weighted hyperintensity within the right frontal lobe on series 3, image 23  Findings likely represent recent/subacute lacunar infarcts  VENTRICLES:  Normal for the patient's age  SELLA AND PITUITARY GLAND:  Normal  ORBITS:  Normal  PARANASAL SINUSES:  Normal  VASCULATURE:  Evaluation of the major intracranial vasculature demonstrates appropriate flow voids  CALVARIUM AND SKULL BASE: There is right mastoid air cell effusion  EXTRACRANIAL SOFT TISSUES: There is retrodental pannus formation  Impression: Recent/subacute appearing lacunar infarcts in the right frontal lobe as well as right periventricular white matter  Otherwise no evidence for a large acute vascular distribution infarct and no acute intracranial hemorrhage  Mild chronic microvascular ischemic change  The study was marked in [facility] for immediate notification  Workstation performed: [id number]     CTA head and neck with and without contrast    Result Date: 4/30/2023  Narrative: CTA NECK AND BRAIN WITH AND WITHOUT CONTRAST INDICATION: CVA like sx COMPARISON:   None  TECHNIQUE:  Routine CT imaging of the Brain without contrast   Post contrast imaging was performed after administration of iodinated contrast through the neck and brain  Post contrast axial 0 625 mm images timed to opacify the arterial system  3D rendering was performed on an independent workstation  MIP reconstructions performed  Coronal reconstructions were performed of the noncontrast portion of the brain   Radiation dose length product (DLP) for this visit:  2170 mGy-cm   This examination, like all CT scans performed in the [facility], was performed utilizing techniques to minimize radiation dose exposure, including the use of iterative reconstruction and automated exposure control  IV Contrast:  85 mL of iohexol (OMNIPAQUE) IMAGE QUALITY:   Diagnostic FINDINGS: NONCONTRAST BRAIN PARENCHYMA:  No intracranial mass, mass effect or midline shift  No CT signs of acute infarction  No acute parenchymal hemorrhage  VENTRICLES AND EXTRA-AXIAL SPACES:  Normal for the patient's age  VISUALIZED ORBITS: Normal visualized orbits  PARANASAL SINUSES: Normal visualized paranasal sinuses  CERVICAL VASCULATURE AORTIC ARCH AND GREAT VESSELS:  Mild atherosclerotic disease of the arch, proximal great vessels and visualized subclavian vessels  No significant stenosis  RIGHT VERTEBRAL ARTERY CERVICAL SEGMENT:  Normal origin  The vessel is normal in caliber throughout the neck  LEFT VERTEBRAL ARTERY CERVICAL SEGMENT:  Normal origin  The vessel is normal in caliber throughout the neck  RIGHT EXTRACRANIAL CAROTID SEGMENT:  Mild atherosclerotic disease of the distal common carotid artery and proximal cervical internal carotid artery without significant stenosis compared to the more distal ICA  LEFT EXTRACRANIAL CAROTID SEGMENT: Mild atherosclerotic disease of the distal common carotid artery and proximal cervical internal carotid artery without significant stenosis compared to the more distal ICA  NASCET criteria was used to determine the degree of internal carotid artery diameter stenosis  INTRACRANIAL VASCULATURE INTERNAL CAROTID ARTERIES:  Normal enhancement of the intracranial portions of the internal carotid arteries  Normal ophthalmic artery origins  Normal ICA terminus  Atherosclerotic plaque along the cavernous segments ANTERIOR CIRCULATION:  Symmetric A1 segments and anterior cerebral arteries with normal enhancement    Normal anterior communicating "artery  MIDDLE CEREBRAL ARTERY CIRCULATION:  M1 segment and middle cerebral artery branches demonstrate normal enhancement bilaterally  DISTAL VERTEBRAL ARTERIES:  Normal distal vertebral arteries  Posterior inferior cerebellar artery origins are normal  Normal vertebral basilar junction  BASILAR ARTERY:  Basilar artery is normal in caliber  Normal superior cerebellar arteries  POSTERIOR CEREBRAL ARTERIES: Both posterior cerebral arteries arises from the basilar tip  Both arteries demonstrate normal enhancement  Normal posterior communicating arteries  VENOUS STRUCTURES:  Normal  NON VASCULAR ANATOMY BONY STRUCTURES:  No acute osseous abnormality  SOFT TISSUES OF THE NECK:  Unremarkable  THORACIC INLET:  Normal      Impression: No mass effect, acute intracranial hemorrhage or CT evidence for a large acute vascular distribution infarct  No evidence for high-grade stenosis, focal occlusion or vascular aneurysm of the cervical or intracranial vasculature  Atherosclerotic plaque at the carotid bulbs without evidence for high-grade stenosis or focal occlusion of the internal carotid arteries  Workstation performed: [id number]       Review of Systems:  Review of Systems    Physical Exam:  Physical Exam  Blood pressure 138/68, pulse 96, height 5' 9\" (1 753 m), weight 82 2 kg (181 lb 3 2 oz)  EKG:  Normal sinus rhythm  Normal ECG    Discussion/Summary:  Postural dizziness: Possibly related to autonomic dysfunction in the setting of type 2 diabetes  Normal baseline ECG  Echocardiogram performed in May 2023 revealed normal ejection fraction with normal diastology  PFO was noted with saline contrast injection  We will check Zio monitor to assess for cardiac rhythm abnormalities  With cardiac risk factors including type 2 diabetes mellitus, hypertension, hyperlipidemia, age, male, current smoker-we will screen for ischemic disease with stress testing      HTN: Essentially at goal currently on amlodipine, " hydrochlorothiazide, and lisinopril  Hyperlipidemia: Continued on statin  LDL noted to be 64 in May 2023, well controlled  PFO with prior TIA: Continued on aspirin

## 2023-06-02 ENCOUNTER — TELEPHONE (OUTPATIENT)
Dept: NEUROLOGY | Facility: CLINIC | Age: 68
End: 2023-06-02

## 2023-06-02 NOTE — TELEPHONE ENCOUNTER
Post CVA Discharge Follow Up  Hospitalization: 4/30/23-5/1/23    Called patient  Since discharge, he denies experiencing any new or worsening stroke-like symptoms  Patient denies the presence of any residual stroke symptoms following hospitalization and claims he has returned to baseline functioning  He is ambulating independently as well as preforming his own ADLs  Patient manages his own medications, appointments, and affairs  Reviewed appointments - patient successfully followed up with PCP and cardiology  He is wearing the zio patch  Reminded patient of his upcoming neurology hospital follow up  Reviewed medications with him  Reports he is taking as prescribed with no medication side effects or signs of bleeding  As for risk factors, patient reports monitoring his BP at home and how it typically is in the 665N-583Y for systolic and in the 98K for diastolic  He is in the process of smoking cessation  Encouraged patient to follow a low salt / low cholesterol / diabetic friendly diet  All of his questions were addressed  At the conclusion of the conversation, patient denies having any further questions or concerns

## 2023-06-09 ENCOUNTER — TELEPHONE (OUTPATIENT)
Dept: NEUROLOGY | Facility: CLINIC | Age: 68
End: 2023-06-09

## 2023-06-09 ENCOUNTER — HOSPITAL ENCOUNTER (OUTPATIENT)
Dept: NON INVASIVE DIAGNOSTICS | Facility: CLINIC | Age: 68
Discharge: HOME | End: 2023-06-09
Payer: MEDICARE

## 2023-06-09 VITALS
BODY MASS INDEX: 26.81 KG/M2 | WEIGHT: 181 LBS | DIASTOLIC BLOOD PRESSURE: 80 MMHG | OXYGEN SATURATION: 96 % | SYSTOLIC BLOOD PRESSURE: 152 MMHG | HEART RATE: 76 BPM | HEIGHT: 69 IN

## 2023-06-09 DIAGNOSIS — E78.5 DYSLIPIDEMIA: ICD-10-CM

## 2023-06-09 DIAGNOSIS — R55 SYNCOPE AND COLLAPSE: ICD-10-CM

## 2023-06-09 DIAGNOSIS — E11.9 TYPE 2 DIABETES MELLITUS WITHOUT COMPLICATION, WITHOUT LONG-TERM CURRENT USE OF INSULIN (HCC): ICD-10-CM

## 2023-06-09 DIAGNOSIS — R42 POSTURAL DIZZINESS: ICD-10-CM

## 2023-06-09 DIAGNOSIS — I10 PRIMARY HYPERTENSION: ICD-10-CM

## 2023-06-09 DIAGNOSIS — G45.9 TIA (TRANSIENT ISCHEMIC ATTACK): ICD-10-CM

## 2023-06-09 LAB
ARRHY DURING EX: NORMAL
CHEST PAIN STATEMENT: NORMAL
MAX DIASTOLIC BP: 90 MMHG
MAX HEART RATE: 136 BPM
MAX HR PERCENT: 88 %
MAX HR: 136 BPM
MAX PREDICTED HEART RATE: 154 BPM
MAX. SYSTOLIC BP: 182 MMHG
NUC STRESS EJECTION FRACTION: 70 %
PROTOCOL NAME: NORMAL
RATE PRESSURE PRODUCT: NORMAL
REASON FOR TERMINATION: NORMAL
SL CV REST NUCLEAR ISOTOPE DOSE: 10.67 MCI
SL CV STRESS NUCLEAR ISOTOPE DOSE: 31 MCI
SL CV STRESS RECOVERY BP: NORMAL MMHG
SL CV STRESS RECOVERY HR: 100 BPM
SL CV STRESS RECOVERY O2 SAT: 96 %
SL CV STRESS STAGE REACHED: 2
STRESS ANGINA INDEX: 0
STRESS BASELINE BP: NORMAL MMHG
STRESS BASELINE HR: 76 BPM
STRESS O2 SAT REST: 96 %
STRESS PEAK HR: 136 BPM
STRESS POST ESTIMATED WORKLOAD: 7 METS
STRESS POST EXERCISE DUR MIN: 6 MIN
STRESS POST EXERCISE DUR SEC: 0 SEC
STRESS POST O2 SAT PEAK: 96 %
STRESS POST PEAK BP: 182 MMHG
STRESS/REST PERFUSION RATIO: 0.98
TARGET HR FORMULA: NORMAL
TEST INDICATION: NORMAL
TIME IN EXERCISE PHASE: NORMAL

## 2023-06-09 PROCEDURE — 78452 HT MUSCLE IMAGE SPECT MULT: CPT

## 2023-06-09 PROCEDURE — 93018 CV STRESS TEST I&R ONLY: CPT | Performed by: INTERNAL MEDICINE

## 2023-06-09 PROCEDURE — 93016 CV STRESS TEST SUPVJ ONLY: CPT | Performed by: INTERNAL MEDICINE

## 2023-06-09 PROCEDURE — 78452 HT MUSCLE IMAGE SPECT MULT: CPT | Performed by: INTERNAL MEDICINE

## 2023-06-09 PROCEDURE — 93017 CV STRESS TEST TRACING ONLY: CPT

## 2023-06-09 PROCEDURE — A9502 TC99M TETROFOSMIN: HCPCS

## 2023-06-09 PROCEDURE — G1004 CDSM NDSC: HCPCS

## 2023-06-13 ENCOUNTER — OFFICE VISIT (OUTPATIENT)
Dept: NEUROLOGY | Facility: CLINIC | Age: 68
End: 2023-06-13
Payer: MEDICARE

## 2023-06-13 VITALS
DIASTOLIC BLOOD PRESSURE: 80 MMHG | WEIGHT: 187 LBS | HEART RATE: 70 BPM | HEIGHT: 69 IN | BODY MASS INDEX: 27.7 KG/M2 | SYSTOLIC BLOOD PRESSURE: 130 MMHG

## 2023-06-13 DIAGNOSIS — I63.81 STROKE, LACUNAR (HCC): ICD-10-CM

## 2023-06-13 DIAGNOSIS — G45.9 TIA (TRANSIENT ISCHEMIC ATTACK): ICD-10-CM

## 2023-06-13 PROCEDURE — 99215 OFFICE O/P EST HI 40 MIN: CPT | Performed by: PSYCHIATRY & NEUROLOGY

## 2023-06-13 NOTE — PROGRESS NOTES
"Patient ID: Florentin Nicole is a 78 y o  male  Assessment/Plan:    No problem-specific Assessment & Plan notes found for this encounter  {Assess/PlanSmartLinks:67803}       Subjective:    HPI    {[facility] HPI texts:32056}    {Common ambulatory SmartLinks:61302}         Objective:    Blood pressure 130/80, pulse 70, height 5' 9\" (1 753 m), weight 84 8 kg (187 lb)  Physical Exam    Neurological Exam      ROS:  Review of Systems   Constitutional: Negative  Negative for appetite change and fever  HENT: Positive for tinnitus  Negative for hearing loss, trouble swallowing and voice change  Eyes: Positive for visual disturbance  Negative for photophobia and pain  Respiratory: Negative  Negative for shortness of breath  Cardiovascular: Negative  Negative for palpitations  Gastrointestinal: Negative  Negative for nausea and vomiting  Endocrine: Positive for cold intolerance  Genitourinary: Positive for frequency and urgency  Negative for dysuria  Musculoskeletal: Positive for back pain, gait problem and neck pain  Negative for myalgias  Skin: Negative  Negative for rash  Allergic/Immunologic: Negative  Neurological: Positive for weakness, numbness and headaches  Negative for dizziness, tremors, seizures, syncope, facial asymmetry, speech difficulty and light-headedness  Patient stated that he has headaches that comes and go  Patient also stated that he has right side numbness and weakness  Hematological: Bruises/bleeds easily  Psychiatric/Behavioral: Negative  Negative for confusion, hallucinations and sleep disturbance     "

## 2023-06-13 NOTE — PROGRESS NOTES
Patient ID: Celso Nicholas is a 78 y o  male  Assessment/Plan:  TIA (transient ischemic attack)  Assessment:  Patient is a pleasant 31-year-old male that presents to the neurology clinic as hospital follow-up for TIA  The patient's initial presenting symptoms were word finding difficulties, and left upper extremity weakness/numbness that resolved within a couple of minutes, and has since hospitalization not recurred  The patient had a CT of the head completed which did not demonstrate any acute intracranial abnormalities  An MRI brain demonstrated recent/subacute appearing lacunar infarction in the right frontal lobe as well as right periventricular white matter and mild chronic microvascular ischemic change  The patient subsequently had a nuclear medicine cardiac stress test completed in the setting of a PFO that was found during stroke work-up, and is following with cardiology for dysrhythmia work-up  From a neurological standpoint the patient has not demonstrated any new neurologic deficits, is compliant on aspirin and atorvastatin  Plan:  - Case discussed with attending neurologist Dr Zane Campuzano  - Continue Atorvastatin 40mg PO QD  - Continue Aspirin 81mg PO QD  - Follow up with cardiology for further evaluation of arrhythmias/ PFO closure discussions  - Patient to call office for any new symptoms, questions, or concerns  - Follow up with Dr Zane Campuzano in 6 months  Diagnoses and all orders for this visit:    TIA (transient ischemic attack)  -     Ambulatory referral to Neurology    Stroke, [facility])  -     Ambulatory referral to Neurology         Subjective:  Patient is a 31-year-old male who originally presented to the hospital on April 30 due to an episode of left upper extremity weakness/numbness and word finding difficulty while driving    It is reported by the patient's wife that the symptoms resolved within 1 minute, and the patient's wife said that she did not note any facial drooping, tonic-clonic movements at the time  The patient denied any bowel bladder incontinence or confusion  The patient arrived to the emergency department within 20 minutes of symptom onset, stroke alert was subsequently called, and tenecteplase was not administered due to symptom resolution  The patient denies any similar symptoms in the past   The patient had a CT of the head completed which did not demonstrate any acute intracranial abnormalities, and EKG was negative for acute ischemia, and mild leukocytosis was found on baseline serum studies  The patient was subsequently admitted for stroke work-up  An MRI brain demonstrated recent/subacute appearing lacunar infarction in the right frontal lobe as well as right periventricular white matter and mild chronic microvascular ischemic change  The patient was also placed on telemetry which did not demonstrated events  The patient was placed on aspirin 81 mg p o  daily and atorvastatin 40 mg p o  daily  An echocardiogram was completed which demonstrated a patent foramen ovale that was confirmed at rest and with provocation using agitated saline  The patient was counseled on smoking cessation, provided with cardiology and neurology follow-ups at discharge  Since hospitalization the patient reports that he has been feeling well, and denies any new symptoms  The patient reports that he has been cutting down on his smoking, where he usually smokes 1 pack/day, and is now down to around 3 cigarettes/day  The patient had a stress test completed that the results are displayed below  The patient continues to go on with his daily life and daily routine, and reports no further symptoms, and reports there are no remaining symptoms or recrudescence of any symptoms  The patient denies any new hospitalizations, new surgeries, new injuries, new changes to medications, trips, falls, or any changes to sensorium    The patient currently takes atorvastatin and aspirin as "prescribed and the patient does not miss doses  The patient also had a Holter monitor placed that was just recently mailed back, the patient does have a cardiology follow-up visit scheduled to follow-up on the Holter monitor  NM STRESS TEST RESULTS:  •  Stress ECG: Overall, the patient's exercise capacity was borderline low for their age  The patient reached stage 2 0 of the protocol after exercising for 6 min and 0 sec and had a maximal HR of 136 bpm (88 % of MPHR) and 7 0 METS  Blood pressure demonstrated a normal response and heart rate demonstrated a normal response to stress  The patient's heart rate recovery was normal   •  Resting ECG: ECG is normal  The ECG shows normal sinus rhythm  Resting ECG shows no ST-segment deviation  •  Stress ECG: No ST deviation is noted  Arrhythmias during stress: frequent PVCs with couplets  There were no arrhythmias during recovery  The stress ECG is negative for ischemia after maximal exercise, without reproduction of symptoms  •  Stress Combined Conclusion: There is image artifact, without diagnostic evidence for perfusion abnormality  •  Perfusion: There are no ischemic perfusion defects  Prone imaging was utilized  •  Stress Function: Left ventricular function post-stress is normal  Stress ejection fraction is >70 %   --- Overall normal exercise myocardial perfusion study  Patient demonstrated borderline exercise capacity but without symptoms or ECG/imaging evidence of ischemia  The following portions of the patient's history were reviewed and updated as appropriate: allergies, current medications, past family history, past medical history, past social history, past surgical history and problem list     Objective:  Blood pressure 130/80, pulse 70, height 5' 9\" (1 753 m), weight 84 8 kg (187 lb)  Physical Exam  Vitals and nursing note reviewed  HENT:      Head: Normocephalic and atraumatic        Right Ear: Hearing normal       Left Ear: Hearing normal       " Mouth/Throat:      Mouth: Mucous membranes are moist       Pharynx: Oropharynx is clear  No oropharyngeal exudate  Eyes:      General: Lids are normal       Extraocular Movements: Extraocular movements intact  Conjunctiva/sclera: Conjunctivae normal       Pupils: Pupils are equal, round, and reactive to light  Cardiovascular:      Rate and Rhythm: Normal rate  Pulses: Normal pulses  Abdominal:      General: There is no distension  Musculoskeletal:         General: Normal range of motion  Cervical back: Normal range of motion  Skin:     General: Skin is warm  Neurological:      Mental Status: He is alert  Mental status is at baseline  Motor: Motor strength is normal      Deep Tendon Reflexes:      Reflex Scores:       Tricep reflexes are 2+ on the right side and 2+ on the left side  Bicep reflexes are 2+ on the right side and 2+ on the left side  Brachioradialis reflexes are 2+ on the right side and 2+ on the left side  Patellar reflexes are 2+ on the right side and 2+ on the left side  Achilles reflexes are 2+ on the right side and 2+ on the left side  Psychiatric:         Mood and Affect: Mood normal          Speech: Speech normal        Neurological Exam  Mental Status  Awake, alert and oriented to person, place and time  Alert  Speech is normal  Language is fluent with no aphasia  Attention and concentration are normal     Cranial Nerves  CN II: Right normal visual field  Left normal visual field  Right funduscopic exam: not visualized  Left funduscopic exam: not visualized  CN III, IV, VI: Extraocular movements intact bilaterally  Normal lids and orbits bilaterally  Pupils equal round and reactive to light bilaterally  Right pupil: 3 mm  Round  Reactive to light  Reactive to accommodation  Left pupil: 3 mm  Round  Reactive to light  Reactive to accommodation  CN V:  Right: Facial sensation is normal   Left: Facial sensation is normal on the left    CN VII:  Right: There is no facial weakness  Left: There is no facial weakness  CN VIII:  Right: Hearing is normal   Left: Hearing is normal   CN IX, X: Palate elevates symmetrically  CN XI:  Right: Sternocleidomastoid strength is normal  Trapezius strength is normal   Left: Sternocleidomastoid strength is normal  Trapezius strength is normal   CN XII: Tongue midline without atrophy or fasciculations  Motor  Normal muscle bulk throughout  No fasciculations present  Normal muscle tone  No abnormal involuntary movements  Strength is 5/5 throughout all four extremities  Sensory  Light touch is normal in upper and lower extremities  Vibration is normal in upper and lower extremities  Reflexes                                            Right                      Left  Brachioradialis                    2+                         2+  Biceps                                 2+                         2+  Triceps                                2+                         2+  Patellar                                2+                         2+  Achilles                                2+                         2+    Right pathological reflexes: Crossed adductor absent  Ankle clonus absent  Left pathological reflexes: Crossed adductor absent  Ankle clonus absent  Coordination  Right: Finger-to-nose normal  Rapid alternating movement normal Left: Finger-to-nose normal  Rapid alternating movement normal     Gait  Casual gait is normal including stance, stride, and arm swing  Normal tandem gait  Able to rise from chair without using arms  ROS:  Review of Systems   Constitutional: Negative  Negative for appetite change and fever  HENT: Positive for tinnitus  Negative for hearing loss, trouble swallowing and voice change  Eyes: Negative for photophobia, pain and visual disturbance  Respiratory: Negative  Negative for shortness of breath  Cardiovascular: Negative  Negative for palpitations  Gastrointestinal: Negative  Negative for nausea and vomiting  Endocrine: Positive for cold intolerance  Genitourinary: Positive for frequency and urgency  Negative for dysuria  Musculoskeletal: Positive for back pain  Negative for gait problem, myalgias and neck pain  Skin: Negative  Negative for rash  Allergic/Immunologic: Negative  Neurological: Positive for headaches  Negative for dizziness, tremors, seizures, syncope, facial asymmetry, speech difficulty, weakness, light-headedness and numbness  Hematological: Bruises/bleeds easily  Psychiatric/Behavioral: Negative  Negative for confusion, hallucinations and sleep disturbance

## 2023-06-13 NOTE — ASSESSMENT & PLAN NOTE
Assessment:  Patient is a pleasant 71-year-old male that presents to the neurology clinic as hospital follow-up for TIA  The patient's initial presenting symptoms were word finding difficulties, and left upper extremity weakness/numbness that resolved within a couple of minutes, and has since hospitalization not recurred  The patient had a CT of the head completed which did not demonstrate any acute intracranial abnormalities  An MRI brain demonstrated recent/subacute appearing lacunar infarction in the right frontal lobe as well as right periventricular white matter and mild chronic microvascular ischemic change  The patient subsequently had a nuclear medicine cardiac stress test completed in the setting of a PFO that was found during stroke work-up, and is following with cardiology for dysrhythmia work-up  From a neurological standpoint the patient has not demonstrated any new neurologic deficits, is compliant on aspirin and atorvastatin  Plan:  - Case discussed with attending neurologist Dr Alan Prado  - Continue Atorvastatin 40mg PO QD  - Continue Aspirin 81mg PO QD  - Follow up with cardiology for further evaluation of arrhythmias/ PFO closure discussions  - Patient to call office for any new symptoms, questions, or concerns  - Follow up with Dr Alan Prado in 6 months

## 2023-06-16 ENCOUNTER — TELEPHONE (OUTPATIENT)
Dept: CARDIOLOGY CLINIC | Facility: CLINIC | Age: 68
End: 2023-06-16

## 2023-06-16 NOTE — TELEPHONE ENCOUNTER
Called pt, no answer  Detailed message was left with results  Left call back number incase pt has questions

## 2023-06-16 NOTE — TELEPHONE ENCOUNTER
----- Message from Nelson Murillo sent at 6/14/2023 10:46 AM EDT -----    ----- Message -----  From: Genoveva Lerma MD  Sent: 6/13/2023   9:30 AM EDT  To: Cardiology [facility]    Results are within range, please notify patient

## 2023-06-19 ENCOUNTER — CLINICAL SUPPORT (OUTPATIENT)
Dept: CARDIOLOGY CLINIC | Facility: CLINIC | Age: 68
End: 2023-06-19
Payer: MEDICARE

## 2023-06-19 DIAGNOSIS — E78.5 DYSLIPIDEMIA: ICD-10-CM

## 2023-06-19 DIAGNOSIS — G45.9 TIA (TRANSIENT ISCHEMIC ATTACK): ICD-10-CM

## 2023-06-19 DIAGNOSIS — R42 POSTURAL DIZZINESS: ICD-10-CM

## 2023-06-19 DIAGNOSIS — I10 PRIMARY HYPERTENSION: ICD-10-CM

## 2023-06-19 DIAGNOSIS — E11.9 TYPE 2 DIABETES MELLITUS WITHOUT COMPLICATION, WITHOUT LONG-TERM CURRENT USE OF INSULIN (HCC): ICD-10-CM

## 2023-06-19 PROCEDURE — 93248 EXT ECG>7D<15D REV&INTERPJ: CPT | Performed by: INTERNAL MEDICINE

## 2023-06-23 ENCOUNTER — OFFICE VISIT (OUTPATIENT)
Dept: CARDIOLOGY CLINIC | Facility: CLINIC | Age: 68
End: 2023-06-23
Payer: MEDICARE

## 2023-06-23 VITALS
HEIGHT: 69 IN | SYSTOLIC BLOOD PRESSURE: 146 MMHG | BODY MASS INDEX: 26.84 KG/M2 | DIASTOLIC BLOOD PRESSURE: 62 MMHG | WEIGHT: 181.2 LBS | HEART RATE: 85 BPM

## 2023-06-23 DIAGNOSIS — G45.9 TIA (TRANSIENT ISCHEMIC ATTACK): Primary | ICD-10-CM

## 2023-06-23 DIAGNOSIS — Q21.12 PFO (PATENT FORAMEN OVALE): ICD-10-CM

## 2023-06-23 DIAGNOSIS — I10 PRIMARY HYPERTENSION: ICD-10-CM

## 2023-06-23 DIAGNOSIS — E78.5 DYSLIPIDEMIA: ICD-10-CM

## 2023-06-23 PROCEDURE — 99214 OFFICE O/P EST MOD 30 MIN: CPT | Performed by: INTERNAL MEDICINE

## 2023-06-23 RX ORDER — GLUCOSA SU 2KCL/CHONDROITIN SU 500-400 MG
CAPSULE ORAL
COMMUNITY

## 2023-06-23 NOTE — PROGRESS NOTES
Cardiology Consultation     Franci Bravo  [id number]  1955  [facility] [address] [id number] [phone]    1  TIA (transient ischemic attack)        2  PFO (patent foramen ovale)        3  Primary hypertension        4  Dyslipidemia           Chief Complaint   Patient presents with   • Follow-up     1 month   • Dizziness     X1 getting up too quickly      HPI: Patient is here for cardiac evaluation of postural dizziness  No significant change to symptoms since last visit  No reported chest pain, shortness of breath, palpitations, syncope, LE edema, orthopnea, PND, or significant weight changes  Patient remains active without any increased fatigue out of the ordinary  Patient Active Problem List   Diagnosis   • Diabetes 1 5, managed as type 2 ([place] [address] )   • Seasonal allergic rhinitis   • Encounter for hepatitis C screening test for low risk patient   • Dyslipidemia   • Essential hypertension   • Diabetes mellitus type 2   • Need for 23-polyvalent pneumococcal polysaccharide vaccine   • Overweight (BMI 25 0-29  9)   • Tinea pedis of left foot   • Essential hypertension   • Hypertriglyceridemia   • Tobacco abuse   • Chronic obstructive pulmonary disease, unspecified COPD type (HCC)   • Leukocytosis   • Diarrhea   • Screening for HIV (human immunodeficiency virus)   • Wellness examination   • Chronic low back pain   • Neck pain   • Increased prostate specific antigen (PSA) velocity   • TIA (transient ischemic attack)   • Stroke, lacunar (HCC)   • Postural dizziness   • PFO (patent foramen ovale)     Past Medical History:   Diagnosis Date   • Adenocarcinoma of prostate ([place] [address] )     [id number]  LAST ASSESSED   • Diabetes mellitus ([place] [address] )    • Hypertension      Social History     Socioeconomic History   • Marital status: /Civil Union     Spouse name: Not on file   • Number of children: Not on file   • Years of education: Not on file   • Highest education level: Not on file   Occupational History   • Not on file   Tobacco Use   • Smoking status: Every Day     Packs/day: 1 00     Types: Cigarettes   • Smokeless tobacco: Never   Vaping Use   • Vaping Use: Never used   Substance and Sexual Activity   • Alcohol use: Not Currently   • Drug use: No   • Sexual activity: Yes   Other Topics Concern   • Not on file   Social History Narrative    COPY OF ADVANCE DIRECTIVE OBTAINED     Social Determinants of Health     Financial Resource Strain: Not on file   Food Insecurity: No Food Insecurity (5/1/2023)    Hunger Vital Sign    • Worried About Running Out of Food in the Last Year: Never true    • Ran Out of Food in the Last Year: Never true   Transportation Needs: No Transportation Needs (5/1/2023)    PRAPARE - Transportation    • Lack of Transportation (Medical): No    • Lack of Transportation (Non-Medical):  No   Physical Activity: Not on file   Stress: Not on file   Social Connections: Not on file   Intimate Partner Violence: Not on file   Housing Stability: Low Risk  (5/1/2023)    Housing Stability Vital Sign    • Unable to Pay for Housing in the Last Year: No    • Number of Places Lived in the Last Year: 1    • Unstable Housing in the Last Year: No      Family History   Problem Relation Age of Onset   • Diabetes Father         MELLITUS     Past Surgical History:   Procedure Laterality Date   • COLON SURGERY     • HERNIA REPAIR     • SHOULDER SURGERY     • TONSILLECTOMY     • TONSILLECTOMY AND ADENOIDECTOMY         Current Outpatient Medications:   •  albuterol (PROVENTIL HFA,VENTOLIN HFA) 90 mcg/act inhaler, TAKE 2 PUFFS BY MOUTH EVERY 6 HOURS AS NEEDED FOR WHEEZE, Disp: 1 g, Rfl: 1  •  amLODIPine (NORVASC) 10 mg tablet, TAKE 1 TABLET BY MOUTH EVERY DAY, Disp: 90 tablet, Rfl: 1  •  aspirin 81 mg chewable tablet, Chew 1 tablet (81 mg total) daily Do not start before May 2, 2023 , Disp: 30 tablet, "Rfl: 0  •  atorvastatin (LIPITOR) 40 mg tablet, Take 1 tablet (40 mg total) by mouth daily, Disp: 90 tablet, Rfl: 3  •  Cholecalciferol (VITAMIN D3) 1000 units CAPS, Take 1 capsule by mouth daily, Disp: , Rfl:   •  Coenzyme Q10 (Co Q10) 100 MG CAPS, Take by mouth, Disp: , Rfl:   •  cyanocobalamin (VITAMIN B-12) 1,000 mcg tablet, Take by mouth daily, Disp: , Rfl:   •  hydrochlorothiazide (MICROZIDE) 12 5 mg capsule, TAKE 1 CAPSULE BY MOUTH EVERY DAY, Disp: 90 capsule, Rfl: 1  •  lisinopril (ZESTRIL) 30 mg tablet, TAKE 1 TABLET BY MOUTH EVERY DAY, Disp: 90 tablet, Rfl: 1  •  Menthol-Methyl Salicylate (Icy Hot) 86-46 % STCK, Apply 1 application  topically daily as needed for pain, Disp: , Rfl:   •  metFORMIN (GLUCOPHAGE) 1000 MG tablet, Take 1 tablet (1,000 mg total) by mouth 2 (two) times a day with meals, Disp: 180 tablet, Rfl: 3  •  Multiple Vitamin (MULTIVITAMIN) tablet, Take 1 tablet by mouth daily, Disp: , Rfl:   •  VITAMIN B COMPLEX-C CAPS, Take 1 capsule by mouth daily, Disp: , Rfl:   Allergies   Allergen Reactions   • Penicillins      Other reaction(s): Other (See Comments)  Unknown  Other reaction(s): Other (See Comments)  Unknown  Other reaction(s): Unknown  Category:  Allergy;      Vitals:    06/23/23 1336   BP: 146/62   BP Location: Left arm   Patient Position: Sitting   Cuff Size: Standard   Pulse: 85   Weight: 82 2 kg (181 lb 3 2 oz)   Height: 5' 9\" (1 753 m)       Labs:  Hospital Outpatient Visit on 06/09/2023   Component Date Value   • Baseline HR 06/09/2023 76    • Baseline BP 06/09/2023 152/80    • O2 sat rest 06/09/2023 96    • Stress peak HR 06/09/2023 136    • Post peak BP 06/09/2023 182    • Rate Pressure Product 06/09/2023 24,752 0    • O2 sat peak 06/09/2023 96    • Recovery HR 06/09/2023 100    • Recovery BP 06/09/2023 152/82    • O2 sat recovery 06/09/2023 96    • Max HR 06/09/2023 136    • Max HR Percent 06/09/2023 88    • Exercise duration (min) 06/09/2023 6    • Exercise duration (sec) " 06/09/2023 0    • Estimated workload 06/09/2023 7 0    • Angina Index 06/09/2023 0    • Stress Stage Reached 06/09/2023 2 0    • Rest Nuclear Isotope Dose 06/09/2023 10 67    • Stress Nuclear Isotope D* 06/09/2023 31 00    • Stress/rest perfusion ra* 06/09/2023 0 98    • EF (%) 06/09/2023 70    • Protocol Name 06/09/2023 MILY    • Time In Exercise Phase 06/09/2023 00:06:00    • MAX   SYSTOLIC BP 30/34/4548 207    • Max Diastolic Bp 62/89/3730 90    • Max Heart Rate 06/09/2023 136    • Max Predicted Heart Rate 06/09/2023 154    • Reason for Termination 06/09/2023 Fatigue    • Test Indication 06/09/2023 Screening for CAD    • Target Hr Formular 06/09/2023 (220 - Age)*100%    • Arrhy During Ex 06/09/2023 ventricular premature beats-pairs    • Chest Pain Statement 06/09/2023 none    Appointment on 05/15/2023   Component Date Value   • Prostate Specific Antige* 05/15/2023 5 0 (H)    • PSA, Free 05/15/2023 0 97    • PSA, Free Pct 05/15/2023 19 4    • Sodium 05/15/2023 135    • Potassium 05/15/2023 4 3    • Chloride 05/15/2023 111 (H)    • CO2 05/15/2023 24    • ANION GAP 05/15/2023 0 (L)    • BUN 05/15/2023 21    • Creatinine 05/15/2023 0 94    • Glucose, Fasting 05/15/2023 117 (H)    • Calcium 05/15/2023 9 5    • AST 05/15/2023 16    • ALT 05/15/2023 25    • Alkaline Phosphatase 05/15/2023 68    • Total Protein 05/15/2023 7 6    • Albumin 05/15/2023 4 1    • Total Bilirubin 05/15/2023 0 49    • eGFR 05/15/2023 84    • Cholesterol 05/15/2023 127    • Triglycerides 05/15/2023 117    • HDL, Direct 05/15/2023 40    • LDL Calculated 05/15/2023 64    • Hemoglobin A1C 05/15/2023 6 7 (H)    • EAG 05/15/2023 146    Admission on 04/30/2023, Discharged on 05/01/2023   Component Date Value   • Ventricular Rate 04/30/2023 92    • Atrial Rate 04/30/2023 92    • NJ Interval 04/30/2023 172    • QRSD Interval 04/30/2023 102    • QT Interval 04/30/2023 340    • QTC Interval 04/30/2023 420    • P Axis 04/30/2023 61    • QRS Axis 04/30/2023 66    • T Wave Axis 04/30/2023 56    • POC Glucose 04/30/2023 135    • WBC 04/30/2023 11 75 (H)    • RBC 04/30/2023 5 76 (H)    • Hemoglobin 04/30/2023 16 7    • Hematocrit 04/30/2023 51 3 (H)    • MCV 04/30/2023 89    • MCH 04/30/2023 29 0    • MCHC 04/30/2023 32 6    • RDW 04/30/2023 14 6    • MPV 04/30/2023 9 2    • Platelets 89/39/6143 340    • nRBC 04/30/2023 0    • Neutrophils Relative 04/30/2023 43    • Immat GRANS % 04/30/2023 0    • Lymphocytes Relative 04/30/2023 42    • Monocytes Relative 04/30/2023 12    • Eosinophils Relative 04/30/2023 2    • Basophils Relative 04/30/2023 1    • Neutrophils Absolute 04/30/2023 5 20    • Immature Grans Absolute 04/30/2023 0 04    • Lymphocytes Absolute 04/30/2023 4 90 (H)    • Monocytes Absolute 04/30/2023 1 36 (H)    • Eosinophils Absolute 04/30/2023 0 19    • Basophils Absolute 04/30/2023 0 06    • Sodium 04/30/2023 137    • Potassium 04/30/2023 3 8    • Chloride 04/30/2023 104    • CO2 04/30/2023 25    • ANION GAP 04/30/2023 8    • BUN 04/30/2023 27 (H)    • Creatinine 04/30/2023 1 19    • Glucose 04/30/2023 141 (H)    • Calcium 04/30/2023 10 4 (H)    • AST 04/30/2023 15    • ALT 04/30/2023 17    • Alkaline Phosphatase 04/30/2023 80    • Total Protein 04/30/2023 7 7    • Albumin 04/30/2023 4 6    • Total Bilirubin 04/30/2023 0 43    • eGFR 04/30/2023 63    • PTT 04/30/2023 28    • Protime 04/30/2023 12 6    • INR 04/30/2023 0 93    • LA size 05/01/2023 3 3    • Triscuspid Valve Regurgi* 05/01/2023 21 0    • Tricuspid valve peak reg* 05/01/2023 2 27    • LVPWd 05/01/2023 1 00    • Left Atrium Area-systoli* 05/01/2023 13 8    • Left Atrium Area-systoli* 05/01/2023 12 7    • MV E' Tissue Velocity Se* 05/01/2023 9    • Tricuspid annular plane * 05/01/2023 2 10    • TR Peak Marquis 05/01/2023 2 3    • IVSd 05/01/2023 9 32    • LV DIASTOLIC VOLUME (MOD* 92/31/1091 85    • LEFT VENTRICLE SYSTOLIC * 63/70/1288 30    • Left ventricular stroke * 05/01/2023 56 00    • A4C EF 05/01/2023 71    • LA length (A2C) 05/01/2023 4 70    • LVIDd 05/01/2023 4 30    • IVS 05/01/2023 1    • LVIDS 05/01/2023 2 80    • FS 05/01/2023 35    • Asc Ao 05/01/2023 3 4    • Ao root 05/01/2023 3 80    • RVID d 05/01/2023 4 0    • MV valve area p 1/2 meth* 05/01/2023 2 44    • E wave deceleration time 05/01/2023 309    • MV Peak E Marquis 05/01/2023 68    • MV Peak A Marquis 05/01/2023 0 96    • RAA A4C 05/01/2023 12 2    • MV stenosis pressure 1/2* 05/01/2023 90    • LVSV, 2D 05/01/2023 56    • LV EF 05/01/2023 65    • POC Glucose 04/30/2023 177 (H)    • POC Glucose 04/30/2023 180 (H)    • POC Glucose 04/30/2023 128    • Sodium 05/01/2023 137    • Potassium 05/01/2023 4 2    • Chloride 05/01/2023 105    • CO2 05/01/2023 25    • ANION GAP 05/01/2023 7    • BUN 05/01/2023 19    • Creatinine 05/01/2023 0 78    • Glucose 05/01/2023 123    • Glucose, Fasting 05/01/2023 123 (H)    • Calcium 05/01/2023 9 6    • eGFR 05/01/2023 94    • WBC 05/01/2023 11 92 (H)    • RBC 05/01/2023 5 37    • Hemoglobin 05/01/2023 15 3    • Hematocrit 05/01/2023 48 4    • MCV 05/01/2023 90    • MCH 05/01/2023 28 5    • MCHC 05/01/2023 31 6    • RDW 05/01/2023 14 6    • MPV 05/01/2023 9 2    • Platelets 02/07/4789 312    • nRBC 05/01/2023 0    • Neutrophils Relative 05/01/2023 56    • Immat GRANS % 05/01/2023 1    • Lymphocytes Relative 05/01/2023 32    • Monocytes Relative 05/01/2023 9    • Eosinophils Relative 05/01/2023 2    • Basophils Relative 05/01/2023 0    • Neutrophils Absolute 05/01/2023 6 73    • Immature Grans Absolute 05/01/2023 0 06    • Lymphocytes Absolute 05/01/2023 3 78    • Monocytes Absolute 05/01/2023 1 12    • Eosinophils Absolute 05/01/2023 0 19    • Basophils Absolute 05/01/2023 0 04    • Cholesterol 05/01/2023 112    • Triglycerides 05/01/2023 135    • HDL, Direct 05/01/2023 32 (L)    • LDL Calculated 05/01/2023 53    • Hemoglobin A1C 05/01/2023 6 6 (H)    • EAG 05/01/2023 143    • POC Glucose 05/01/2023 106    • POC Glucose 05/01/2023 131    Office Visit on 02/26/2023   Component Date Value   • SARS-CoV-2 02/26/2023 Positive (A)    • INFLUENZA A PCR 02/26/2023 Negative    • INFLUENZA B PCR 02/26/2023 Negative      Lab Results   Component Value Date    TRIG 117 05/15/2023    HDL 40 05/15/2023     Imaging: Echo complete w/ contrast if indicated    Result Date: 5/1/2023  Narrative: •  Left Ventricle: Left ventricular cavity size is normal  Wall thickness is normal  The left ventricular ejection fraction is 65%  Systolic function is normal  Wall motion is normal  Diastolic function is normal  •  Atrial Septum: There is a patent foramen ovale confirmed at rest and with provocation (cough and abdominal compression) with predominant right to left shunting using saline contrast      MRI brain wo contrast    Result Date: 4/30/2023  Narrative: MRI BRAIN WITHOUT CONTRAST INDICATION: Stroke Like symptoms    COMPARISON:   CT from earlier the same day  TECHNIQUE:  Multiplanar, multisequence imaging of the brain was performed  IMAGE QUALITY:  Diagnostic  FINDINGS: BRAIN PARENCHYMA: There is no evidence for large acute vascular distribution infarct  There is no midline shift  There is no acute intracranial hemorrhage  There is mild chronic microvascular scheming change  There is a focus of diffusion weighted hyperintensity within the periventricular white matter adjacent to the posterior body of the right lateral ventricle without discrete corresponding low ADC signal  There is also a punctate focus of diffusion weighted hyperintensity within the right frontal lobe on series 3, image 23  Findings likely represent recent/subacute lacunar infarcts  VENTRICLES:  Normal for the patient's age  SELLA AND PITUITARY GLAND:  Normal  ORBITS:  Normal  PARANASAL SINUSES:  Normal  VASCULATURE:  Evaluation of the major intracranial vasculature demonstrates appropriate flow voids  CALVARIUM AND SKULL BASE: There is right mastoid air cell effusion  EXTRACRANIAL SOFT TISSUES: There is retrodental pannus formation  Impression: Recent/subacute appearing lacunar infarcts in the right frontal lobe as well as right periventricular white matter  Otherwise no evidence for a large acute vascular distribution infarct and no acute intracranial hemorrhage  Mild chronic microvascular ischemic change  The study was marked in [facility] for immediate notification  Workstation performed: [id number]     CTA head and neck with and without contrast    Result Date: 4/30/2023  Narrative: CTA NECK AND BRAIN WITH AND WITHOUT CONTRAST INDICATION: CVA like sx COMPARISON:   None  TECHNIQUE:  Routine CT imaging of the Brain without contrast   Post contrast imaging was performed after administration of iodinated contrast through the neck and brain  Post contrast axial 0 625 mm images timed to opacify the arterial system  3D rendering was performed on an independent workstation  MIP reconstructions performed  Coronal reconstructions were performed of the noncontrast portion of the brain  Radiation dose length product (DLP) for this visit:  2170 mGy-cm   This examination, like all CT scans performed in the [facility], was performed utilizing techniques to minimize radiation dose exposure, including the use of iterative reconstruction and automated exposure control  IV Contrast:  85 mL of iohexol (OMNIPAQUE) IMAGE QUALITY:   Diagnostic FINDINGS: NONCONTRAST BRAIN PARENCHYMA:  No intracranial mass, mass effect or midline shift  No CT signs of acute infarction  No acute parenchymal hemorrhage  VENTRICLES AND EXTRA-AXIAL SPACES:  Normal for the patient's age  VISUALIZED ORBITS: Normal visualized orbits  PARANASAL SINUSES: Normal visualized paranasal sinuses  CERVICAL VASCULATURE AORTIC ARCH AND GREAT VESSELS:  Mild atherosclerotic disease of the arch, proximal great vessels and visualized subclavian vessels  No significant stenosis   RIGHT VERTEBRAL ARTERY CERVICAL SEGMENT: Normal origin  The vessel is normal in caliber throughout the neck  LEFT VERTEBRAL ARTERY CERVICAL SEGMENT:  Normal origin  The vessel is normal in caliber throughout the neck  RIGHT EXTRACRANIAL CAROTID SEGMENT:  Mild atherosclerotic disease of the distal common carotid artery and proximal cervical internal carotid artery without significant stenosis compared to the more distal ICA  LEFT EXTRACRANIAL CAROTID SEGMENT: Mild atherosclerotic disease of the distal common carotid artery and proximal cervical internal carotid artery without significant stenosis compared to the more distal ICA  NASCET criteria was used to determine the degree of internal carotid artery diameter stenosis  INTRACRANIAL VASCULATURE INTERNAL CAROTID ARTERIES:  Normal enhancement of the intracranial portions of the internal carotid arteries  Normal ophthalmic artery origins  Normal ICA terminus  Atherosclerotic plaque along the cavernous segments ANTERIOR CIRCULATION:  Symmetric A1 segments and anterior cerebral arteries with normal enhancement  Normal anterior communicating artery  MIDDLE CEREBRAL ARTERY CIRCULATION:  M1 segment and middle cerebral artery branches demonstrate normal enhancement bilaterally  DISTAL VERTEBRAL ARTERIES:  Normal distal vertebral arteries  Posterior inferior cerebellar artery origins are normal  Normal vertebral basilar junction  BASILAR ARTERY:  Basilar artery is normal in caliber  Normal superior cerebellar arteries  POSTERIOR CEREBRAL ARTERIES: Both posterior cerebral arteries arises from the basilar tip  Both arteries demonstrate normal enhancement  Normal posterior communicating arteries  VENOUS STRUCTURES:  Normal  NON VASCULAR ANATOMY BONY STRUCTURES:  No acute osseous abnormality  SOFT TISSUES OF THE NECK:  Unremarkable  THORACIC INLET:  Normal      Impression: No mass effect, acute intracranial hemorrhage or CT evidence for a large acute vascular distribution infarct   No evidence for high-grade stenosis, focal occlusion or vascular aneurysm of the cervical or intracranial vasculature  Atherosclerotic plaque at the carotid bulbs without evidence for high-grade stenosis or focal occlusion of the internal carotid arteries  Workstation performed: [id number]       Review of Systems:  Review of Systems   Constitutional: Negative for activity change, appetite change, fatigue and fever  HENT: Negative for nosebleeds and sore throat  Eyes: Negative for photophobia and visual disturbance  Respiratory: Negative for cough, chest tightness, shortness of breath and wheezing  Cardiovascular: Negative for chest pain, palpitations and leg swelling  Gastrointestinal: Negative for abdominal pain, diarrhea, nausea and vomiting  Endocrine: Negative for polyuria  Genitourinary: Negative for dysuria, frequency and hematuria  Musculoskeletal: Negative for arthralgias, back pain and gait problem  Skin: Negative for pallor and rash  Neurological: Positive for light-headedness  Negative for dizziness, syncope and speech difficulty  Hematological: Does not bruise/bleed easily  Psychiatric/Behavioral: Negative for agitation, behavioral problems and confusion  Physical Exam:  Physical Exam  Vitals reviewed  Constitutional:       General: He is not in acute distress  Appearance: He is well-developed  He is not diaphoretic  HENT:      Head: Normocephalic and atraumatic  Nose: Nose normal    Eyes:      General: No scleral icterus  Pupils: Pupils are equal, round, and reactive to light  Neck:      Vascular: No JVD  Cardiovascular:      Rate and Rhythm: Normal rate and regular rhythm  Heart sounds: S1 normal and S2 normal  Heart sounds not distant  No murmur heard  No systolic murmur is present  No friction rub  No gallop  No S3 sounds  Pulmonary:      Effort: Pulmonary effort is normal  No respiratory distress  Breath sounds: Normal breath sounds   No wheezing or "rales  Abdominal:      General: Bowel sounds are normal  There is no distension  Palpations: Abdomen is soft  Musculoskeletal:         General: No deformity  Cervical back: Normal range of motion and neck supple  Skin:     General: Skin is warm and dry  Findings: No erythema  Neurological:      Mental Status: He is alert and oriented to person, place, and time  Cranial Nerves: No cranial nerve deficit  Psychiatric:         Behavior: Behavior normal        Blood pressure 146/62, pulse 85, height 5' 9\" (1 753 m), weight 82 2 kg (181 lb 3 2 oz)  EKG:  Normal sinus rhythm  Normal ECG    Discussion/Summary:  Postural dizziness: Possibly related to autonomic dysfunction in the setting of type 2 diabetes  Normal baseline ECG  Echocardiogram performed in May 2023 revealed normal ejection fraction with normal diastology  PFO was noted with saline contrast injection  Zio monitor revealed no evidence of A-fib or bradycardia arrhythmias  Considering that he had no atrial enlargement on echo and no valvular disease, unlikely to have afib, so will hold off on LOOP recorder for the time being - especially since he does have a PFO as possible cause of TIA  With cardiac risk factors including type 2 diabetes mellitus, hypertension, hyperlipidemia, age, male, current smoker-we we also screened for ischemic disease with stress testing, which was normal for perfusion defects  HTN: Essentially at goal currently on amlodipine, hydrochlorothiazide, and lisinopril  No changes made today  Hyperlipidemia: Continued on statin  LDL noted to be 64 in May 2023, well controlled  PFO with prior TIA: Continued on aspirin  No changes made today    "

## 2023-08-21 DIAGNOSIS — I10 ESSENTIAL HYPERTENSION: ICD-10-CM

## 2023-08-21 RX ORDER — HYDROCHLOROTHIAZIDE 12.5 MG/1
CAPSULE, GELATIN COATED ORAL
Qty: 90 CAPSULE | Refills: 1 | Status: SHIPPED | OUTPATIENT
Start: 2023-08-21

## 2023-09-25 ENCOUNTER — TELEPHONE (OUTPATIENT)
Age: 68
End: 2023-09-25

## 2023-09-25 NOTE — TELEPHONE ENCOUNTER
Patient called after 10/31/23 appt was cancelled. He suggested that after his blood work, he would call to reschedule. I told him the appt was for his annual Medicare wellness but he wanted to wait until he had his blood work to reschedule.

## 2023-10-05 LAB
LEFT EYE DIABETIC RETINOPATHY: NORMAL
RIGHT EYE DIABETIC RETINOPATHY: NORMAL

## 2023-10-30 ENCOUNTER — APPOINTMENT (OUTPATIENT)
Dept: LAB | Age: 68
End: 2023-10-30
Payer: MEDICARE

## 2023-10-30 DIAGNOSIS — R89.9 ABNORMAL LABORATORY TEST: ICD-10-CM

## 2023-10-30 DIAGNOSIS — E78.5 DYSLIPIDEMIA: ICD-10-CM

## 2023-10-30 DIAGNOSIS — E11.9 TYPE 2 DIABETES MELLITUS WITHOUT COMPLICATION, WITHOUT LONG-TERM CURRENT USE OF INSULIN (HCC): ICD-10-CM

## 2023-10-30 LAB
ALBUMIN SERPL BCP-MCNC: 4.4 G/DL (ref 3.5–5)
ALP SERPL-CCNC: 107 U/L (ref 34–104)
ALT SERPL W P-5'-P-CCNC: 21 U/L (ref 7–52)
ANION GAP SERPL CALCULATED.3IONS-SCNC: 6 MMOL/L
AST SERPL W P-5'-P-CCNC: 14 U/L (ref 13–39)
BILIRUB SERPL-MCNC: 0.78 MG/DL (ref 0.2–1)
BUN SERPL-MCNC: 11 MG/DL (ref 5–25)
CALCIUM SERPL-MCNC: 9.4 MG/DL (ref 8.4–10.2)
CHLORIDE SERPL-SCNC: 107 MMOL/L (ref 96–108)
CHOLEST SERPL-MCNC: 113 MG/DL
CO2 SERPL-SCNC: 26 MMOL/L (ref 21–32)
CREAT SERPL-MCNC: 0.61 MG/DL (ref 0.6–1.3)
CREAT UR-MCNC: 99.2 MG/DL
EST. AVERAGE GLUCOSE BLD GHB EST-MCNC: 154 MG/DL
GFR SERPL CREATININE-BSD FRML MDRD: 104 ML/MIN/1.73SQ M
GLUCOSE P FAST SERPL-MCNC: 116 MG/DL (ref 65–99)
HBA1C MFR BLD: 7 %
HDLC SERPL-MCNC: 35 MG/DL
LDLC SERPL CALC-MCNC: 44 MG/DL (ref 0–100)
MICROALBUMIN UR-MCNC: 53.4 MG/L
MICROALBUMIN/CREAT 24H UR: 54 MG/G CREATININE (ref 0–30)
POTASSIUM SERPL-SCNC: 4.2 MMOL/L (ref 3.5–5.3)
PROT SERPL-MCNC: 7.3 G/DL (ref 6.4–8.4)
SODIUM SERPL-SCNC: 139 MMOL/L (ref 135–147)
TRIGL SERPL-MCNC: 169 MG/DL

## 2023-10-30 PROCEDURE — 80053 COMPREHEN METABOLIC PANEL: CPT

## 2023-10-30 PROCEDURE — 36415 COLL VENOUS BLD VENIPUNCTURE: CPT

## 2023-10-30 PROCEDURE — 82570 ASSAY OF URINE CREATININE: CPT

## 2023-10-30 PROCEDURE — 84165 PROTEIN E-PHORESIS SERUM: CPT

## 2023-10-30 PROCEDURE — 82043 UR ALBUMIN QUANTITATIVE: CPT

## 2023-10-30 PROCEDURE — 80061 LIPID PANEL: CPT

## 2023-10-30 PROCEDURE — 86334 IMMUNOFIX E-PHORESIS SERUM: CPT

## 2023-10-30 PROCEDURE — 83036 HEMOGLOBIN GLYCOSYLATED A1C: CPT

## 2023-11-02 LAB
ALBUMIN SERPL ELPH-MCNC: 4.17 G/DL (ref 3.2–5.1)
ALBUMIN SERPL ELPH-MCNC: 60.5 % (ref 48–70)
ALBUMIN UR ELPH-MCNC: 35.9 %
ALPHA1 GLOB MFR UR ELPH: 2.1 %
ALPHA1 GLOB SERPL ELPH-MCNC: 0.31 G/DL (ref 0.15–0.47)
ALPHA1 GLOB SERPL ELPH-MCNC: 4.5 % (ref 1.8–7)
ALPHA2 GLOB MFR UR ELPH: 16.7 %
ALPHA2 GLOB SERPL ELPH-MCNC: 0.79 G/DL (ref 0.42–1.04)
ALPHA2 GLOB SERPL ELPH-MCNC: 11.5 % (ref 5.9–14.9)
B-GLOBULIN MFR UR ELPH: 1.9 %
BETA GLOB ABNORMAL SERPL ELPH-MCNC: 0.41 G/DL (ref 0.31–0.57)
BETA1 GLOB SERPL ELPH-MCNC: 6 % (ref 4.7–7.7)
BETA2 GLOB SERPL ELPH-MCNC: 4.9 % (ref 3.1–7.9)
BETA2+GAMMA GLOB SERPL ELPH-MCNC: 0.34 G/DL (ref 0.2–0.58)
GAMMA GLOB ABNORMAL SERPL ELPH-MCNC: 0.87 G/DL (ref 0.4–1.66)
GAMMA GLOB MFR UR ELPH: 43.4 %
GAMMA GLOB SERPL ELPH-MCNC: 12.6 % (ref 6.9–22.3)
IGG/ALB SER: 1.53 {RATIO} (ref 1.1–1.8)
INTERPRETATION UR IFE-IMP: NORMAL
INTERPRETATION UR IFE-IMP: NORMAL
PROT PATTERN SERPL ELPH-IMP: NORMAL
PROT PATTERN UR ELPH-IMP: NORMAL
PROT SERPL-MCNC: 6.9 G/DL (ref 6.4–8.2)
PROT UR-MCNC: 15.3 MG/DL

## 2023-11-02 PROCEDURE — 84165 PROTEIN E-PHORESIS SERUM: CPT | Performed by: STUDENT IN AN ORGANIZED HEALTH CARE EDUCATION/TRAINING PROGRAM

## 2023-11-02 PROCEDURE — 84166 PROTEIN E-PHORESIS/URINE/CSF: CPT | Performed by: STUDENT IN AN ORGANIZED HEALTH CARE EDUCATION/TRAINING PROGRAM

## 2023-11-02 PROCEDURE — 86335 IMMUNFIX E-PHORSIS/URINE/CSF: CPT | Performed by: STUDENT IN AN ORGANIZED HEALTH CARE EDUCATION/TRAINING PROGRAM

## 2023-11-02 PROCEDURE — 86334 IMMUNOFIX E-PHORESIS SERUM: CPT | Performed by: STUDENT IN AN ORGANIZED HEALTH CARE EDUCATION/TRAINING PROGRAM

## 2023-11-16 ENCOUNTER — OFFICE VISIT (OUTPATIENT)
Dept: INTERNAL MEDICINE CLINIC | Facility: CLINIC | Age: 68
End: 2023-11-16

## 2023-11-16 VITALS
OXYGEN SATURATION: 96 % | BODY MASS INDEX: 26.17 KG/M2 | DIASTOLIC BLOOD PRESSURE: 84 MMHG | SYSTOLIC BLOOD PRESSURE: 148 MMHG | WEIGHT: 177.2 LBS | HEART RATE: 87 BPM

## 2023-11-16 DIAGNOSIS — Z87.891 PERSONAL HISTORY OF NICOTINE DEPENDENCE: ICD-10-CM

## 2023-11-16 DIAGNOSIS — Z23 ENCOUNTER FOR IMMUNIZATION: Primary | ICD-10-CM

## 2023-11-16 DIAGNOSIS — I10 ESSENTIAL HYPERTENSION: ICD-10-CM

## 2023-11-16 DIAGNOSIS — Z00.00 MEDICARE ANNUAL WELLNESS VISIT, SUBSEQUENT: ICD-10-CM

## 2023-11-16 DIAGNOSIS — F17.200 CURRENT SMOKER: ICD-10-CM

## 2023-11-16 DIAGNOSIS — E11.9 TYPE 2 DIABETES MELLITUS WITHOUT COMPLICATION, WITHOUT LONG-TERM CURRENT USE OF INSULIN (HCC): ICD-10-CM

## 2023-11-16 RX ORDER — LISINOPRIL 40 MG/1
40 TABLET ORAL DAILY
Qty: 90 TABLET | Refills: 0 | Status: SHIPPED | OUTPATIENT
Start: 2023-11-16 | End: 2024-02-14

## 2023-11-16 NOTE — ASSESSMENT & PLAN NOTE
Currently smokes half a pack a day and has been trying to cut down  Previously smoked 1 pack a day for more than 20 years    PLAN:  CT of the chest for screening

## 2023-11-16 NOTE — ASSESSMENT & PLAN NOTE
Lab Results   Component Value Date    HGBA1C 7.0 (H) 10/30/2023     Patient's urine showed albumin random at 53.4  BUN/creatinine ratio at 54  And takes metformin thousand Mg  Hemoglobin A1c was increased from 6.7 to 7  Diabetic Foot exam was performed and was normal    PLAN:  Patient advised to follow strict diet and exercise routine  Patient started on Jardiance  Patient's lisinopril increased to 40 Mg

## 2023-11-16 NOTE — PATIENT INSTRUCTIONS
Medicare Preventive Visit Patient Instructions  Thank you for completing your Welcome to Medicare Visit or Medicare Annual Wellness Visit today. Your next wellness visit will be due in one year (11/16/2024). The screening/preventive services that you may require over the next 5-10 years are detailed below. Some tests may not apply to you based off risk factors and/or age. Screening tests ordered at today's visit but not completed yet may show as past due. Also, please note that scanned in results may not display below. Preventive Screenings:  Service Recommendations Previous Testing/Comments   Colorectal Cancer Screening  Colonoscopy    Fecal Occult Blood Test (FOBT)/Fecal Immunochemical Test (FIT)  Fecal DNA/Cologuard Test  Flexible Sigmoidoscopy Age: 43-73 years old   Colonoscopy: every 10 years (May be performed more frequently if at higher risk)  OR  FOBT/FIT: every 1 year  OR  Cologuard: every 3 years  OR  Sigmoidoscopy: every 5 years  Screening may be recommended earlier than age 39 if at higher risk for colorectal cancer. Also, an individualized decision between you and your healthcare provider will decide whether screening between the ages of 77-80 would be appropriate. Colonoscopy: 01/16/2014  FOBT/FIT: Not on file  Cologuard: Not on file  Sigmoidoscopy: Not on file          Prostate Cancer Screening Individualized decision between patient and health care provider in men between ages of 53-66   Medicare will cover every 12 months beginning on the day after your 50th birthday PSA: 5.0 ng/mL           Hepatitis C Screening Once for adults born between 1945 and 1965  More frequently in patients at high risk for Hepatitis C Hep C Antibody: 01/10/2021        Diabetes Screening 1-2 times per year if you're at risk for diabetes or have pre-diabetes Fasting glucose: 116 mg/dL (10/30/2023)  A1C: 7.0 % (10/30/2023)      Cholesterol Screening Once every 5 years if you don't have a lipid disorder.  May order more often based on risk factors. Lipid panel: 10/30/2023         Other Preventive Screenings Covered by Medicare:  Abdominal Aortic Aneurysm (AAA) Screening: covered once if your at risk. You're considered to be at risk if you have a family history of AAA or a male between the age of 70-76 who smoking at least 100 cigarettes in your lifetime. Lung Cancer Screening: covers low dose CT scan once per year if you meet all of the following conditions: (1) Age 48-67; (2) No signs or symptoms of lung cancer; (3) Current smoker or have quit smoking within the last 15 years; (4) You have a tobacco smoking history of at least 20 pack years (packs per day x number of years you smoked); (5) You get a written order from a healthcare provider. Glaucoma Screening: covered annually if you're considered high risk: (1) You have diabetes OR (2) Family history of glaucoma OR (3)  aged 48 and older OR (3)  American aged 72 and older  Osteoporosis Screening: covered every 2 years if you meet one of the following conditions: (1) Have a vertebral abnormality; (2) On glucocorticoid therapy for more than 3 months; (3) Have primary hyperparathyroidism; (4) On osteoporosis medications and need to assess response to drug therapy. HIV Screening: covered annually if you're between the age of 14-79. Also covered annually if you are younger than 13 and older than 72 with risk factors for HIV infection. For pregnant patients, it is covered up to 3 times per pregnancy.     Immunizations:  Immunization Recommendations   Influenza Vaccine Annual influenza vaccination during flu season is recommended for all persons aged >= 6 months who do not have contraindications   Pneumococcal Vaccine   * Pneumococcal conjugate vaccine = PCV13 (Prevnar 13), PCV15 (Vaxneuvance), PCV20 (Prevnar 20)  * Pneumococcal polysaccharide vaccine = PPSV23 (Pneumovax) Adults 55-16 yo with certain risk factors or if 69+ yo  If never received any pneumonia vaccine: recommend Prevnar 21 (PCV20)  Give PCV20 if previously received 1 dose of PCV13 or PPSV23   Hepatitis B Vaccine 3 dose series if at intermediate or high risk (ex: diabetes, end stage renal disease, liver disease)   Respiratory syncytial virus (RSV) Vaccine - COVERED BY MEDICARE PART D  * RSVPreF3 (Arexvy) CDC recommends that adults 61years of age and older may receive a single dose of RSV vaccine using shared clinical decision-making (SCDM)   Tetanus (Td) Vaccine - COST NOT COVERED BY MEDICARE PART B Following completion of primary series, a booster dose should be given every 10 years to maintain immunity against tetanus. Td may also be given as tetanus wound prophylaxis. Tdap Vaccine - COST NOT COVERED BY MEDICARE PART B Recommended at least once for all adults. For pregnant patients, recommended with each pregnancy. Shingles Vaccine (Shingrix) - COST NOT COVERED BY MEDICARE PART B  2 shot series recommended in those 19 years and older who have or will have weakened immune systems or those 50 years and older     Health Maintenance Due:      Topic Date Due    Colorectal Cancer Screening  01/16/2015    Hepatitis C Screening  Completed     Immunizations Due:      Topic Date Due    COVID-19 Vaccine (1) Never done    Pneumococcal Vaccine: 65+ Years (2 - PCV) 12/14/2019    Influenza Vaccine (1) 09/01/2023     Advance Directives   What are advance directives? Advance directives are legal documents that state your wishes and plans for medical care. These plans are made ahead of time in case you lose your ability to make decisions for yourself. Advance directives can apply to any medical decision, such as the treatments you want, and if you want to donate organs. What are the types of advance directives? There are many types of advance directives, and each state has rules about how to use them. You may choose a combination of any of the following:  Living will:   This is a written record of the treatment you want. You can also choose which treatments you do not want, which to limit, and which to stop at a certain time. This includes surgery, medicine, IV fluid, and tube feedings. Durable power of  for healthcare [facility]): This is a written record that states who you want to make healthcare choices for you when you are unable to make them for yourself. This person, called a proxy, is usually a family member or a friend. You may choose more than 1 proxy. Do not resuscitate (DNR) order:  A DNR order is used in case your heart stops beating or you stop breathing. It is a request not to have certain forms of treatment, such as CPR. A DNR order may be included in other types of advance directives. Medical directive: This covers the care that you want if you are in a coma, near death, or unable to make decisions for yourself. You can list the treatments you want for each condition. Treatment may include pain medicine, surgery, blood transfusions, dialysis, IV or tube feedings, and a ventilator (breathing machine). Values history: This document has questions about your views, beliefs, and how you feel and think about life. This information can help others choose the care that you would choose. Why are advance directives important? An advance directive helps you control your care. Although spoken wishes may be used, it is better to have your wishes written down. Spoken wishes can be misunderstood, or not followed. Treatments may be given even if you do not want them. An advance directive may make it easier for your family to make difficult choices about your care. Cigarette Smoking and Your Health   Risks to your health if you smoke:  Nicotine and other chemicals found in tobacco damage every cell in your body. Even if you are a light smoker, you have an increased risk for cancer, heart disease, and lung disease. If you are pregnant or have diabetes, smoking increases your risk for complications. Benefits to your health if you stop smoking: You decrease respiratory symptoms such as coughing, wheezing, and shortness of breath. You reduce your risk for cancers of the lung, mouth, throat, kidney, bladder, pancreas, stomach, and cervix. If you already have cancer, you increase the benefits of chemotherapy. You also reduce your risk for cancer returning or a second cancer from developing. You reduce your risk for heart disease, blood clots, heart attack, and stroke. You reduce your risk for lung infections, and diseases such as pneumonia, asthma, chronic bronchitis, and emphysema. Your circulation improves. More oxygen can be delivered to your body. If you have diabetes, you lower your risk for complications, such as kidney, artery, and eye diseases. You also lower your risk for nerve damage. Nerve damage can lead to amputations, poor vision, and blindness. You improve your body's ability to heal and to fight infections. For more information and support to stop smoking:   [email or web address]  Phone: [phone]  Web Address: www.[employer]  Weight Management   Why it is important to manage your weight:  Being overweight increases your risk of health conditions such as heart disease, high blood pressure, type 2 diabetes, and certain types of cancer. It can also increase your risk for osteoarthritis, sleep apnea, and other respiratory problems. Aim for a slow, steady weight loss. Even a small amount of weight loss can lower your risk of health problems. How to lose weight safely:  A safe and healthy way to lose weight is to eat fewer calories and get regular exercise. You can lose up about 1 pound a week by decreasing the number of calories you eat by 500 calories each day. Healthy meal plan for weight management:  A healthy meal plan includes a variety of foods, contains fewer calories, and helps you stay healthy. A healthy meal plan includes the following:  Eat whole-grain foods more often.   A healthy meal plan should contain fiber. Fiber is the part of grains, fruits, and vegetables that is not broken down by your body. Whole-grain foods are healthy and provide extra fiber in your diet. Some examples of whole-grain foods are whole-wheat breads and pastas, oatmeal, brown rice, and bulgur. Eat a variety of vegetables every day. Include dark, leafy greens such as spinach, kale, kumar greens, and mustard greens. Eat yellow and orange vegetables such as carrots, sweet potatoes, and winter squash. Eat a variety of fruits every day. Choose fresh or canned fruit (canned in its own juice or light syrup) instead of juice. Fruit juice has very little or no fiber. Eat low-fat dairy foods. Drink fat-free (skim) milk or 1% milk. Eat fat-free yogurt and low-fat cottage cheese. Try low-fat cheeses such as mozzarella and other reduced-fat cheeses. Choose meat and other protein foods that are low in fat. Choose beans or other legumes such as split peas or lentils. Choose fish, skinless poultry (chicken or turkey), or lean cuts of red meat (beef or pork). Before you cook meat or poultry, cut off any visible fat. Use less fat and oil. Try baking foods instead of frying them. Add less fat, such as margarine, sour cream, regular salad dressing and mayonnaise to foods. Eat fewer high-fat foods. Some examples of high-fat foods include french fries, doughnuts, ice cream, and cakes. Eat fewer sweets. Limit foods and drinks that are high in sugar. This includes candy, cookies, regular soda, and sweetened drinks. Exercise:  Exercise at least 30 minutes per day on most days of the week. Some examples of exercise include walking, biking, dancing, and swimming. You can also fit in more physical activity by taking the stairs instead of the elevator or parking farther away from stores. Ask your healthcare provider about the best exercise plan for you.       © Copyright [employer] 2018 Information is for End User's use only and may not be sold, redistributed or otherwise used for commercial purposes.  All illustrations and images included in CareNotes® are the copyrighted property of [employer] or [address] [place] [address]      Please get the formulary from the insurance

## 2023-11-16 NOTE — PROGRESS NOTES
Diabetic Foot Exam    Patient's shoes and socks removed. Right Foot/Ankle   Right Foot Inspection  Skin Exam: skin normal, callus and callus. No dry skin, no warmth, no erythema, no maceration, no abnormal color, no pre-ulcer and no ulcer. Toe Exam: ROM and strength within normal limits. Sensory   Vibration: intact  Proprioception: intact  Monofilament testing: intact    Vascular  Capillary refills: < 3 seconds  The right DP pulse is 2+. The right PT pulse is 2+. Left Foot/Ankle  Left Foot Inspection  Skin Exam: skin normal and callus. No dry skin, no warmth, no erythema, no maceration, normal color, no pre-ulcer and no ulcer. Toe Exam: ROM and strength within normal limits. Sensory   Vibration: intact  Proprioception: intact  Monofilament testing: intact    Vascular  Capillary refills: < 3 seconds  The left DP pulse is 2+. The left PT pulse is 2+.     Assessment and Plan:     Problem List Items Addressed This Visit          Endocrine    Diabetes mellitus type 2       Lab Results   Component Value Date    HGBA1C 7.0 (H) 10/30/2023   Patient's urine showed albumin random at 53.4  BUN/creatinine ratio at 54  And takes metformin thousand Mg  Hemoglobin A1c was increased from 6.7 to 7  Diabetic Foot exam was performed and was normal    PLAN:  Patient advised to follow strict diet and exercise routine  Patient started on Jardiance  Patient's lisinopril increased to 40 Mg           Relevant Medications    Empagliflozin (Jardiance) 10 MG TABS tablet    Other Relevant Orders    Albumin / creatinine urine ratio       Cardiovascular and Mediastinum    Essential hypertension    Relevant Medications    lisinopril (ZESTRIL) 40 mg tablet       Other    Current smoker     Currently smokes half a pack a day and has been trying to cut down  Previously smoked 1 pack a day for more than 20 years    PLAN:  CT of the chest for screening         Relevant Orders    CT lung screening program    Medicare annual wellness visit, subsequent     Other Visit Diagnoses       Encounter for immunization    -  Primary    Personal history of nicotine dependence        Relevant Orders    CT lung screening program            Depression Screening and Follow-up Plan: Patient was screened for depression during today's encounter. They screened negative with a PHQ-2 score of 0. Preventive health issues were discussed with patient, and age appropriate screening tests were ordered as noted in patient's After Visit Summary. Personalized health advice and appropriate referrals for health education or preventive services given if needed, as noted in patient's After Visit Summary. History of Present Illness:     Patient presents for a Medicare Wellness Visit    HPI 80years old male with past medical history of diabetes, hypertension, TIA, COPD presented for his wellness exam.  Patient has been compliant with his medications. However patient has not been disciplined with his diet in the past months. Patient HbA1c has been increased from 6.7 to 7. Patient urine also showed albumin and increased albumin creatinine ratio. Patient Care Team:  Leeanne Becker DO as PCP - [address] [place] [address] DO Gary as PCP - [facility] Attributed-Roster     Review of Systems:      Review of Systems   Constitutional: Negative. HENT: Negative. Eyes: Negative. Respiratory: Negative. Cardiovascular: Negative. Gastrointestinal: Negative. Endocrine: Negative. Genitourinary: Negative. Musculoskeletal:  Positive for back pain, neck pain and neck stiffness. Skin: Negative. Allergic/Immunologic: Negative. Neurological: Negative. Hematological: Negative. Psychiatric/Behavioral: Negative.            Problem List:     Patient Active Problem List   Diagnosis    Diabetes 1.5, managed as type 2 ([address] [facility])    Seasonal allergic rhinitis    Encounter for hepatitis C screening test for low risk patient    Dyslipidemia Essential hypertension    Diabetes mellitus type 2    Need for 23-polyvalent pneumococcal polysaccharide vaccine    Overweight (BMI 25.0-29. 9)    Tinea pedis of left foot    Essential hypertension    Hypertriglyceridemia    Tobacco abuse    Chronic obstructive pulmonary disease, unspecified COPD type ([address])    Leukocytosis    Diarrhea    Screening for HIV (human immunodeficiency virus)    Wellness examination    Chronic low back pain    Neck pain    Increased prostate specific antigen (PSA) velocity    TIA (transient ischemic attack)    Stroke, lacunar (HCC)    Postural dizziness    PFO (patent foramen ovale)    Current smoker    Medicare annual wellness visit, subsequent      Past Medical and Surgical History:     Past Medical History:   Diagnosis Date    Adenocarcinoma of prostate ([address])     [id number]  LAST ASSESSED    Diabetes mellitus ([address])     Hypertension      Past Surgical History:   Procedure Laterality Date    COLON SURGERY      HERNIA REPAIR      SHOULDER SURGERY      TONSILLECTOMY      TONSILLECTOMY AND ADENOIDECTOMY        Family History:     Family History   Problem Relation Age of Onset    Diabetes Father         MELLITUS      Social History:     Social History     Socioeconomic History    Marital status: /Civil Union     Spouse name: None    Number of children: None    Years of education: None    Highest education level: None   Occupational History    None   Tobacco Use    Smoking status: Every Day     Packs/day: 0.50     Types: Cigarettes    Smokeless tobacco: Never   Vaping Use    Vaping Use: Never used   Substance and Sexual Activity    Alcohol use: Not Currently    Drug use: No    Sexual activity: Yes   Other Topics Concern    None   Social History Narrative    COPY OF ADVANCE DIRECTIVE OBTAINED     Social Determinants of Health     Financial Resource Strain: Low Risk  (11/16/2023)    Overall Financial Resource Strain (CARDIA)     Difficulty of Paying Living Expenses: Not very hard   Food Insecurity: No Food Insecurity (5/1/2023)    Hunger Vital Sign     Worried About Running Out of Food in the Last Year: Never true     Ran Out of Food in the Last Year: Never true   Transportation Needs: No Transportation Needs (11/16/2023)    PRAPARE - Transportation     Lack of Transportation (Medical): No     Lack of Transportation (Non-Medical): No   Physical Activity: Not on file   Stress: Not on file   Social Connections: Not on file   Intimate Partner Violence: Not on file   Housing Stability: Low Risk  (5/1/2023)    Housing Stability Vital Sign     Unable to Pay for Housing in the Last Year: No     Number of Places Lived in the Last Year: 1     Unstable Housing in the Last Year: No      Medications and Allergies:     Current Outpatient Medications   Medication Sig Dispense Refill    albuterol (PROVENTIL HFA,VENTOLIN HFA) 90 mcg/act inhaler TAKE 2 PUFFS BY MOUTH EVERY 6 HOURS AS NEEDED FOR WHEEZE 1 g 1    amLODIPine (NORVASC) 10 mg tablet TAKE 1 TABLET BY MOUTH EVERY DAY 90 tablet 1    aspirin 81 mg chewable tablet Chew 1 tablet (81 mg total) daily Do not start before May 2, 2023. 30 tablet 0    atorvastatin (LIPITOR) 40 mg tablet Take 1 tablet (40 mg total) by mouth daily 90 tablet 3    Cholecalciferol (VITAMIN D3) 1000 units CAPS Take 1 capsule by mouth daily      cyanocobalamin (VITAMIN B-12) 1,000 mcg tablet Take by mouth daily      Empagliflozin (Jardiance) 10 MG TABS tablet Take 1 tablet (10 mg total) by mouth every morning 90 tablet 0    hydrochlorothiazide (MICROZIDE) 12.5 mg capsule TAKE 1 CAPSULE BY MOUTH EVERY DAY 90 capsule 1    lisinopril (ZESTRIL) 40 mg tablet Take 1 tablet (40 mg total) by mouth daily 90 tablet 0    Menthol-Methyl Salicylate (Icy Hot) 82-75 % STCK Apply 1 application.  topically daily as needed for pain      metFORMIN (GLUCOPHAGE) 1000 MG tablet Take 1 tablet (1,000 mg total) by mouth 2 (two) times a day with meals 180 tablet 3    Misc Natural Products (PROSTATE HEALTH PO) Take by mouth      Multiple Vitamin (MULTIVITAMIN) tablet Take 1 tablet by mouth daily      VITAMIN B COMPLEX-C CAPS Take 1 capsule by mouth daily      Coenzyme Q10 (Co Q10) 100 MG CAPS Take by mouth (Patient not taking: Reported on 11/16/2023)       No current facility-administered medications for this visit. Allergies   Allergen Reactions    Penicillins      Other reaction(s): Other (See Comments)  Unknown  Other reaction(s): Other (See Comments)  Unknown  Other reaction(s): Unknown  Category: Allergy;       Immunizations:     Immunization History   Administered Date(s) Administered    H1N1, All Formulations 01/21/2010    INFLUENZA 02/11/2015, 11/04/2018, 10/13/2019, 11/08/2020, 11/07/2021    Influenza, high dose seasonal 0.7 mL 10/19/2022    Influenza, seasonal, injectable 10/19/2011, 11/07/2013, 09/12/2014    Pneumococcal Polysaccharide PPV23 12/14/2018    Td (adult), adsorbed 11/25/2013    Tdap 09/07/2021      Health Maintenance:         Topic Date Due    Colorectal Cancer Screening  01/16/2015    Hepatitis C Screening  Completed         Topic Date Due    COVID-19 Vaccine (1) Never done    Pneumococcal Vaccine: 65+ Years (2 - PCV) 12/14/2019    Influenza Vaccine (1) 09/01/2023      Medicare Screening Tests and Risk Assessments:     Ryan Saleh is here for his Subsequent Wellness visit. Health Risk Assessment:   Patient rates overall health as very good. Patient feels that their physical health rating is same. Patient is satisfied with their life. Eyesight was rated as same. Hearing was rated as same. Patient feels that their emotional and mental health rating is same. Patients states they are never, rarely angry. Patient states they are sometimes unusually tired/fatigued. Pain experienced in the last 7 days has been some. Patient's pain rating has been 5/10. Patient states that he has experienced no weight loss or gain in last 6 months. Depression Screening:   PHQ-2 Score: 0      Fall Risk Screening:    In the past year, patient has experienced: history of falling in past year    Number of falls: 1  Injured during fall?: No    Feels unsteady when standing or walking?: Yes    Worried about falling?: No      Home Safety:  Patient does not have trouble with stairs inside or outside of their home. Patient has working smoke alarms and has working carbon monoxide detector. Home safety hazards include: loose rugs on the floor. Nutrition:   Current diet is Diabetic. Medications:   Patient is currently taking over-the-counter supplements. OTC medications include: see medication list. Patient is able to manage medications. Activities of Daily Living (ADLs)/Instrumental Activities of Daily Living (IADLs):   Walk and transfer into and out of bed and chair?: Yes  Dress and groom yourself?: Yes    Bathe or shower yourself?: Yes    Feed yourself? Yes  Do your laundry/housekeeping?: Yes  Manage your money, pay your bills and track your expenses?: Yes  Make your own meals?: Yes    Do your own shopping?: Yes    Previous Hospitalizations:   Any hospitalizations or ED visits within the last 12 months?: Yes    How many hospitalizations have you had in the last year?: 1-2    Advance Care Planning:   Living will: Yes    Durable POA for healthcare:  Yes    Advanced directive: Yes    Advanced directive counseling given: No    Five wishes given: No      PREVENTIVE SCREENINGS      Cardiovascular Screening:    General: Screening Not Indicated and History Lipid Disorder      Diabetes Screening:     General: Screening Not Indicated and History Diabetes      Colorectal Cancer Screening:     General: Screening Current      Prostate Cancer Screening:    General: History Prostate Cancer      Abdominal Aortic Aneurysm (AAA) Screening:    Risk factors include: age between 70-77 yo and tobacco use        Lung Cancer Screening:     General: Screening Not Indicated      Hepatitis C Screening:    General: Screening Current    Screening, Brief Intervention, and Referral to Treatment (SBIRT)    Screening  Typical number of drinks in a day: 0  Typical number of drinks in a week: 0  Interpretation: Low risk drinking behavior. Single Item Drug Screening:  How often have you used an illegal drug (including marijuana) or a prescription medication for non-medical reasons in the past year? never    Single Item Drug Screen Score: 0  Interpretation: Negative screen for possible drug use disorder    No results found. Physical Exam:     /84   Pulse 87   Wt 80.4 kg (177 lb 3.2 oz)   SpO2 96%   BMI 26.17 kg/m²     Physical Exam  Constitutional:       Appearance: Normal appearance. HENT:      Head: Normocephalic and atraumatic. Right Ear: External ear normal.      Left Ear: External ear normal.      Nose: Nose normal.      Mouth/Throat:      Mouth: Mucous membranes are moist.      Pharynx: Oropharynx is clear. Eyes:      Extraocular Movements: Extraocular movements intact. Conjunctiva/sclera: Conjunctivae normal.      Pupils: Pupils are equal, round, and reactive to light. Cardiovascular:      Rate and Rhythm: Normal rate and regular rhythm. Pulses: Normal pulses. Dorsalis pedis pulses are 2+ on the right side and 2+ on the left side. Posterior tibial pulses are 2+ on the right side and 2+ on the left side. Heart sounds: Normal heart sounds. Pulmonary:      Effort: Pulmonary effort is normal.      Breath sounds: Normal breath sounds. Abdominal:      General: Abdomen is flat. Bowel sounds are normal.      Palpations: Abdomen is soft. Musculoskeletal:         General: Normal range of motion. Cervical back: Normal range of motion. Feet:      Right foot:      Skin integrity: Callus present. No ulcer, skin breakdown, erythema, warmth or dry skin. Left foot:      Skin integrity: Callus present. No ulcer, skin breakdown, erythema, warmth or dry skin. Skin:     General: Skin is warm.       Capillary Refill: Capillary refill takes less than 2 seconds. Neurological:      General: No focal deficit present. Mental Status: He is alert and oriented to person, place, and time.           Felipe Marcial MD

## 2023-11-18 DIAGNOSIS — I10 ESSENTIAL HYPERTENSION: ICD-10-CM

## 2023-11-20 RX ORDER — AMLODIPINE BESYLATE 10 MG/1
TABLET ORAL
Qty: 90 TABLET | Refills: 1 | Status: SHIPPED | OUTPATIENT
Start: 2023-11-20

## 2023-12-11 DIAGNOSIS — E13.9 DIABETES 1.5, MANAGED AS TYPE 2 (HCC): ICD-10-CM

## 2023-12-18 ENCOUNTER — CLINICAL SUPPORT (OUTPATIENT)
Dept: INTERNAL MEDICINE CLINIC | Facility: CLINIC | Age: 68
End: 2023-12-18

## 2023-12-18 DIAGNOSIS — E11.9 TYPE 2 DIABETES MELLITUS WITHOUT COMPLICATION, WITHOUT LONG-TERM CURRENT USE OF INSULIN (HCC): Primary | ICD-10-CM

## 2023-12-18 NOTE — PROGRESS NOTES
[facility] Clinical Pharmacy Services    Patient presents for medication review and education.          ASSESSMENT/PLAN                                                                                                             1. Diabetes mellitus type 2  Assessment & Plan:    Lab Results   Component Value Date    HGBA1C 7.0 (H) 10/30/2023   Most recent A1c above goal but slighlty.   No home readings to review.   Would benefit from SGLT2i if able to make affordable due to ascvd and kidney benefit.      Medications: will have SW look into PACE/PACENET vs Synjardy PAP. If PAP is better route, will consider combination metformin/Jardiance to reduce pill burden    Orders:  -     Ambulatory referral to Social work care management program; Future; Expected date: 12/18/2023            Follow-Up: will follow-up with patient after SW determination - will consider POCT A1c prior to PCP appointment     SUBJECTIVE                                                                                                                        Medication list reviewed with patient, reports the following discrepancies/problems:  albuterol  amLODIPine  atorvastatin  Co Q10 Caps  Empagliflozin Tabs: did not  from pharmacy due to cost (> $100/90-day supply)  hydrochlorothiazide  Icy Hot Stck  lisinopril  metFORMIN  multivitamin  PROSTATE HEALTH PO  Vitamin B Complex-C Caps  vitamin B-12  Vitamin D3 Caps    Files taxes with wife - $50,000 annual     [facility] Eye Exam recently    OBJECTIVE                                                                                                                              BP Readings from Last 3 Encounters:   11/16/23 148/84   06/23/23 146/62   06/13/23 130/80       Pulse Readings from Last 3 Encounters:   11/16/23 87   06/23/23 85   06/13/23 70     Lab Results   Component Value Date    SODIUM 139 10/30/2023    K 4.2 10/30/2023    MICROALBCRE 54 (H) 10/30/2023    CREATININE 0.61 10/30/2023     EGFR 104 10/30/2023    CALCIUM 9.4 10/30/2023     Lab Results   Component Value Date    HGBA1C 7.0 (H) 10/30/2023    HGBA1C 6.7 (H) 05/15/2023    HGBA1C 6.6 (H) 05/01/2023     Lab Results   Component Value Date    CHOLESTEROL 113 10/30/2023    CHOLESTEROL 127 05/15/2023    CHOLESTEROL 112 05/01/2023     Lab Results   Component Value Date    HDL 35 (L) 10/30/2023    HDL 40 05/15/2023    HDL 32 (L) 05/01/2023     Lab Results   Component Value Date    TRIG 169 (H) 10/30/2023    TRIG 117 05/15/2023    TRIG 135 05/01/2023        Pharmacist Tracking Tool  Reason For Outreach: Embedded Pharmacist  Demographics:  Intervention Method: In Person  Type of Intervention: Follow-Up  Topics Addressed: Diabetes  Pharmacologic Interventions: N/A  Non-Pharmacologic Interventions: Cost  Time:  Direct Patient Care:  15  mins   Care Coordination:  10  mins  Recommendation Recipient: Patient/Caregiver  Outcome: Accepted

## 2023-12-18 NOTE — ASSESSMENT & PLAN NOTE
Lab Results   Component Value Date    HGBA1C 7.0 (H) 10/30/2023   Most recent A1c above goal but slighlty.   No home readings to review.   Would benefit from SGLT2i if able to make affordable due to ascvd and kidney benefit.      Medications: will have SW look into PACE/PACENET vs Synjardy PAP. If PAP is better route, will consider combination metformin/Jardiance to reduce pill burden

## 2023-12-19 ENCOUNTER — TELEPHONE (OUTPATIENT)
Dept: ADMINISTRATIVE | Facility: OTHER | Age: 68
End: 2023-12-19

## 2023-12-19 NOTE — TELEPHONE ENCOUNTER
----- Message from Chrystal Sarmiento Pharmacist sent at 12/18/2023  3:41 PM EST -----  12/18/23 3:41 PM    Hello, our patient Chepe A Xenophon has had Diabetic Eye Exam completed/performed. Please assist in updating the patient chart by pulling the document from the Media Tab. The date of service is 10/5/2023.     Thank you,  Chrystal Sarmiento, Pharmacist  [facility]

## 2023-12-19 NOTE — TELEPHONE ENCOUNTER
Upon review of the In Basket request we were able to locate, review, and update the patient chart as requested for Diabetic Eye Exam.    Any additional questions or concerns should be emailed to the Practice Liaisons via the appropriate education email address, please do not reply via In Basket.    Thank you  Abad Servin

## 2023-12-28 ENCOUNTER — PATIENT OUTREACH (OUTPATIENT)
Dept: INTERNAL MEDICINE CLINIC | Facility: CLINIC | Age: 68
End: 2023-12-28

## 2023-12-28 NOTE — PROGRESS NOTES
Referral received from Clinical Pharmacist Chrystal Sarmiento for Outpatient Social Work Care Manager (OP SWCM) to outreach patient and assist with medication costs regarding synjardy/jardiance ([employer] Cares PAP vs PACE/PACENET).    Per chart review, patient has Medicare A&B and [employer].    Call placed to patient to further discuss.  No answer, voicemail left, and awaiting return call.

## 2024-01-04 ENCOUNTER — PATIENT OUTREACH (OUTPATIENT)
Dept: INTERNAL MEDICINE CLINIC | Facility: CLINIC | Age: 69
End: 2024-01-04

## 2024-01-04 NOTE — PROGRESS NOTES
Return call received from patient.  Patient confirmed jardiance/synjardy is expensive.  He is open to changing to alternate medication and planned to discuss with PCP at next appt.  Next appt is not until 5/28.    Patient lives with his wife.  Their total monthly income is roughly $5,000 between SS and Pension.  Patient over income for BI Cares PAP and PACE/PACENET.  Encouraged patient to call [employer] C[employer] to ask about eligibility for discount/savings card; provided phone number.  Also provided phone number for [facility] to call and inquire about medication assistance.  No other known assistance programs that patient would qualify for due to income limits.    No other needs per patient.  Will route to Clinical Pharmacist Chrystal Sarmiento and PCP for further follow-up.

## 2024-01-04 NOTE — LETTER
[id number] [phone]    Re: Unable to Reach   1/4/2024       Dear Chepe,    I am a [facility]  and wanted to be certain you had information to contact me should you desire assistance with or have questions about non-medical aspects of your care such as [but not limited to] medical insurance, housing, transportation, material needs, or emergency needs, as I was unable to reach you.  If I do not have an answer I will assist you in finding the appropriate agency or individual who can help.      Please feel free to contact me at [phone]. Thank You.    Sincerely,         Argelia Ruiz

## 2024-01-04 NOTE — PROGRESS NOTES
2nd outreach attempt to patient to assess need for medication cost assistance programs for synjardy/jardiance ([employer] Cares PAP vs PACE/PACENET).  No answer, voicemail left, and awaiting return call.    Call placed to patient's wife Halima (on communication consent).  No answer, voicemail left, and awaiting return call.    Will mail Unable to Reach letter and close case if no return call received.

## 2024-01-15 PROBLEM — Z00.00 MEDICARE ANNUAL WELLNESS VISIT, SUBSEQUENT: Status: RESOLVED | Noted: 2023-11-16 | Resolved: 2024-01-15

## 2024-02-05 ENCOUNTER — CLINICAL SUPPORT (OUTPATIENT)
Dept: INTERNAL MEDICINE CLINIC | Facility: CLINIC | Age: 69
End: 2024-02-05
Payer: MEDICARE

## 2024-02-05 VITALS — WEIGHT: 174 LBS | HEIGHT: 69 IN | BODY MASS INDEX: 25.77 KG/M2

## 2024-02-05 DIAGNOSIS — E11.9 TYPE 2 DIABETES MELLITUS WITHOUT COMPLICATION, WITHOUT LONG-TERM CURRENT USE OF INSULIN (HCC): Primary | ICD-10-CM

## 2024-02-05 LAB — SL AMB POCT HEMOGLOBIN AIC: 7.2 (ref ?–6.5)

## 2024-02-05 PROCEDURE — 83036 HEMOGLOBIN GLYCOSYLATED A1C: CPT | Performed by: PHARMACIST

## 2024-02-05 NOTE — PROGRESS NOTES
[facility]  Harper Ernandez, Pharmacist    Chepe Lechuga is a 68 y.o. male who was referred to the pharmacist for diabetes education and management, referred by Miguel Arevalo DO .        Assessment/ Plan       Type 2 Diabetes:  Goal A1c <7 based on ADA/AACE/ACE guidelines.   Most recent A1c above goal 7.2 TODAY, increased from 7 previous.   Reported Home SMBG  Not taken regularly  Complications:  Microvascular complications: none noted  Macrovascular complications: stroke  Current Diabetes Regimen:  Metformin 1000mg BID  105 MAX - supplement with bitter melon, alphalipoicacid, pumpking pentose, chromium    Changes to Medication Regimen:   If PCP is in agreement with plan  Start generic Farxiga - dapagliflozin 5mg daily - should be Tier 2 $5 copay - patient will verify    Most recent labs and diabetes goals discussed   Materials Provided: none  Labs Ordered: none      2. Need for Additional Therapies:  Statin: on atorvastatin 40mg daily  ACEI/ARB: on lisinopril 40mg daily  ASA: on aspirin 81mg daily    3. Health Maintenance:  Vaccine recommendations: consider COVID booster, zoster, and due for pneumococcal at next PCP  Eye Exam: utd  Foot Exam: overdue  Urine Microalbumin: utd    4. Medication Reconciliation:   Medication list reviewed with pt at today's visit. Discrepancies as discussed below.  Medication list updated to reflect medications pt is currently taking  Renewal request sent to prescribing provider for: none       5. Hyperlipidemia with clinical ASCVD event:   2018 ACC/AHA lipid guidelines recommend high intensity statin.   Patient currently on high intensity and tolerating well without side effects.   Lipid panel/LFTs acceptable.  Medications: Continue  as prescribed.    6. HTN:   BP goal less than 130/80 mmHg.   In office BP above goal, HR acceptable.   Caffeine may be contributing to elevated BP readings today.   Home BP readings avg below goal. HR  acceptable  Patient with room for lifestyle modifications.   Serum K+ WNL.     Monitoring: side effects only    Referrals placed at this visit: none    Follow-up: pending farxiga start        Subjective   Patient could not afford jardiance nor synjardy and did not make cutoffs for PAP or PACE. Came today for repeat A1C to assess need for additional medication beyond metformin. Farxiga now is generic so should be reasonable cost.     Diabetes  He presents for his initial diabetic visit. He has type 2 diabetes mellitus. His disease course has been worsening. There are no hypoglycemic associated symptoms. There are no diabetic associated symptoms. There are no hypoglycemic complications. Symptoms are stable. Diabetic complications include a CVA. Risk factors for coronary artery disease include diabetes mellitus, dyslipidemia, male sex and hypertension. Current diabetic treatment includes oral agent (monotherapy). He is compliant with treatment all of the time. He is following a generally healthy diet. When asked about meal planning, he reported none. He has not had a previous visit with a dietitian. He rarely participates in exercise. An ACE inhibitor/angiotensin II receptor blocker is being taken. He does not see a podiatrist.Eye exam is current.       Medication Adherence/ Tolerability:  Tolerates metformin  Jardiance/synjardy too expensive  The patient reports missing 0 doses of medication in the past 2 weeks.       Medications Tried in Past:  - jardiance  - synjardy    2. Lifestyle:   Last visit with dietician: n/a  Previously attended Living Well with Diabetes Class: no  Diet Recall:   Breakfast:   Lunch:   Dinner:   Snacks:   Beverages:   Physical Activity:     3. Home monitoring devices  Glucometer: Yes, Brand: unknown  CGM: No, Brand: n/a  BP monitor: Yes, Brand: unknown        Objective       Current Outpatient Medications   Medication Instructions   • albuterol (PROVENTIL HFA,VENTOLIN HFA) 90 mcg/act inhaler  TAKE 2 PUFFS BY MOUTH EVERY 6 HOURS AS NEEDED FOR WHEEZE   • amLODIPine (NORVASC) 10 mg tablet TAKE 1 TABLET BY MOUTH EVERY DAY   • aspirin 81 mg, Oral, Daily   • atorvastatin (LIPITOR) 40 mg, Oral, Daily   • cetirizine (ZYRTEC) 10 mg, Oral, Daily   • Cholecalciferol (VITAMIN D3) 1000 units CAPS 1 capsule, Oral, Daily   • Coenzyme Q10 (Co Q10) 100 MG CAPS Take by mouth   • cyanocobalamin (VITAMIN B-12) 1,000 mcg tablet Oral, Daily   • dapagliflozin 5 mg, Oral, Daily   • hydrochlorothiazide (MICROZIDE) 12.5 mg capsule TAKE 1 CAPSULE BY MOUTH EVERY DAY   • lisinopril (ZESTRIL) 40 mg, Oral, Daily   • Menthol-Methyl Salicylate (Icy Hot) 10-30 % STCK 1 application., Apply externally, Daily PRN   • metFORMIN (GLUCOPHAGE) 1,000 mg, Oral, 2 times daily with meals   • Misc Natural Products (PROSTATE HEALTH PO) Oral   • Multiple Vitamin (MULTIVITAMIN) tablet 1 tablet, Oral, Daily   • VITAMIN B COMPLEX-C CAPS 1 capsule, Oral, Daily     Allergies   Allergen Reactions   • Penicillins      Other reaction(s): Other (See Comments)  Unknown  Other reaction(s): Other (See Comments)  Unknown  Other reaction(s): Unknown  Category: Allergy;        Immunization History   Administered Date(s) Administered   • H1N1, All Formulations 01/21/2010   • INFLUENZA 02/11/2015, 11/04/2018, 10/13/2019, 11/08/2020, 11/07/2021   • Influenza, high dose seasonal 0.7 mL 10/19/2022   • Influenza, seasonal, injectable 10/19/2011, 11/07/2013, 09/12/2014   • Pneumococcal Polysaccharide PPV23 12/14/2018   • Td (adult), adsorbed 11/25/2013   • Tdap 09/07/2021       I have reviewed the patient's allergies, medications and history as noted in the electronic medical record and updated as necessary.    Lab Results   Component Value Date    HGBA1C 7.2 (A) 02/05/2024    HGBA1C 7.0 (H) 10/30/2023    HGBA1C 6.7 (H) 05/15/2023       Blood Glucose Readings  The patient is currently checking blood glucose 0 times per day. Patient does not report with SMBG logs.    Date  "AM Post-Breakfast Lunch Post-Lunch Dinner Post-Dinner Comments                                                                                                                           Avg            Hypoglycemia: The patient had 0 episodes/symptoms of hypoglycemia.   Hyperglycemia: The patient had 0 symptoms of hyperglycemia.     ASCVD Risk:  The ASCVD Risk score (Latoya GROSSMAN, et al., 2019) failed to calculate for the following reasons:    The valid total cholesterol range is 130 to 320 mg/dL     Vitals:  Vitals:    02/05/24 1036   Weight: 78.9 kg (174 lb)   Height: 5' 9.02\" (1.753 m)       Labs:    Lab Results   Component Value Date    SODIUM 139 10/30/2023    K 4.2 10/30/2023    EGFR 104 10/30/2023    CREATININE 0.61 10/30/2023    GLUF 116 (H) 10/30/2023    MICROALBCRE 54 (H) 10/30/2023         Pharmacist Tracking Tool     Pharmacist Tracking Tool  Reason For Outreach: Embedded Pharmacist  Demographics:  Intervention Method: In Person  Type of Intervention: New  Topics Addressed: CVD, Diabetes, Dyslipidemia, and Hypertension  Pharmacologic Interventions: Medication Initiation, Prevent or Manage ANGEL LUIS, and Med Rec  Non-Pharmacologic Interventions: Adherence addressed, Care coordination, Chart update, Cost, Disease state education, Immunization, Labs, Care Gap, and Medication/Device education  Time:  Direct Patient Care:  45  mins  Care Coordination:  30  mins  Recommendation Recipient: Patient/Caregiver  Outcome: Accepted      "

## 2024-02-14 DIAGNOSIS — I10 ESSENTIAL HYPERTENSION: ICD-10-CM

## 2024-02-14 RX ORDER — HYDROCHLOROTHIAZIDE 12.5 MG/1
CAPSULE, GELATIN COATED ORAL
Qty: 90 CAPSULE | Refills: 1 | Status: SHIPPED | OUTPATIENT
Start: 2024-02-14

## 2024-02-15 DIAGNOSIS — I10 ESSENTIAL HYPERTENSION: ICD-10-CM

## 2024-02-15 RX ORDER — LISINOPRIL 40 MG/1
40 TABLET ORAL DAILY
Qty: 90 TABLET | Refills: 1 | Status: SHIPPED | OUTPATIENT
Start: 2024-02-15

## 2024-05-15 DIAGNOSIS — I10 ESSENTIAL HYPERTENSION: ICD-10-CM

## 2024-05-15 DIAGNOSIS — E78.5 DYSLIPIDEMIA: ICD-10-CM

## 2024-05-15 RX ORDER — AMLODIPINE BESYLATE 10 MG/1
TABLET ORAL
Qty: 90 TABLET | Refills: 1 | Status: SHIPPED | OUTPATIENT
Start: 2024-05-15

## 2024-05-15 RX ORDER — ATORVASTATIN CALCIUM 40 MG/1
40 TABLET, FILM COATED ORAL DAILY
Qty: 90 TABLET | Refills: 1 | Status: SHIPPED | OUTPATIENT
Start: 2024-05-15

## 2024-05-20 ENCOUNTER — RA CDI HCC (OUTPATIENT)
Dept: OTHER | Facility: HOSPITAL | Age: 69
End: 2024-05-20

## 2024-06-10 ENCOUNTER — TELEPHONE (OUTPATIENT)
Age: 69
End: 2024-06-10

## 2024-06-10 NOTE — TELEPHONE ENCOUNTER
Pt called in requesting if routine blood work can be order for him. Pt states he completes labs every 6 months and does a f/u appt with his PCP to review them.    Please advise and inform pt once labs are ordered and in chart, if possible.

## 2024-06-10 NOTE — TELEPHONE ENCOUNTER
Pt wants to know what labs need to be done before his next appt November 2024. Please have PCP contact pt and advise. Thank you for your assistance.

## 2024-06-16 DIAGNOSIS — E13.9 DIABETES 1.5, MANAGED AS TYPE 2 (HCC): ICD-10-CM

## 2024-06-17 DIAGNOSIS — E11.9 TYPE 2 DIABETES MELLITUS WITHOUT COMPLICATION, WITHOUT LONG-TERM CURRENT USE OF INSULIN (HCC): Primary | ICD-10-CM

## 2024-06-17 DIAGNOSIS — Z12.5 SCREENING PSA (PROSTATE SPECIFIC ANTIGEN): ICD-10-CM

## 2024-08-05 ENCOUNTER — OFFICE VISIT (OUTPATIENT)
Dept: INTERNAL MEDICINE CLINIC | Facility: CLINIC | Age: 69
End: 2024-08-05
Payer: MEDICARE

## 2024-08-05 VITALS
HEART RATE: 90 BPM | OXYGEN SATURATION: 97 % | SYSTOLIC BLOOD PRESSURE: 142 MMHG | WEIGHT: 173 LBS | BODY MASS INDEX: 25.54 KG/M2 | DIASTOLIC BLOOD PRESSURE: 78 MMHG

## 2024-08-05 DIAGNOSIS — E78.5 DYSLIPIDEMIA: ICD-10-CM

## 2024-08-05 DIAGNOSIS — E11.9 TYPE 2 DIABETES MELLITUS WITHOUT COMPLICATION, WITHOUT LONG-TERM CURRENT USE OF INSULIN (HCC): Primary | ICD-10-CM

## 2024-08-05 DIAGNOSIS — I10 PRIMARY HYPERTENSION: ICD-10-CM

## 2024-08-05 DIAGNOSIS — Z23 ENCOUNTER FOR IMMUNIZATION: ICD-10-CM

## 2024-08-05 DIAGNOSIS — J44.9 CHRONIC OBSTRUCTIVE PULMONARY DISEASE, UNSPECIFIED COPD TYPE (HCC): ICD-10-CM

## 2024-08-05 LAB — SL AMB POCT HEMOGLOBIN AIC: 7 (ref ?–6.5)

## 2024-08-05 PROCEDURE — 83036 HEMOGLOBIN GLYCOSYLATED A1C: CPT | Performed by: INTERNAL MEDICINE

## 2024-08-05 PROCEDURE — 99214 OFFICE O/P EST MOD 30 MIN: CPT | Performed by: INTERNAL MEDICINE

## 2024-08-05 NOTE — PATIENT INSTRUCTIONS
Problem List Items Addressed This Visit          Cardiovascular and Mediastinum    Essential hypertension     Borderline, continue meds            Respiratory    Chronic obstructive pulmonary disease, unspecified COPD type (HCC)     Smoking cessation recommended            Endocrine    Diabetes mellitus type 2 - Primary    Relevant Orders    POCT hemoglobin A1c       Other    Dyslipidemia     Continue atorvastatin          Other Visit Diagnoses       Encounter for immunization

## 2024-08-05 NOTE — PROGRESS NOTES
Ambulatory Visit  Name: Chepe Lechuga      : 1955      MRN: [id number]  Encounter Provider: Abad Nayak MD  Encounter Date: 2024   [facility]    Assessment & Plan   1. Diabetes mellitus type 2  -     POCT hemoglobin A1c  2. Encounter for immunization  3. Chronic obstructive pulmonary disease, unspecified COPD type (HCC)  Assessment & Plan:  Smoking cessation recommended  4. Primary hypertension  Assessment & Plan:  Borderline, continue meds  5. Dyslipidemia  Assessment & Plan:  Continue atorvastatin         History of Present Illness     I am seeing patient today in coverage for an acute issue.  Pt went to a Podiatrist for evaluation for diabetic shoes.      Review of Systems   Constitutional:  Negative for chills and fever.   Respiratory:  Negative for shortness of breath.    Cardiovascular:  Negative for chest pain.     Past Medical History:   Diagnosis Date   • Adenocarcinoma of prostate (HCC)     2013  LAST ASSESSED   • Diabetes mellitus (HCC)    • Hypertension      Past Surgical History:   Procedure Laterality Date   • COLON SURGERY     • HERNIA REPAIR     • SHOULDER SURGERY     • TONSILLECTOMY     • TONSILLECTOMY AND ADENOIDECTOMY       Family History   Problem Relation Age of Onset   • Diabetes Father         MELLITUS     Social History     Tobacco Use   • Smoking status: Every Day     Current packs/day: 0.50     Types: Cigarettes     Passive exposure: Current   • Smokeless tobacco: Never   Vaping Use   • Vaping status: Never Used   Substance and Sexual Activity   • Alcohol use: Not Currently   • Drug use: No   • Sexual activity: Yes     Current Outpatient Medications on File Prior to Visit   Medication Sig   • albuterol (PROVENTIL HFA,VENTOLIN HFA) 90 mcg/act inhaler TAKE 2 PUFFS BY MOUTH EVERY 6 HOURS AS NEEDED FOR WHEEZE   • amLODIPine (NORVASC) 10 mg tablet TAKE 1 TABLET BY MOUTH EVERY DAY   • atorvastatin (LIPITOR) 40 mg tablet TAKE 1  TABLET BY MOUTH EVERY DAY   • cetirizine (ZyrTEC) 10 mg tablet Take 1 tablet (10 mg total) by mouth daily   • Cholecalciferol (VITAMIN D3) 1000 units CAPS Take 1 capsule by mouth daily   • Coenzyme Q10 (Co Q10) 100 MG CAPS Take by mouth   • cyanocobalamin (VITAMIN B-12) 1,000 mcg tablet Take by mouth daily   • dapagliflozin 5 MG TABS Take 1 tablet (5 mg total) by mouth daily   • hydroCHLOROthiazide 12.5 mg capsule TAKE 1 CAPSULE BY MOUTH EVERY DAY   • lisinopril (ZESTRIL) 40 mg tablet TAKE 1 TABLET BY MOUTH EVERY DAY   • Menthol-Methyl Salicylate (Icy Hot) 10-30 % STCK Apply 1 application. topically daily as needed for pain   • metFORMIN (GLUCOPHAGE) 1000 MG tablet TAKE 1 TABLET BY MOUTH TWICE A DAY WITH FOOD   • Misc Natural Products (PROSTATE HEALTH PO) Take by mouth   • Multiple Vitamin (MULTIVITAMIN) tablet Take 1 tablet by mouth daily   • VITAMIN B COMPLEX-C CAPS Take 1 capsule by mouth daily   • aspirin 81 mg chewable tablet Chew 1 tablet (81 mg total) daily Do not start before May 2, 2023.     Allergies   Allergen Reactions   • Penicillins      Other reaction(s): Other (See Comments)  Unknown  Other reaction(s): Other (See Comments)  Unknown  Other reaction(s): Unknown  Category: Allergy;      Immunization History   Administered Date(s) Administered   • H1N1, All Formulations 01/21/2010   • INFLUENZA 02/11/2015, 11/04/2018, 10/13/2019, 11/08/2020, 11/07/2021   • Influenza, high dose seasonal 0.7 mL 10/19/2022   • Influenza, seasonal, injectable 10/19/2011, 11/07/2013, 09/12/2014   • Pneumococcal Polysaccharide PPV23 12/14/2018   • Td (adult), adsorbed 11/25/2013   • Tdap 09/07/2021     Objective     /78 (BP Location: Left arm, Patient Position: Sitting, Cuff Size: Standard)   Pulse 90   Wt 78.5 kg (173 lb)   SpO2 97%   BMI 25.54 kg/m²   Patient's shoes and socks removed.    Right Foot/Ankle   Right Foot Inspection  Skin Exam: skin normal, skin intact, dry skin, callus and callus. No warmth, no  erythema, no maceration, no abnormal color, no pre-ulcer and no ulcer.     Toe Exam: ROM and strength within normal limits and right toe deformity (nail). No swelling, no tenderness and erythema    Sensory   Monofilament testing: intact    Vascular  The right DP pulse is 2+.     Left Foot/Ankle  Left Foot Inspection  Skin Exam: skin normal, skin intact, dry skin and callus. No warmth, no erythema, no maceration, normal color, no pre-ulcer and no ulcer.     Toe Exam: ROM and strength within normal limits and left toe deformity (nail). No swelling, no tenderness and no erythema.     Sensory   Monofilament testing: intact    Vascular  The left DP pulse is 2+.     Assign Risk Category  Deformity present  No loss of protective sensation  No weak pulses  Risk: 0     Physical Exam  Constitutional:       General: He is not in acute distress.     Appearance: Normal appearance.   Cardiovascular:      Rate and Rhythm: Normal rate and regular rhythm.      Pulses: no weak pulses.           Dorsalis pedis pulses are 2+ on the right side and 2+ on the left side.      Heart sounds: Normal heart sounds. No murmur heard.  Pulmonary:      Effort: Pulmonary effort is normal. No respiratory distress.      Breath sounds: Normal breath sounds. No stridor. No wheezing, rhonchi or rales.   Feet:      Right foot:      Skin integrity: Callus and dry skin present. No ulcer, skin breakdown, erythema or warmth.      Left foot:      Skin integrity: Callus and dry skin present. No ulcer, skin breakdown, erythema or warmth.   Neurological:      Mental Status: He is alert.

## 2024-08-14 DIAGNOSIS — I10 ESSENTIAL HYPERTENSION: ICD-10-CM

## 2024-08-14 RX ORDER — HYDROCHLOROTHIAZIDE 12.5 MG/1
CAPSULE ORAL
Qty: 90 CAPSULE | Refills: 1 | Status: SHIPPED | OUTPATIENT
Start: 2024-08-14

## 2024-08-14 RX ORDER — LISINOPRIL 40 MG/1
40 TABLET ORAL DAILY
Qty: 90 TABLET | Refills: 1 | Status: SHIPPED | OUTPATIENT
Start: 2024-08-14

## 2024-08-17 DIAGNOSIS — I10 ESSENTIAL HYPERTENSION: ICD-10-CM

## 2024-08-17 DIAGNOSIS — E78.5 DYSLIPIDEMIA: ICD-10-CM

## 2024-08-17 RX ORDER — AMLODIPINE BESYLATE 10 MG/1
TABLET ORAL
Qty: 90 TABLET | Refills: 1 | Status: SHIPPED | OUTPATIENT
Start: 2024-08-17

## 2024-08-17 RX ORDER — ATORVASTATIN CALCIUM 40 MG/1
40 TABLET, FILM COATED ORAL DAILY
Qty: 90 TABLET | Refills: 1 | Status: SHIPPED | OUTPATIENT
Start: 2024-08-17

## 2024-11-21 ENCOUNTER — RESULTS FOLLOW-UP (OUTPATIENT)
Dept: INTERNAL MEDICINE CLINIC | Facility: CLINIC | Age: 69
End: 2024-11-21

## 2024-11-21 ENCOUNTER — APPOINTMENT (OUTPATIENT)
Dept: LAB | Age: 69
End: 2024-11-21
Payer: MEDICARE

## 2024-11-21 DIAGNOSIS — E11.9 TYPE 2 DIABETES MELLITUS WITHOUT COMPLICATION, WITHOUT LONG-TERM CURRENT USE OF INSULIN (HCC): ICD-10-CM

## 2024-11-21 DIAGNOSIS — Z12.5 SCREENING PSA (PROSTATE SPECIFIC ANTIGEN): ICD-10-CM

## 2024-11-21 LAB
ALBUMIN SERPL BCG-MCNC: 4.4 G/DL (ref 3.5–5)
ALP SERPL-CCNC: 121 U/L (ref 34–104)
ALT SERPL W P-5'-P-CCNC: 20 U/L (ref 7–52)
ANION GAP SERPL CALCULATED.3IONS-SCNC: 9 MMOL/L (ref 4–13)
AST SERPL W P-5'-P-CCNC: 17 U/L (ref 13–39)
BILIRUB SERPL-MCNC: 0.86 MG/DL (ref 0.2–1)
BUN SERPL-MCNC: 19 MG/DL (ref 5–25)
CALCIUM SERPL-MCNC: 9.4 MG/DL (ref 8.4–10.2)
CHLORIDE SERPL-SCNC: 104 MMOL/L (ref 96–108)
CHOLEST SERPL-MCNC: 109 MG/DL (ref ?–200)
CO2 SERPL-SCNC: 24 MMOL/L (ref 21–32)
CREAT SERPL-MCNC: 0.75 MG/DL (ref 0.6–1.3)
CREAT UR-MCNC: 56.2 MG/DL
EST. AVERAGE GLUCOSE BLD GHB EST-MCNC: 148 MG/DL
GFR SERPL CREATININE-BSD FRML MDRD: 94 ML/MIN/1.73SQ M
GLUCOSE P FAST SERPL-MCNC: 115 MG/DL (ref 65–99)
HBA1C MFR BLD: 6.8 %
HDLC SERPL-MCNC: 35 MG/DL
LDLC SERPL CALC-MCNC: 43 MG/DL (ref 0–100)
MICROALBUMIN UR-MCNC: <7 MG/L
POTASSIUM SERPL-SCNC: 4.1 MMOL/L (ref 3.5–5.3)
PROT SERPL-MCNC: 7.3 G/DL (ref 6.4–8.4)
PSA SERPL-MCNC: 5.04 NG/ML (ref 0–4)
SODIUM SERPL-SCNC: 137 MMOL/L (ref 135–147)
TRIGL SERPL-MCNC: 155 MG/DL (ref ?–150)

## 2024-11-21 PROCEDURE — 80061 LIPID PANEL: CPT

## 2024-11-21 PROCEDURE — G0103 PSA SCREENING: HCPCS

## 2024-11-21 PROCEDURE — 83036 HEMOGLOBIN GLYCOSYLATED A1C: CPT

## 2024-11-21 PROCEDURE — 36415 COLL VENOUS BLD VENIPUNCTURE: CPT

## 2024-11-21 PROCEDURE — 80053 COMPREHEN METABOLIC PANEL: CPT

## 2024-12-04 RX ORDER — CLINDAMYCIN HYDROCHLORIDE 300 MG/1
1 CAPSULE ORAL 4 TIMES DAILY
COMMUNITY
Start: 2024-11-25

## 2024-12-06 ENCOUNTER — OFFICE VISIT (OUTPATIENT)
Dept: INTERNAL MEDICINE CLINIC | Facility: CLINIC | Age: 69
End: 2024-12-06
Payer: MEDICARE

## 2024-12-06 VITALS
OXYGEN SATURATION: 97 % | DIASTOLIC BLOOD PRESSURE: 62 MMHG | BODY MASS INDEX: 25.71 KG/M2 | WEIGHT: 174.2 LBS | SYSTOLIC BLOOD PRESSURE: 138 MMHG | HEART RATE: 84 BPM

## 2024-12-06 DIAGNOSIS — M76.61 ACHILLES TENDINITIS OF BOTH LOWER EXTREMITIES: ICD-10-CM

## 2024-12-06 DIAGNOSIS — I63.81 STROKE, LACUNAR (HCC): ICD-10-CM

## 2024-12-06 DIAGNOSIS — Z00.00 MEDICARE ANNUAL WELLNESS VISIT, SUBSEQUENT: ICD-10-CM

## 2024-12-06 DIAGNOSIS — I10 PRIMARY HYPERTENSION: ICD-10-CM

## 2024-12-06 DIAGNOSIS — R97.20 ELEVATED PSA: ICD-10-CM

## 2024-12-06 DIAGNOSIS — E13.9 DIABETES 1.5, MANAGED AS TYPE 2 (HCC): ICD-10-CM

## 2024-12-06 DIAGNOSIS — Z72.0 TOBACCO ABUSE: ICD-10-CM

## 2024-12-06 DIAGNOSIS — M76.62 ACHILLES TENDINITIS OF BOTH LOWER EXTREMITIES: ICD-10-CM

## 2024-12-06 DIAGNOSIS — Z23 ENCOUNTER FOR IMMUNIZATION: Primary | ICD-10-CM

## 2024-12-06 DIAGNOSIS — E11.9 TYPE 2 DIABETES MELLITUS WITHOUT COMPLICATION, WITHOUT LONG-TERM CURRENT USE OF INSULIN (HCC): ICD-10-CM

## 2024-12-06 DIAGNOSIS — Z12.11 SCREENING FOR COLON CANCER: ICD-10-CM

## 2024-12-06 PROCEDURE — 99214 OFFICE O/P EST MOD 30 MIN: CPT | Performed by: INTERNAL MEDICINE

## 2024-12-06 PROCEDURE — G0439 PPPS, SUBSEQ VISIT: HCPCS | Performed by: INTERNAL MEDICINE

## 2024-12-06 PROCEDURE — 90662 IIV NO PRSV INCREASED AG IM: CPT

## 2024-12-06 PROCEDURE — 90471 IMMUNIZATION ADMIN: CPT

## 2024-12-06 RX ORDER — BERBERINE CHLOR/SEAWEED/CHROM 500-250 MG
500 CAPSULE ORAL
COMMUNITY

## 2024-12-06 NOTE — PROGRESS NOTES
Name: Chepe Lechuga      : 1955      MRN: [id number]  Encounter Provider: Miguel Arevalo DO  Encounter Date: 2024   Enc[facility] department: [facility]    Assessment & Plan  Encounter for immunization    Orders:    influenza vaccine, high-dose, PF 0.5 mL (Fluzone High Dose)    Diabetes 1.5, managed as type 2 (HCC)    Lab Results   Component Value Date    HGBA1C 6.8 (H) 2024     I have counselled the pt to follow a healthy and balanced diet ,and recommend routine exercise.  Patient reports me is reduced the dose of metformin to 1000 mg once daily he is now taking berberine his A1c is stable he is tolerating these medications well he would like to try this over the next 6 months and continue to monitor I do encourage the patient to get an eye examination yearly I will be ordering diabetic laboratories including comprehensive metabolic panel, hemoglobin A1c, urine microalbumin, lipid panel.  Orders:    metFORMIN (GLUCOPHAGE) 1000 MG tablet; Take 1 tablet (1,000 mg total) by mouth daily with breakfast    Comprehensive metabolic panel; Future    Albumin / creatinine urine ratio; Future    Lipid Panel with Direct LDL reflex; Future    Hemoglobin A1c (w/out EAG) ([facility] ONLY); Future    Screening for colon cancer    Orders:    Ambulatory Referral to Colorectal Surgery; Future    Elevated PSA  Reviewed laboratories in detail explained the importance of evaluation for elevated PSA  Orders:    Ambulatory Referral to Urology; Future    Medicare annual wellness visit, subsequent  Assessment and plan 1.  Medicare subsequent annual wellness examination overall the patient is clinically stable and doing well, we encouraged the patient to follow a healthy and balanced diet.  We recommend that the patient exercise routinely approximately 30 minutes 5 times per week .  We have reviewed the patient's vaccines and have made recommendations for updates if necessary   patient would like to  hold off on pneumonia shot   .  We will be ordering screening laboratories which are age appropriate.  Return to the office in   6 months   call if any problems.       Tobacco abuse  I have counseled the patient to quit smoking, I have recommended that the patient set up a quit date and I have asked the patient to cut down the cigarettes until the quit date.       Stroke, lacunar (HCC)  Clinically stable and doing well continue the current medical regiment will continue monitor.  Continue antihypertensive control patient is stable and doing well we will continue to observe continue Lipitor 40 mg once daily       Primary hypertension  Hypertension - controlled, I have counseled patient following healthy balance diet, I would like the patient reduce sodium, exercise routinely, I would like the patient continued the med current medical regiment and we will continue to monitor.       Diabetes mellitus type 2    Lab Results   Component Value Date    HGBA1C 6.8 (H) 11/21/2024     Return to office 6  months  call if any problems       Achilles tendinitis of both lower extremities  Patient's symptoms are consistent with a mild Achilles tendinitis Achilles tendon is intact related to putting his ankles over the bedpost I have counseled patient to reposition his body for sleep, I have given him some stretching exercises he can use Tylenol as directed symptoms are improving next 2 to 4 weeks he will contact me for formal physical therapy         Depression Screening and Follow-up Plan: Patient was screened for depression during today's encounter. They screened negative with a PHQ-2 score of 0.    Tobacco Cessation Counseling: Tobacco cessation counseling was provided. The patient is sincerely urged to quit consumption of tobacco. He is not ready to quit tobacco.       Preventive health issues were discussed with patient, and age appropriate screening tests were ordered as noted in patient's After Visit Summary. Personalized  health advice and appropriate referrals for health education or preventive services given if needed, as noted in patient's After Visit Summary.  Risk factors -- The following are risk factors for RVO [3,7-13]:  ?Older age  ?Hypertension  ?Diabetes (associated with ?RVO but not ?RV?)  ?Cardiovascular disease  ?Smoking  ?Obesity  ?Hypercoagulable state, particularly factor V Leiden and activated protein C resistance  ?Hyperlipidemia  ?Gl????ma (particularly for CRV?)  ?Retinal arteriolar abnormalities  History of Present Illness     HPI 69-year old male coming in for a follow up visit regarding tobacco abuse, lacunar stroke, hypertension, type 2 diabetes and Achilles tendinitis the patient reports me compliant taking medications without untoward side effects the.  The patient is here to review his medical condition, update me on the medical condition and the patient reports me no hospitalizations and no ER visits.  No injuries no illnesses noted follow-up and balanced diet reviewed laboratories in detail also he is updated on his medical condition over the last 6 months there is been mildly pain in the morning the patient does report to me has been noticing some pain in the bilateral Achilles when he awakens in the morning he does report to me that he does slide down in the bed and his Achilles will rub up against the bed head of the bed and hangs over possibly leading to the symptoms he initially wakes up stands up and feels pain in that area takes a little bit of time to improve.  He is planning to make some adjustments to see if this will alleviate his symptoms.  Patient Care Team:  Miguel Arevalo DO as PCP - General  Miguel Arevalo DO as PCP - PCP-[facility] Attributed-Roster    Review of Systems   Constitutional:  Negative for activity change, appetite change and unexpected weight change.   HENT:  Negative for congestion and postnasal drip.    Eyes:  Negative for visual disturbance.    Respiratory:  Negative for cough and shortness of breath.    Cardiovascular:  Negative for chest pain.   Gastrointestinal:  Negative for abdominal pain, diarrhea, nausea and vomiting.   Neurological:  Negative for dizziness, light-headedness and headaches.   Hematological:  Negative for adenopathy.     Medical History Reviewed by provider this encounter:       Annual Wellness Visit Questionnaire   Chepe is here for his Subsequent Wellness visit.     Health Risk Assessment:   Patient rates overall health as excellent. Patient feels that their physical health rating is same. Patient is very satisfied with their life. Eyesight was rated as same. Hearing was rated as same. Patient feels that their emotional and mental health rating is same. Patients states they are never, rarely angry. Patient states they are often unusually tired/fatigued. Pain experienced in the last 7 days has been some. Patient's pain rating has been 7/10. Patient states that he has experienced no weight loss or gain in last 6 months.     Depression Screening:   PHQ-2 Score: 0      Fall Risk Screening:   In the past year, patient has experienced: no history of falling in past year      Home Safety:  Patient has trouble with stairs inside or outside of their home. Patient has working smoke alarms and has working carbon monoxide detector. Home safety hazards include: none.     Nutrition:   Current diet is Regular.     Medications:   Patient is currently taking over-the-counter supplements. OTC medications include: see medication list. Patient is able to manage medications.     Activities of Daily Living (ADLs)/Instrumental Activities of Daily Living (IADLs):   Walk and transfer into and out of bed and chair?: Yes  Dress and groom yourself?: Yes    Bathe or shower yourself?: Yes    Feed yourself? Yes  Do your laundry/housekeeping?: Yes  Manage your money, pay your bills and track your expenses?: Yes  Make your own meals?: Yes    Do your own  shopping?: Yes    Previous Hospitalizations:   Any hospitalizations or ED visits within the last 12 months?: No      Advance Care Planning:   Living will: No    Durable POA for healthcare: No    Advanced directive: No      PREVENTIVE SCREENINGS      Cardiovascular Screening:    General: Screening Not Indicated and History Lipid Disorder      Diabetes Screening:     General: Screening Not Indicated and History Diabetes      Prostate Cancer Screening:    General: History Prostate Cancer      Abdominal Aortic Aneurysm (AAA) Screening:    Risk factors include: age between 65-76 yo and tobacco use        Lung Cancer Screening:     General: Screening Not Indicated      Hepatitis C Screening:    General: Screening Current    Screening, Brief Intervention, and Referral to Treatment (SBIRT)    Screening  Typical number of drinks in a day: 0  Typical number of drinks in a week: 0  Interpretation: Low risk drinking behavior.    Review of Current Opioid Use    Opioid Risk Tool (ORT) Interpretation: Complete Opioid Risk Tool (ORT)    Social Drivers of Health     Financial Resource Strain: Low Risk  (11/16/2023)    Overall Financial Resource Strain (CARDIA)     Difficulty of Paying Living Expenses: Not very hard   Food Insecurity: No Food Insecurity (12/6/2024)    Hunger Vital Sign     Worried About Running Out of Food in the Last Year: Never true     Ran Out of Food in the Last Year: Never true   Transportation Needs: No Transportation Needs (12/6/2024)    PRAPARE - Transportation     Lack of Transportation (Medical): No     Lack of Transportation (Non-Medical): No   Housing Stability: Unknown (12/6/2024)    Housing Stability Vital Sign     Unable to Pay for Housing in the Last Year: No     Homeless in the Last Year: No   Utilities: Not At Risk (12/6/2024)    [facility] Utilities     Threatened with loss of utilities: No     No results found.    Objective   /62 (BP Location: Left arm, Patient Position: Sitting, Cuff Size: Large)    Pulse 84   Wt 79 kg (174 lb 3.2 oz)   SpO2 97%   BMI 25.71 kg/m²     Physical Exam  Vitals and nursing note reviewed.   Constitutional:       General: He is not in acute distress.     Appearance: Normal appearance. He is well-developed. He is not ill-appearing, toxic-appearing or diaphoretic.   HENT:      Head: Normocephalic and atraumatic.      Right Ear: External ear normal.      Left Ear: External ear normal.      Nose: Nose normal.   Eyes:      General: No scleral icterus.        Right eye: No discharge.         Left eye: No discharge.      Conjunctiva/sclera: Conjunctivae normal.      Pupils: Pupils are equal, round, and reactive to light.   Cardiovascular:      Rate and Rhythm: Normal rate and regular rhythm.      Heart sounds: Normal heart sounds. No murmur heard.     No friction rub. No gallop.   Pulmonary:      Effort: Pulmonary effort is normal. No respiratory distress.      Breath sounds: Normal breath sounds. No wheezing or rales.   Abdominal:      General: Bowel sounds are normal. There is no distension.      Palpations: Abdomen is soft. There is no mass.      Tenderness: There is no abdominal tenderness. There is no guarding or rebound.   Musculoskeletal:         General: No deformity.      Cervical back: Neck supple.   Lymphadenopathy:      Cervical: No cervical adenopathy.   Neurological:      Mental Status: He is alert.

## 2024-12-06 NOTE — ASSESSMENT & PLAN NOTE
Lab Results   Component Value Date    HGBA1C 6.8 (H) 11/21/2024     I have counselled the pt to follow a healthy and balanced diet ,and recommend routine exercise.  Patient reports me is reduced the dose of metformin to 1000 mg once daily he is now taking berberine his A1c is stable he is tolerating these medications well he would like to try this over the next 6 months and continue to monitor I do encourage the patient to get an eye examination yearly I will be ordering diabetic laboratories including comprehensive metabolic panel, hemoglobin A1c, urine microalbumin, lipid panel.  Orders:    metFORMIN (GLUCOPHAGE) 1000 MG tablet; Take 1 tablet (1,000 mg total) by mouth daily with breakfast    Comprehensive metabolic panel; Future    Albumin / creatinine urine ratio; Future    Lipid Panel with Direct LDL reflex; Future    Hemoglobin A1c (w/out EAG) ([facility] ONLY); Future

## 2024-12-06 NOTE — PATIENT INSTRUCTIONS
Medicare Preventive Visit Patient Instructions  Thank you for completing your Welcome to Medicare Visit or Medicare Annual Wellness Visit today. Your next wellness visit will be due in one year (12/7/2025).  The screening/preventive services that you may require over the next 5-10 years are detailed below. Some tests may not apply to you based off risk factors and/or age. Screening tests ordered at today's visit but not completed yet may show as past due. Also, please note that scanned in results may not display below.  Preventive Screenings:  Service Recommendations Previous Testing/Comments   Colorectal Cancer Screening  Colonoscopy    Fecal Occult Blood Test (FOBT)/Fecal Immunochemical Test (FIT)  Fecal DNA/Cologuard Test  Flexible Sigmoidoscopy Age: 45-75 years old   Colonoscopy: every 10 years (May be performed more frequently if at higher risk)  OR  FOBT/FIT: every 1 year  OR  Cologuard: every 3 years  OR  Sigmoidoscopy: every 5 years  Screening may be recommended earlier than age 45 if at higher risk for colorectal cancer. Also, an individualized decision between you and your healthcare provider will decide whether screening between the ages of 76-85 would be appropriate. Colonoscopy: 01/16/2014  FOBT/FIT: Not on file  Cologuard: Not on file  Sigmoidoscopy: Not on file          Prostate Cancer Screening Individualized decision between patient and health care provider in men between ages of 55-69   Medicare will cover every 12 months beginning on the day after your 50th birthday PSA: 5.037 ng/mL     History Prostate Cancer     Hepatitis C Screening Once for adults born between 1945 and 1965  More frequently in patients at high risk for Hepatitis C Hep C Antibody: 01/10/2021    Screening Current   Diabetes Screening 1-2 times per year if you're at risk for diabetes or have pre-diabetes Fasting glucose: 115 mg/dL (11/21/2024)  A1C: 6.8 % (11/21/2024)  Screening Not Indicated  History Diabetes   Cholesterol  Screening Once every 5 years if you don't have a lipid disorder. May order more often based on risk factors. Lipid panel: 11/21/2024  Screening Not Indicated  History Lipid Disorder      Other Preventive Screenings Covered by Medicare:  Abdominal Aortic Aneurysm (AAA) Screening: covered once if your at risk. You're considered to be at risk if you have a family history of AAA or a male between the age of 65-75 who smoking at least 100 cigarettes in your lifetime.  Lung Cancer Screening: covers low dose CT scan once per year if you meet all of the following conditions: (1) Age 55-77; (2) No signs or symptoms of lung cancer; (3) Current smoker or have quit smoking within the last 15 years; (4) You have a tobacco smoking history of at least 20 pack years (packs per day x number of years you smoked); (5) You get a written order from a healthcare provider.  Glaucoma Screening: covered annually if you're considered high risk: (1) You have diabetes OR (2) Family history of glaucoma OR (3)  aged 50 and older OR (4)  American aged 65 and older  Osteoporosis Screening: covered every 2 years if you meet one of the following conditions: (1) Have a vertebral abnormality; (2) On glucocorticoid therapy for more than 3 months; (3) Have primary hyperparathyroidism; (4) On osteoporosis medications and need to assess response to drug therapy.  HIV Screening: covered annually if you're between the age of 15-65. Also covered annually if you are younger than 15 and older than 65 with risk factors for HIV infection. For pregnant patients, it is covered up to 3 times per pregnancy.    Immunizations:  Immunization Recommendations   Influenza Vaccine Annual influenza vaccination during flu season is recommended for all persons aged >= 6 months who do not have contraindications   Pneumococcal Vaccine   * Pneumococcal conjugate vaccine = PCV13 (Prevnar 13), PCV15 (Vaxneuvance), PCV20 (Prevnar 20)  * Pneumococcal  polysaccharide vaccine = PPSV23 (Pneumovax) Adults 19-63 yo with certain risk factors or if 65+ yo  If never received any pneumonia vaccine: recommend Prevnar 20 (PCV20)  Give PCV20 if previously received 1 dose of PCV13 or PPSV23   Hepatitis B Vaccine 3 dose series if at intermediate or high risk (ex: diabetes, end stage renal disease, liver disease)   Respiratory syncytial virus (RSV) Vaccine - COVERED BY MEDICARE PART D  * RSVPreF3 (Arexvy) CDC recommends that adults 60 years of age and older may receive a single dose of RSV vaccine using shared clinical decision-making (SCDM)   Tetanus (Td) Vaccine - COST NOT COVERED BY MEDICARE PART B Following completion of primary series, a booster dose should be given every 10 years to maintain immunity against tetanus. Td may also be given as tetanus wound prophylaxis.   Tdap Vaccine - COST NOT COVERED BY MEDICARE PART B Recommended at least once for all adults. For pregnant patients, recommended with each pregnancy.   Shingles Vaccine (Shingrix) - COST NOT COVERED BY MEDICARE PART B  2 shot series recommended in those 19 years and older who have or will have weakened immune systems or those 50 years and older     Health Maintenance Due:      Topic Date Due   • Colorectal Cancer Screening  01/16/2015   • Hepatitis C Screening  Completed     Immunizations Due:      Topic Date Due   • Pneumococcal Vaccine: 65+ Years (2 of 2 - PCV) 12/14/2019   • Influenza Vaccine (1) 09/01/2024   • COVID-19 Vaccine (1 - 2024-25 season) Never done     Advance Directives   What are advance directives?  Advance directives are legal documents that state your wishes and plans for medical care. These plans are made ahead of time in case you lose your ability to make decisions for yourself. Advance directives can apply to any medical decision, such as the treatments you want, and if you want to donate organs.   What are the types of advance directives?  There are many types of advance directives,  and each state has rules about how to use them. You may choose a combination of any of the following:  Living will:  This is a written record of the treatment you want. You can also choose which treatments you do not want, which to limit, and which to stop at a certain time. This includes surgery, medicine, IV fluid, and tube feedings.   Durable power of  for healthcare (DPAHC):  This is a written record that states who you want to make healthcare choices for you when you are unable to make them for yourself. This person, called a proxy, is usually a family member or a friend. You may choose more than 1 proxy.  Do not resuscitate (DNR) order:  A DNR order is used in case your heart stops beating or you stop breathing. It is a request not to have certain forms of treatment, such as CPR. A DNR order may be included in other types of advance directives.  Medical directive:  This covers the care that you want if you are in a coma, near death, or unable to make decisions for yourself. You can list the treatments you want for each condition. Treatment may include pain medicine, surgery, blood transfusions, dialysis, IV or tube feedings, and a ventilator (breathing machine).  Values history:  This document has questions about your views, beliefs, and how you feel and think about life. This information can help others choose the care that you would choose.  Why are advance directives important?  An advance directive helps you control your care. Although spoken wishes may be used, it is better to have your wishes written down. Spoken wishes can be misunderstood, or not followed. Treatments may be given even if you do not want them. An advance directive may make it easier for your family to make difficult choices about your care.   Cigarette Smoking and Your Health   Risks to your health if you smoke:  Nicotine and other chemicals found in tobacco damage every cell in your body. Even if you are a light smoker, you  have an increased risk for cancer, heart disease, and lung disease. If you are pregnant or have diabetes, smoking increases your risk for complications.   Benefits to your health if you stop smoking:   You decrease respiratory symptoms such as coughing, wheezing, and shortness of breath.   You reduce your risk for cancers of the lung, mouth, throat, kidney, bladder, pancreas, stomach, and cervix. If you already have cancer, you increase the benefits of chemotherapy. You also reduce your risk for cancer returning or a second cancer from developing.   You reduce your risk for heart disease, blood clots, heart attack, and stroke.   You reduce your risk for lung infections, and diseases such as pneumonia, asthma, chronic bronchitis, and emphysema.  Your circulation improves. More oxygen can be delivered to your body. If you have diabetes, you lower your risk for complications, such as kidney, artery, and eye diseases. You also lower your risk for nerve damage. Nerve damage can lead to amputations, poor vision, and blindness.  You improve your body's ability to heal and to fight infections.  For more information and support to stop smoking:   [employer]  Phone: 8- 503 - 038-8042  Web Address: www.uiu  Weight Management   Why it is important to manage your weight:  Being overweight increases your risk of health conditions such as heart disease, high blood pressure, type 2 diabetes, and certain types of cancer. It can also increase your risk for osteoarthritis, sleep apnea, and other respiratory problems. Aim for a slow, steady weight loss. Even a small amount of weight loss can lower your risk of health problems.  How to lose weight safely:  A safe and healthy way to lose weight is to eat fewer calories and get regular exercise. You can lose up about 1 pound a week by decreasing the number of calories you eat by 500 calories each day.   Healthy meal plan for weight management:  A healthy meal plan includes a  variety of foods, contains fewer calories, and helps you stay healthy. A healthy meal plan includes the following:  Eat whole-grain foods more often.  A healthy meal plan should contain fiber. Fiber is the part of grains, fruits, and vegetables that is not broken down by your body. Whole-grain foods are healthy and provide extra fiber in your diet. Some examples of whole-grain foods are whole-wheat breads and pastas, oatmeal, brown rice, and bulgur.  Eat a variety of vegetables every day.  Include dark, leafy greens such as spinach, kale, kumar greens, and mustard greens. Eat yellow and orange vegetables such as carrots, sweet potatoes, and winter squash.   Eat a variety of fruits every day.  Choose fresh or canned fruit (canned in its own juice or light syrup) instead of juice. Fruit juice has very little or no fiber.  Eat low-fat dairy foods.  Drink fat-free (skim) milk or 1% milk. Eat fat-free yogurt and low-fat cottage cheese. Try low-fat cheeses such as mozzarella and other reduced-fat cheeses.  Choose meat and other protein foods that are low in fat.  Choose beans or other legumes such as split peas or lentils. Choose fish, skinless poultry (chicken or turkey), or lean cuts of red meat (beef or pork). Before you cook meat or poultry, cut off any visible fat.   Use less fat and oil.  Try baking foods instead of frying them. Add less fat, such as margarine, sour cream, regular salad dressing and mayonnaise to foods. Eat fewer high-fat foods. Some examples of high-fat foods include french fries, doughnuts, ice cream, and cakes.  Eat fewer sweets.  Limit foods and drinks that are high in sugar. This includes candy, cookies, regular soda, and sweetened drinks.  Exercise:  Exercise at least 30 minutes per day on most days of the week. Some examples of exercise include walking, biking, dancing, and swimming. You can also fit in more physical activity by taking the stairs instead of the elevator or parking farther  away from stores. Ask your healthcare provider about the best exercise plan for you.   Narcotic (Opioid) Safety    Use narcotics safely:  Take prescribed narcotics exactly as directed  Do not give narcotics to others or take narcotics that belong to someone else  Do not mix narcotics without medicines or alcohol  Do not drive or operate heavy machinery after you take the narcotic  Monitor for side effects and notify your healthcare provider if you experienced side effects such as nausea, sleepiness, itching, or trouble thinking clearly.    Manage constipation:    Constipation is the most common side effect of narcotic medicine. Constipation is when you have hard, dry bowel movements, or you go longer than usual between bowel movements. Tell your healthcare provider about all changes in your bowel movements while you are taking narcotics. He or she may recommend laxative medicine to help you have a bowel movement. He or she may also change the kind of narcotic you are taking, or change when you take it. The following are more ways you can prevent or relieve constipation:    Drink liquids as directed.  You may need to drink extra liquids to help soften and move your bowels. Ask how much liquid to drink each day and which liquids are best for you.  Eat high-fiber foods.  This may help decrease constipation by adding bulk to your bowel movements. High-fiber foods include fruits, vegetables, whole-grain breads and cereals, and beans. Your healthcare provider or dietitian can help you create a high-fiber meal plan. Your provider may also recommend a fiber supplement if you cannot get enough fiber from food.  Exercise regularly.  Regular physical activity can help stimulate your intestines. Walking is a good exercise to prevent or relieve constipation. Ask which exercises are best for you.  Schedule a time each day to have a bowel movement.  This may help train your body to have regular bowel movements. Bend forward while  you are on the toilet to help move the bowel movement out. Sit on the toilet for at least 10 minutes, even if you do not have a bowel movement.    Store narcotics safely:   Store narcotics where others cannot easily get them.  Keep them in a locked cabinet or secure area. Do not  keep them in a purse or other bag you carry with you. A person may be looking for something else and find the narcotics.  Make sure narcotics are stored out of the reach of children.  A child can easily overdose on narcotics. Narcotics may look like candy to a small child.    The best way to dispose of narcotics:      The laws vary by country and area. In the [place], the best way is to return the narcotics through a take-back program. This program is offered by the [place] Drug Enforcement Agency (LAUREN). The following are options for using the program:  Take the narcotics to a LAUREN collection site.  The site is often a law enforcement center. Call your local law enforcement center for scheduled take-back days in your area. You will be given information on where to go if the collection site is in a different location.  Take the narcotics to an approved pharmacy or hospital.  A pharmacy or hospital may be set up as a collection site. You will need to ask if it is a LAUREN collection site if you were not directed there. A pharmacy or doctor's office may not be able to take back narcotics unless it is a LAUREN site.  Use a mail-back system.  This means you are given containers to put the narcotics into. You will then mail them in the containers.  Use a take-back drop box.  This is a place to leave the narcotics at any time. People and animals will not be able to get into the box. Your local law enforcement agency can tell you where to find a drop box in your area.    Other ways to manage pain:   Ask your healthcare provider about non-narcotic medicines to control pain.  Nonprescription medicines include NSAIDs (such as ibuprofen) and acetaminophen.  Prescription medicines include muscle relaxers, antidepressants, and steroids.  Pain may be managed without any medicines.  Some ways to relieve pain include massage, aromatherapy, or meditation. Physical or occupational therapy may also help.    For more information:   Drug Enforcement Administration  [address] [place] [address]  Phone: 5- 898 - 222-6044  Web Address: https://www.deadiversion.[facility] [place].gov/drug_disposal/    [place] Food and Drug Administration  [address] [place] [address]  Phone: 5- 339 - 479-5332  Web Address: http://www.fda.gov     © Copyright [employer] 2018 Information is for End User's use only and may not be sold, redistributed or otherwise used for commercial purposes. All illustrations and images included in CareNotes® are the copyrighted property of [employer].D.A.M., Inc. or [employer]

## 2024-12-07 PROBLEM — Z00.00 MEDICARE ANNUAL WELLNESS VISIT, SUBSEQUENT: Status: ACTIVE | Noted: 2024-12-07

## 2024-12-07 PROBLEM — M76.62 ACHILLES TENDINITIS OF BOTH LOWER EXTREMITIES: Status: ACTIVE | Noted: 2024-12-07

## 2024-12-07 PROBLEM — M76.61 ACHILLES TENDINITIS OF BOTH LOWER EXTREMITIES: Status: ACTIVE | Noted: 2024-12-07

## 2024-12-07 NOTE — ASSESSMENT & PLAN NOTE
Clinically stable and doing well continue the current medical regiment will continue monitor.  Continue antihypertensive control patient is stable and doing well we will continue to observe continue Lipitor 40 mg once daily

## 2024-12-07 NOTE — ASSESSMENT & PLAN NOTE
Lab Results   Component Value Date    HGBA1C 6.8 (H) 11/21/2024     Return to office 6  months  call if any problems

## 2024-12-07 NOTE — ASSESSMENT & PLAN NOTE
I have counseled the patient to quit smoking, I have recommended that the patient set up a quit date and I have asked the patient to cut down the cigarettes until the quit date.

## 2024-12-07 NOTE — ASSESSMENT & PLAN NOTE
Patient's symptoms are consistent with a mild Achilles tendinitis Achilles tendon is intact related to putting his ankles over the bedpost I have counseled patient to reposition his body for sleep, I have given him some stretching exercises he can use Tylenol as directed symptoms are improving next 2 to 4 weeks he will contact me for formal physical therapy

## 2024-12-07 NOTE — ASSESSMENT & PLAN NOTE
Assessment and plan 1.  Medicare subsequent annual wellness examination overall the patient is clinically stable and doing well, we encouraged the patient to follow a healthy and balanced diet.  We recommend that the patient exercise routinely approximately 30 minutes 5 times per week .  We have reviewed the patient's vaccines and have made recommendations for updates if necessary   patient would like to hold off on pneumonia shot   .  We will be ordering screening laboratories which are age appropriate.  Return to the office in   6 months   call if any problems.

## 2025-01-06 PROBLEM — Z00.00 MEDICARE ANNUAL WELLNESS VISIT, SUBSEQUENT: Status: RESOLVED | Noted: 2024-12-07 | Resolved: 2025-01-06

## 2025-02-09 DIAGNOSIS — I10 ESSENTIAL HYPERTENSION: ICD-10-CM

## 2025-02-10 RX ORDER — HYDROCHLOROTHIAZIDE 12.5 MG/1
12.5 CAPSULE ORAL DAILY
Qty: 90 CAPSULE | Refills: 1 | Status: SHIPPED | OUTPATIENT
Start: 2025-02-10

## 2025-02-10 RX ORDER — LISINOPRIL 40 MG/1
40 TABLET ORAL DAILY
Qty: 90 TABLET | Refills: 1 | Status: SHIPPED | OUTPATIENT
Start: 2025-02-10

## 2025-05-06 ENCOUNTER — OFFICE VISIT (OUTPATIENT)
Dept: URGENT CARE | Age: 70
End: 2025-05-06
Payer: MEDICARE

## 2025-05-06 ENCOUNTER — APPOINTMENT (EMERGENCY)
Dept: CT IMAGING | Facility: HOSPITAL | Age: 70
End: 2025-05-06
Payer: MEDICARE

## 2025-05-06 ENCOUNTER — HOSPITAL ENCOUNTER (EMERGENCY)
Facility: HOSPITAL | Age: 70
Discharge: HOME | End: 2025-05-06
Attending: EMERGENCY MEDICINE
Payer: MEDICARE

## 2025-05-06 VITALS
TEMPERATURE: 97.8 F | OXYGEN SATURATION: 95 % | RESPIRATION RATE: 22 BRPM | SYSTOLIC BLOOD PRESSURE: 166 MMHG | HEART RATE: 78 BPM | DIASTOLIC BLOOD PRESSURE: 83 MMHG

## 2025-05-06 VITALS
OXYGEN SATURATION: 95 % | TEMPERATURE: 98 F | SYSTOLIC BLOOD PRESSURE: 125 MMHG | HEART RATE: 79 BPM | DIASTOLIC BLOOD PRESSURE: 67 MMHG | RESPIRATION RATE: 18 BRPM

## 2025-05-06 DIAGNOSIS — N28.1 RENAL CYST: ICD-10-CM

## 2025-05-06 DIAGNOSIS — K57.90 DIVERTICULOSIS: ICD-10-CM

## 2025-05-06 DIAGNOSIS — R10.32 LEFT INGUINAL PAIN: Primary | ICD-10-CM

## 2025-05-06 DIAGNOSIS — R10.32 LEFT GROIN PAIN: Primary | ICD-10-CM

## 2025-05-06 LAB
ALBUMIN SERPL BCG-MCNC: 4.5 G/DL (ref 3.5–5)
ALP SERPL-CCNC: 109 U/L (ref 34–104)
ALT SERPL W P-5'-P-CCNC: 22 U/L (ref 7–52)
ANION GAP SERPL CALCULATED.3IONS-SCNC: 8 MMOL/L (ref 4–13)
AST SERPL W P-5'-P-CCNC: 27 U/L (ref 13–39)
BASOPHILS # BLD MANUAL: 0 THOUSAND/UL (ref 0–0.1)
BASOPHILS NFR MAR MANUAL: 0 % (ref 0–1)
BILIRUB SERPL-MCNC: 0.76 MG/DL (ref 0.2–1)
BUN SERPL-MCNC: 21 MG/DL (ref 5–25)
CALCIUM SERPL-MCNC: 9.9 MG/DL (ref 8.4–10.2)
CHLORIDE SERPL-SCNC: 104 MMOL/L (ref 96–108)
CO2 SERPL-SCNC: 23 MMOL/L (ref 21–32)
CREAT SERPL-MCNC: 0.74 MG/DL (ref 0.6–1.3)
EOSINOPHIL # BLD MANUAL: 0.13 THOUSAND/UL (ref 0–0.4)
EOSINOPHIL NFR BLD MANUAL: 1 % (ref 0–6)
ERYTHROCYTE [DISTWIDTH] IN BLOOD BY AUTOMATED COUNT: 14 % (ref 11.6–15.1)
GFR SERPL CREATININE-BSD FRML MDRD: 94 ML/MIN/1.73SQ M
GLUCOSE SERPL-MCNC: 174 MG/DL (ref 65–140)
HCT VFR BLD AUTO: 48.2 % (ref 36.5–49.3)
HGB BLD-MCNC: 16.2 G/DL (ref 12–17)
LYMPHOCYTES # BLD AUTO: 28 % (ref 14–44)
LYMPHOCYTES # BLD AUTO: 4.06 THOUSAND/UL (ref 0.6–4.47)
MCH RBC QN AUTO: 29.5 PG (ref 26.8–34.3)
MCHC RBC AUTO-ENTMCNC: 33.6 G/DL (ref 31.4–37.4)
MCV RBC AUTO: 88 FL (ref 82–98)
MONOCYTES # BLD AUTO: 1.27 THOUSAND/UL (ref 0–1.22)
MONOCYTES NFR BLD: 10 % (ref 4–12)
NEUTROPHILS # BLD MANUAL: 7.23 THOUSAND/UL (ref 1.85–7.62)
NEUTS BAND NFR BLD MANUAL: 1 % (ref 0–8)
NEUTS SEG NFR BLD AUTO: 56 % (ref 43–75)
PLATELET # BLD AUTO: 279 THOUSANDS/UL (ref 149–390)
PLATELET BLD QL SMEAR: ADEQUATE
PMV BLD AUTO: 8.7 FL (ref 8.9–12.7)
POTASSIUM SERPL-SCNC: 5.1 MMOL/L (ref 3.5–5.3)
PROT SERPL-MCNC: 7.6 G/DL (ref 6.4–8.4)
RBC # BLD AUTO: 5.5 MILLION/UL (ref 3.88–5.62)
RBC MORPH BLD: NORMAL
SMUDGE CELLS BLD QL SMEAR: PRESENT
SODIUM SERPL-SCNC: 135 MMOL/L (ref 135–147)
VARIANT LYMPHS # BLD AUTO: 4 %
WBC # BLD AUTO: 12.69 THOUSAND/UL (ref 4.31–10.16)

## 2025-05-06 PROCEDURE — 80053 COMPREHEN METABOLIC PANEL: CPT | Performed by: EMERGENCY MEDICINE

## 2025-05-06 PROCEDURE — 99284 EMERGENCY DEPT VISIT MOD MDM: CPT

## 2025-05-06 PROCEDURE — 74177 CT ABD & PELVIS W/CONTRAST: CPT

## 2025-05-06 PROCEDURE — 99284 EMERGENCY DEPT VISIT MOD MDM: CPT | Performed by: EMERGENCY MEDICINE

## 2025-05-06 PROCEDURE — 85007 BL SMEAR W/DIFF WBC COUNT: CPT | Performed by: EMERGENCY MEDICINE

## 2025-05-06 PROCEDURE — 85027 COMPLETE CBC AUTOMATED: CPT | Performed by: EMERGENCY MEDICINE

## 2025-05-06 PROCEDURE — G0463 HOSPITAL OUTPT CLINIC VISIT: HCPCS

## 2025-05-06 PROCEDURE — 99213 OFFICE O/P EST LOW 20 MIN: CPT

## 2025-05-06 PROCEDURE — 36415 COLL VENOUS BLD VENIPUNCTURE: CPT | Performed by: EMERGENCY MEDICINE

## 2025-05-06 RX ORDER — LIDOCAINE 50 MG/G
1 PATCH TOPICAL EVERY 24 HOURS
Qty: 15 PATCH | Refills: 0 | Status: SHIPPED | OUTPATIENT
Start: 2025-05-06

## 2025-05-06 RX ORDER — NAPROXEN 500 MG/1
500 TABLET ORAL 2 TIMES DAILY WITH MEALS
Qty: 30 TABLET | Refills: 0 | Status: SHIPPED | OUTPATIENT
Start: 2025-05-06

## 2025-05-06 RX ORDER — KETOROLAC TROMETHAMINE 30 MG/ML
15 INJECTION, SOLUTION INTRAMUSCULAR; INTRAVENOUS ONCE
Status: DISCONTINUED | OUTPATIENT
Start: 2025-05-06 | End: 2025-05-06 | Stop reason: HOSPADM

## 2025-05-06 RX ADMIN — IOHEXOL 73 ML: 350 INJECTION, SOLUTION INTRAVENOUS at 12:03

## 2025-05-06 NOTE — ED ATTENDING ATTESTATION
5/6/2025  IJimmie DO, saw and evaluated the patient. I have discussed the patient with the resident/non-physician practitioner and agree with the resident's/non-physician practitioner's findings, Plan of Care, and MDM as documented in the resident's/non-physician practitioner's note, except where noted. All available labs and Radiology studies were reviewed.  I was present for key portions of any procedure(s) performed by the resident/non-physician practitioner and I was immediately available to provide assistance.       At this point I agree with the current assessment done in the Emergency Department.  I have conducted an independent evaluation of this patient a history and physical is as follows:    69-year-old male presenting to the emergency department with left inguinal pain that started yesterday.  He mentions history of left inguinal hernia repair with mesh over 25 years ago.  States 2 weeks ago he had a sinus infection he was coughing a lot, and over the weekend he went deer hunting, but the pain started yesterday.  He thinks he may have strained it.  Denies feeling any bulge or lumps or mass.  Denies any scrotal or penile pain.  States is worse with putting weight on his leg or bending a certain way.  No nausea or vomiting or numbness or tingling or weakness.  No trauma.    Upon physical examination, patient overall appears well, not in acute distress, heart regular rate, no increased work of breathing, abdomen soft nontender, no obvious mass or hernia palpated on examination, left inguinal region is benign without any tenderness or overlying skin changes, scrotal exam is benign as well.    Blood work with minimal leukocytosis, nonspecific, otherwise reassuring.  CT imaging results as below.  Patient updated results.  Advised PCP follow-up.  Return precautions were discussed.      ED Course         Critical Care Time  Procedures    CT abdomen pelvis with contrast   Final Result      No acute  findings in the abdomen or pelvis. Colonic diverticulosis without diverticulitis.      Indeterminate 1.7 cm left renal hyperdense cyst. Recommend follow-up nonemergent ultrasound for further evaluation.      Prostatomegaly.         The study was marked in EPIC for immediate notification and follow-up.         Workstation performed: [id number]

## 2025-05-06 NOTE — DISCHARGE INSTRUCTIONS
Your CT showed: No acute findings in the abdomen or pelvis. Colonic diverticulosis without diverticulitis., Indeterminate 1.7 cm left renal hyperdense cyst.     Please follow up with your primary care provider concerning your visit today within the next 1-3 days.  Please return to the Emergency Department for any other concerns or worsening symptoms.     An ambulatory referral to urology has been placed for you, you should be contacted in 1 to 2 days in order to set up an appointment, if you are not contacted in that time please contact the provided clinic number.

## 2025-05-06 NOTE — ED PROVIDER NOTES
Time reflects when diagnosis was documented in both MDM as applicable and the Disposition within this note       Time User Action Codes Description Comment    5/6/2025 12:49 PM de Quentin Jaeger Add [R10.32] Left groin pain     5/6/2025 12:50 PM Quentin Kerr Add [K57.90] Diverticulosis     5/6/2025 12:50 PM de Quentin Jaeger Add [N28.1] Renal cyst           ED Disposition       ED Disposition   Discharge    Condition   Stable    Date/Time   Tue May 6, 2025 12:49 PM    Comment   Chepe Lechuga Jr. discharge to home/self care.                   Assessment & Plan       Medical Decision Making  69-year-old male with past medical history of left inguinal hernia repair with mesh presents with left groin pain.  Differential diagnosis includes but is not limited to mesh failure, occult hernia, muscle strain.  Patient seen and examined, obtained laboratory analysis which showed no acute pertinent findings.  CT obtained with evidence of renal cyst however no other acute abdominal pathology.  Patient provided with referral to urology as well as information for outpatient general surgery. Patient appears well, nontoxic, agrees with plan of care at this time.  Answered all questions.  In light of this, patient would benefit from outpatient follow-up.    Amount and/or Complexity of Data Reviewed  Labs: ordered. Decision-making details documented in ED Course.  Radiology: ordered. Decision-making details documented in ED Course.    Risk  Prescription drug management.        ED Course as of 05/06/25 1705   Tue May 06, 2025   1058 WBC(!): 12.69  Mild leukocytosis   1238 CT abdomen pelvis with contrast  IMPRESSION:     No acute findings in the abdomen or pelvis. Colonic diverticulosis without diverticulitis.     Indeterminate 1.7 cm left renal hyperdense cyst. Recommend follow-up nonemergent ultrasound for further evaluation.     Prostatomegaly.            Medications   iohexol (OMNIPAQUE) 350 MG/ML injection  (MULTI-DOSE) 73 mL (73 mL Intravenous Given 5/6/25 1203)       ED Risk Strat Scores                    No data recorded                            History of Present Illness       Chief Complaint   Patient presents with    Groin Pain     Left sided groin pain worse with movement. Started yesterday. Pt concern strained muscle when hunting        Past Medical History:   Diagnosis Date    Adenocarcinoma of prostate (HCC)     [id number]  LAST ASSESSED    Diabetes mellitus (HCC)     Hypertension       Past Surgical History:   Procedure Laterality Date    COLON SURGERY      HERNIA REPAIR      SHOULDER SURGERY      TONSILLECTOMY      TONSILLECTOMY AND ADENOIDECTOMY        Family History   Problem Relation Age of Onset    Diabetes Father         MELLITUS      Social History     Tobacco Use    Smoking status: Every Day     Current packs/day: 0.50     Types: Cigarettes     Passive exposure: Current    Smokeless tobacco: Never   Vaping Use    Vaping status: Never Used   Substance Use Topics    Alcohol use: Not Currently    Drug use: No      E-Cigarette/Vaping    E-Cigarette Use Never User       E-Cigarette/Vaping Substances    Nicotine No     THC No     CBD No     Flavoring No     Other No     Unknown No       I have reviewed and agree with the history as documented.     (Chepe Lechuga Jr.) Chepe Lechuga Jr. is a 69 y.o. male     They presented to the emergency department on May 6, 2025. Patient presents with:  Groin Pain: Left sided groin pain worse with movement. Started yesterday. Pt concern strained muscle when hunting.    The patient states that he has a history of left inguinal hernia with mesh repair many years ago, does not remember when or who performed the surgery, however for the past couple days he has been experiencing left groin discomfort.  Patient states that this happened after he had been hunting and carrying a turkey and is unsure if he may have strained himself. Patient denies fever, chills, chest  pain, difficulty breathing, other abdominal pain, nausea, vomiting, change in bowel habits, change urination, or any other complaint at this time.              Review of Systems   Constitutional:  Negative for chills and fever.   HENT:  Negative for ear pain and sore throat.    Eyes:  Negative for pain and visual disturbance.   Respiratory:  Negative for cough and shortness of breath.    Cardiovascular:  Negative for chest pain and palpitations.   Gastrointestinal:  Positive for abdominal pain. Negative for vomiting.   Genitourinary:  Negative for dysuria and hematuria.   Musculoskeletal:  Negative for arthralgias and back pain.   Skin:  Negative for color change and rash.   Neurological:  Negative for seizures and syncope.   All other systems reviewed and are negative.          Objective       ED Triage Vitals [05/06/25 1035]   Temperature Pulse Blood Pressure Respirations SpO2 Patient Position - Orthostatic VS   98 °F (36.7 °C) 79 125/67 18 95 % --      Temp src Heart Rate Source BP Location FiO2 (%) Pain Score    -- -- -- -- --      Vitals      Date and Time Temp Pulse SpO2 Resp BP Pain Score FACES Pain Rating User   05/06/25 1035 98 °F (36.7 °C) 79 95 % 18 125/67 -- -- MD            Physical Exam  Vitals and nursing note reviewed.   Constitutional:       General: He is not in acute distress.     Appearance: Normal appearance.   HENT:      Head: Normocephalic and atraumatic.      Right Ear: External ear normal.      Left Ear: External ear normal.      Nose: Nose normal.      Mouth/Throat:      Mouth: Mucous membranes are moist.   Eyes:      Conjunctiva/sclera: Conjunctivae normal.   Cardiovascular:      Rate and Rhythm: Normal rate and regular rhythm.   Pulmonary:      Effort: Pulmonary effort is normal. No respiratory distress.      Breath sounds: Normal breath sounds.   Abdominal:      General: Abdomen is flat. Bowel sounds are normal.      Tenderness: There is no abdominal tenderness. There is no guarding or  rebound.   Genitourinary:     Penis: Normal.       Testes: Normal.      Comments: Mild tenderness to palpation immediately inferior to the left inguinal ligament at proximal edge of scrotum, no palpable hernia, no discoloration/ecchymosis/erythema  Musculoskeletal:         General: Normal range of motion.      Cervical back: Normal range of motion.   Skin:     General: Skin is warm and dry.      Capillary Refill: Capillary refill takes less than 2 seconds.   Neurological:      Mental Status: He is alert. Mental status is at baseline.   Psychiatric:         Mood and Affect: Mood normal.         Results Reviewed       Procedure Component Value Units Date/Time    RBC Morphology Reflex Test [[id number]] Collected: 05/06/25 1050    Lab Status: Final result Specimen: Blood from Arm, Right Updated: 05/06/25 1301    CBC and differential [[id number]]  (Abnormal) Collected: 05/06/25 1050    Lab Status: Final result Specimen: Blood from Arm, Right Updated: 05/06/25 1216     WBC 12.69 Thousand/uL      RBC 5.50 Million/uL      Hemoglobin 16.2 g/dL      Hematocrit 48.2 %      MCV 88 fL      MCH 29.5 pg      MCHC 33.6 g/dL      RDW 14.0 %      MPV 8.7 fL      Platelets 279 Thousands/uL     Narrative:      This is an appended report.  These results have been appended to a previously verified report.    Manual Differential(PHLEBS Do Not Order) [[id number]]  (Abnormal) Collected: 05/06/25 1050    Lab Status: Final result Specimen: Blood from Arm, Right Updated: 05/06/25 1216     Segmented % 56 %      Bands % 1 %      Lymphocytes % 28 %      Monocytes % 10 %      Eosinophils % 1 %      Basophils % 0 %      Atypical Lymphocytes % 4 %      Absolute Neutrophils 7.23 Thousand/uL      Absolute Lymphocytes 4.06 Thousand/uL      Absolute Monocytes 1.27 Thousand/uL      Absolute Eosinophils 0.13 Thousand/uL      Absolute Basophils 0.00 Thousand/uL      Total Counted --     Smudge Cells Present     RBC Morphology Normal     Platelet Estimate  Adequate    Comprehensive metabolic panel [[id number]]  (Abnormal) Collected: 05/06/25 1050    Lab Status: Final result Specimen: Blood from Arm, Right Updated: 05/06/25 1150     Sodium 135 mmol/L      Potassium 5.1 mmol/L      Chloride 104 mmol/L      CO2 23 mmol/L      ANION GAP 8 mmol/L      BUN 21 mg/dL      Creatinine 0.74 mg/dL      Glucose 174 mg/dL      Calcium 9.9 mg/dL      AST 27 U/L      ALT 22 U/L      Alkaline Phosphatase 109 U/L      Total Protein 7.6 g/dL      Albumin 4.5 g/dL      Total Bilirubin 0.76 mg/dL      eGFR 94 ml/min/1.73sq m     Narrative:      [employer] guidelines for Chronic Kidney Disease (CKD):     Stage 1 with normal or high GFR (GFR > 90 mL/min/1.73 square meters)    Stage 2 Mild CKD (GFR = 60-89 mL/min/1.73 square meters)    Stage 3A Moderate CKD (GFR = 45-59 mL/min/1.73 square meters)    Stage 3B Moderate CKD (GFR = 30-44 mL/min/1.73 square meters)    Stage 4 Severe CKD (GFR = 15-29 mL/min/1.73 square meters)    Stage 5 End Stage CKD (GFR <15 mL/min/1.73 square meters)  Note: GFR calculation is accurate only with a steady state creatinine            CT abdomen pelvis with contrast   Final Interpretation by Jacob Starr MD (05/06 1229)      No acute findings in the abdomen or pelvis. Colonic diverticulosis without diverticulitis.      Indeterminate 1.7 cm left renal hyperdense cyst. Recommend follow-up nonemergent ultrasound for further evaluation.      Prostatomegaly.         The study was marked in EPIC for immediate notification and follow-up.         Workstation performed: [id number]             Procedures    ED Medication and Procedure Management   Prior to Admission Medications   Prescriptions Last Dose Informant Patient Reported? Taking?   Berberine Chloride (Berberine HCI) 500 MG CAPS   Yes No   Sig: Take 500 capsules by mouth daily with breakfast   Cholecalciferol (VITAMIN D3) 1000 units CAPS  Self Yes No   Sig: Take 1 capsule by mouth daily    Coenzyme Q10 (Co Q10) 100 MG CAPS  Self Yes No   Sig: Take by mouth   Menthol-Methyl Salicylate (Icy Hot) 10-30 % STCK  Self Yes No   Sig: Apply 1 application. topically daily as needed for pain   Misc Natural Products (PROSTATE HEALTH PO)   Yes No   Sig: Take by mouth   Multiple Vitamin (MULTIVITAMIN) tablet  Self Yes No   Sig: Take 1 tablet by mouth daily   VITAMIN B COMPLEX-C CAPS  Self Yes No   Sig: Take 1 capsule by mouth daily   albuterol (PROVENTIL HFA,VENTOLIN HFA) 90 mcg/act inhaler  Self No No   Sig: TAKE 2 PUFFS BY MOUTH EVERY 6 HOURS AS NEEDED FOR WHEEZE   amLODIPine (NORVASC) 10 mg tablet   No No   Sig: TAKE 1 TABLET BY MOUTH EVERY DAY   aspirin 81 mg chewable tablet  Self No No   Sig: Chew 1 tablet (81 mg total) daily Do not start before May 2, 2023.   atorvastatin (LIPITOR) 40 mg tablet   No No   Sig: TAKE 1 TABLET BY MOUTH EVERY DAY   cetirizine (ZyrTEC) 10 mg tablet   No No   Sig: Take 1 tablet (10 mg total) by mouth daily   clindamycin (CLEOCIN) 300 MG capsule   Yes No   Sig: Take 1 capsule by mouth 4 times a day   cyanocobalamin (VITAMIN B-12) 1,000 mcg tablet  Self Yes No   Sig: Take by mouth daily   dapagliflozin 5 MG TABS   No No   Sig: Take 1 tablet (5 mg total) by mouth daily   hydroCHLOROthiazide 12.5 mg capsule   No No   Sig: TAKE 1 CAPSULE BY MOUTH EVERY DAY   lisinopril (ZESTRIL) 40 mg tablet   No No   Sig: TAKE 1 TABLET BY MOUTH EVERY DAY   metFORMIN (GLUCOPHAGE) 1000 MG tablet   No No   Sig: Take 1 tablet (1,000 mg total) by mouth daily with breakfast      Facility-Administered Medications: None     Discharge Medication List as of 5/6/2025 12:52 PM        START taking these medications    Details   lidocaine (LIDODERM) 5 % Apply 1 patch topically over 12 hours every 24 hours Remove & Discard patch within 12 hours or as directed by MD, Starting Tue 5/6/2025, Normal      naproxen (Naprosyn) 500 mg tablet Take 1 tablet (500 mg total) by mouth 2 (two) times a day with meals, Starting Tue  5/6/2025, Normal           CONTINUE these medications which have NOT CHANGED    Details   albuterol (PROVENTIL HFA,VENTOLIN HFA) 90 mcg/act inhaler TAKE 2 PUFFS BY MOUTH EVERY 6 HOURS AS NEEDED FOR WHEEZE, Normal      amLODIPine (NORVASC) 10 mg tablet TAKE 1 TABLET BY MOUTH EVERY DAY, Normal      aspirin 81 mg chewable tablet Chew 1 tablet (81 mg total) daily Do not start before May 2, 2023., Starting Tue 5/2/2023, Until Thu 11/16/2023, Normal      atorvastatin (LIPITOR) 40 mg tablet TAKE 1 TABLET BY MOUTH EVERY DAY, Starting Sat 8/17/2024, Normal      Berberine Chloride (Berberine HCI) 500 MG CAPS Take 500 capsules by mouth daily with breakfast, Historical Med      cetirizine (ZyrTEC) 10 mg tablet Take 1 tablet (10 mg total) by mouth daily, Starting Tue 12/19/2023, Normal      Cholecalciferol (VITAMIN D3) 1000 units CAPS Take 1 capsule by mouth daily, Starting Tue 4/21/2015, Historical Med      clindamycin (CLEOCIN) 300 MG capsule Take 1 capsule by mouth 4 times a day, Starting Mon 11/25/2024, Historical Med      Coenzyme Q10 (Co Q10) 100 MG CAPS Take by mouth, Historical Med      cyanocobalamin (VITAMIN B-12) 1,000 mcg tablet Take by mouth daily, Historical Med      dapagliflozin 5 MG TABS Take 1 tablet (5 mg total) by mouth daily, Starting Mon 2/5/2024, Until Thu 1/30/2025, Normal      hydroCHLOROthiazide 12.5 mg capsule TAKE 1 CAPSULE BY MOUTH EVERY DAY, Starting Mon 2/10/2025, Normal      lisinopril (ZESTRIL) 40 mg tablet TAKE 1 TABLET BY MOUTH EVERY DAY, Starting Mon 2/10/2025, Normal      Menthol-Methyl Salicylate (Icy Hot) 10-30 % STCK Apply 1 application. topically daily as needed for pain, Historical Med      metFORMIN (GLUCOPHAGE) 1000 MG tablet Take 1 tablet (1,000 mg total) by mouth daily with breakfast, Starting Fri 12/6/2024, No Print      Misc Natural Products (PROSTATE HEALTH PO) Take by mouth, Historical Med      Multiple Vitamin (MULTIVITAMIN) tablet Take 1 tablet by mouth daily, Historical Med       VITAMIN B COMPLEX-C CAPS Take 1 capsule by mouth daily, Historical Med             ED SEPSIS DOCUMENTATION   Time reflects when diagnosis was documented in both MDM as applicable and the Disposition within this note       Time User Action Codes Description Comment    5/6/2025 12:49 PM Quentin Kerr [R10.32] Left groin pain     5/6/2025 12:50 PM Quentin Kerr [K57.90] Diverticulosis     5/6/2025 12:50 PM Quentin Kerr [N28.1] Renal cyst                  Quentin Kerr MD  05/06/25 4787

## 2025-05-06 NOTE — PROGRESS NOTES
[facility]  Name: Chepe Lechuga Jr.      : 1955      MRN: [id number]  Encounter Provider: RUSS Telles  Encounter Date: 2025   Encounter department: [facility] [place]  :  Report to [facility] emergency department for further evaluation.   Assessment & Plan  Left inguinal pain    Orders:    Transfer to other facility        Patient Instructions  Patient Education     Groin Hernia Discharge Instructions   About this topic   The belly wall covers the center of your body. It keeps your stomach and other body organs in place. Sometimes, your belly wall becomes weak. If this happens in the area near the top of your leg, you may get a groin hernia. The area between your belly and your upper leg is your groin. Part of an organ may bulge or swell through the weak spot in the groin and get stuck. A hernia repair surgery fixes this weakness in the groin. Then, your organs stay in place. You may have this on one side of your body or on both sides.     What care is needed at home?   Ask your doctor what you need to do when you go home. Make sure you ask questions if you do not understand what the doctor says. This way you will know what you need to do.  Make sure to treat allergies, long-term hard stools, or cough. This may help lower the chance of hernia recurrence.  Do not wear tight clothing over your hernia. Ask your doctor about wearing a binder to help keep your hernia in place.  Talk with your doctor about how much you can lift. Your doctor may want you to gradually return to your normal activity or they may restrict how much you can lift for a period of time.  What follow-up care is needed?   Your doctor may ask you to make visits to the office to check on your progress. Be sure to keep these visits.  What drugs may be needed?   The doctor may order drugs to:  Help with pain  Fight an infection  Soften stool  Will physical activity be limited?   You may need  to rest for a while. You should not do physical activity that makes your health problem worse. If you run, work out, or play sports, you may not be able to do those things until your health problem gets better. Avoid activities that cause you to strain. Talk to your doctor about the right amount of activity for you.  What changes to diet are needed?   Eat foods high in fiber. These include grains, bran, cereals, and fresh fruits and vegetables. Your doctor can talk with you about changes in your diet.  Drink 8 to 10 glasses of water each day.  What problems could happen?   Infection  Scarring  Injury to nearby organs  Bowel blockage or obstruction  What can be done to prevent this health problem?   Keep a healthy weight. Lose weight if you are overweight.  Avoid smoking. Ask for help if you are having problems quitting.  Avoid straining when having a bowel movement.  When do I need to call the doctor?   Signs of infection. These include a fever of 100.4°F (38°C) or higher, chills.  Groin pain gets worse all of a sudden  Severe abdominal pain  Throwing up or not able to pass stool  Trouble passing urine  Teach Back: Helping You Understand   The Teach Back Method helps you understand the information we are giving you. After you talk with the staff, tell them in your own words what you learned. This helps to make sure the staff has described each thing clearly. It also helps to explain things that may have been confusing. Before going home, make sure you can do these:  I can tell you about my condition.  I can tell you what activities are best for me.  I can tell you what I will do if I have a fever, chills, or pain.  Last Reviewed Date   2023-03-03  Consumer Information Use and Disclaimer   This generalized information is a limited summary of diagnosis, treatment, and/or medication information. It is not meant to be comprehensive and should be used as a tool to help the user understand and/or assess potential  diagnostic and treatment options. It does NOT include all information about conditions, treatments, medications, side effects, or risks that may apply to a specific patient. It is not intended to be medical advice or a substitute for the medical advice, diagnosis, or treatment of a health care provider based on the health care provider's examination and assessment of a patient’s specific and unique circumstances. Patients must speak with a health care provider for complete information about their health, medical questions, and treatment options, including any risks or benefits regarding use of medications. This information does not endorse any treatments or medications as safe, effective, or approved for treating a specific patient. [employer] and its affiliates disclaim any warranty or liability relating to this information or the use thereof. The use of this information is governed by the Terms of Use, available at https://www.[employer]/en/know/clinical-effectiveness-terms   Copyright   Follow up with PCP in 3-5 days.  Proceed to  ER if symptoms worsen.    If tests are performed, our office will contact you with results only if changes need to made to the care plan discussed with you at the visit. [facility]    Chief Complaint:   Chief Complaint   Patient presents with    Groin Pain     Patient notes that Monday he felt pressure of the left groin. He notes that he was hunting this weekend with heavy lifting. He notes history of a hernia. He has extreme pain when ambulating unless he applies pressure to the area.      History of Present Illness   Groin Pain  The patient's pertinent negatives include no genital injury, genital itching, genital lesions, pelvic pain, penile discharge, penile pain, priapism, scrotal swelling or testicular pain. Primary symptoms comment: Left groin pain which is aggravated by standing/walking.. This is a new (intermittent since  Sunday, worsening today) problem. The current episode started in the past 7 days (x 2-3 days). The problem occurs intermittently. The problem has been gradually worsening. The pain is moderate. Pertinent negatives include no abdominal pain, anorexia, chest pain, chills, constipation, coughing, diarrhea, discolored urine, dysuria, fever, flank pain, frequency, headaches, hematuria, hesitancy, joint pain, joint swelling, nausea, painful intercourse, rash, shortness of breath, sore throat, urgency, urinary retention or vomiting. Exacerbated by: Standing/walking. Treatments tried: Application of pressure. The treatment provided moderate relief.         Review of Systems   Constitutional:  Negative for chills, fatigue and fever.   HENT:  Negative for congestion, ear discharge, ear pain, postnasal drip, rhinorrhea, sinus pressure, sinus pain, sneezing and sore throat.    Eyes: Negative.  Negative for pain, discharge, redness and itching.   Respiratory: Negative.  Negative for apnea, cough, choking, chest tightness, shortness of breath, wheezing and stridor.    Cardiovascular: Negative.  Negative for chest pain and palpitations.   Gastrointestinal: Negative.  Negative for abdominal pain, anorexia, constipation, diarrhea, nausea and vomiting.   Endocrine: Negative.  Negative for polydipsia, polyphagia and polyuria.   Genitourinary: Negative.  Negative for decreased urine volume, difficulty urinating, dysuria, enuresis, flank pain, frequency, genital sores, hematuria, hesitancy, pelvic pain, penile discharge, penile pain, penile swelling, scrotal swelling, testicular pain and urgency.        Groin pain   Musculoskeletal: Negative.  Negative for arthralgias, back pain, joint pain, myalgias, neck pain and neck stiffness.   Skin: Negative.  Negative for color change and rash.   Allergic/Immunologic: Negative.  Negative for environmental allergies.   Neurological: Negative.  Negative for dizziness, facial asymmetry,  light-headedness, numbness and headaches.   Hematological: Negative.  Negative for adenopathy.   Psychiatric/Behavioral: Negative.       Past Medical History   Past Medical History:   Diagnosis Date    Adenocarcinoma of prostate (HCC)     [id number]  LAST ASSESSED    Diabetes mellitus (HCC)     Hypertension      Past Surgical History:   Procedure Laterality Date    COLON SURGERY      HERNIA REPAIR      SHOULDER SURGERY      TONSILLECTOMY      TONSILLECTOMY AND ADENOIDECTOMY       Family History   Problem Relation Age of Onset    Diabetes Father         MELLITUS     he reports that he has been smoking cigarettes. He has been exposed to tobacco smoke. He has never used smokeless tobacco. He reports that he does not currently use alcohol. He reports that he does not use drugs.  Current Outpatient Medications   Medication Instructions    albuterol (PROVENTIL HFA,VENTOLIN HFA) 90 mcg/act inhaler TAKE 2 PUFFS BY MOUTH EVERY 6 HOURS AS NEEDED FOR WHEEZE    amLODIPine (NORVASC) 10 mg tablet TAKE 1 TABLET BY MOUTH EVERY DAY    aspirin 81 mg, Oral, Daily    atorvastatin (LIPITOR) 40 mg, Oral, Daily    Berberine Chloride (Berberine HCI) 500 MG CAPS 500 capsules, Daily with breakfast    cetirizine (ZYRTEC) 10 mg, Oral, Daily    Cholecalciferol (VITAMIN D3) 1000 units CAPS 1 capsule, Daily    clindamycin (CLEOCIN) 300 MG capsule 1 capsule, 4 times daily    Coenzyme Q10 (Co Q10) 100 MG CAPS Take by mouth    cyanocobalamin (VITAMIN B-12) 1,000 mcg tablet Daily    dapagliflozin 5 mg, Oral, Daily    hydroCHLOROthiazide 12.5 mg, Oral, Daily    lisinopril (ZESTRIL) 40 mg, Oral, Daily    Menthol-Methyl Salicylate (Icy Hot) 10-30 % STCK Daily PRN    metFORMIN (GLUCOPHAGE) 1,000 mg, Oral, Daily with breakfast    Misc Natural Products (PROSTATE HEALTH PO) Take by mouth    Multiple Vitamin (MULTIVITAMIN) tablet 1 tablet, Daily    VITAMIN B COMPLEX-C CAPS 1 capsule, Daily     Allergies   Allergen Reactions    Penicillins      Other  reaction(s): Other (See Comments)  Unknown  Other reaction(s): Other (See Comments)  Unknown  Other reaction(s): Unknown  Category: Allergy;         Objective   /83   Pulse 78   Temp 97.8 °F (36.6 °C)   Resp 22   SpO2 95%      Physical Exam  Vitals and nursing note reviewed.   Constitutional:       General: He is not in acute distress.     Appearance: He is well-developed. He is not ill-appearing, toxic-appearing or diaphoretic.      Interventions: He is not intubated.  HENT:      Head: Normocephalic and atraumatic.   Eyes:      Conjunctiva/sclera: Conjunctivae normal.   Cardiovascular:      Rate and Rhythm: Normal rate and regular rhythm.      Heart sounds: Normal heart sounds, S1 normal and S2 normal. Heart sounds not distant. No murmur heard.  Pulmonary:      Effort: Pulmonary effort is normal. No tachypnea, bradypnea, accessory muscle usage, prolonged expiration, respiratory distress or retractions. He is not intubated.      Breath sounds: Normal breath sounds. No stridor, decreased air movement or transmitted upper airway sounds. No decreased breath sounds, wheezing, rhonchi or rales.   Abdominal:      Palpations: Abdomen is soft.      Tenderness: There is no abdominal tenderness.      Hernia: There is no hernia in the left inguinal area.   Genitourinary:     Penis: No paraphimosis, erythema, discharge or lesions.       Testes:         Left: Mass or tenderness not present.          Comments: (+) TTP of left inguinal canal, no appreciable swelling/hernia.   Musculoskeletal:         General: No swelling.      Cervical back: Neck supple.   Lymphadenopathy:      Lower Body: No left inguinal adenopathy.   Skin:     General: Skin is warm and dry.      Capillary Refill: Capillary refill takes less than 2 seconds.   Neurological:      Mental Status: He is alert.   Psychiatric:         Mood and Affect: Mood normal.         Portions of the record may have been created with voice recognition software.   "Occasional wrong word or \"sound a like\" substitutions may have occurred due to the inherent limitations of voice recognition software.  Read the chart carefully and recognize, using context, where substitutions have occurred.  "

## 2025-05-08 ENCOUNTER — TELEPHONE (OUTPATIENT)
Age: 70
End: 2025-05-08

## 2025-05-12 DIAGNOSIS — E78.5 DYSLIPIDEMIA: ICD-10-CM

## 2025-05-12 DIAGNOSIS — I10 ESSENTIAL HYPERTENSION: ICD-10-CM

## 2025-05-12 RX ORDER — ATORVASTATIN CALCIUM 40 MG/1
40 TABLET, FILM COATED ORAL DAILY
Qty: 90 TABLET | Refills: 1 | Status: SHIPPED | OUTPATIENT
Start: 2025-05-12

## 2025-05-12 RX ORDER — AMLODIPINE BESYLATE 10 MG/1
10 TABLET ORAL DAILY
Qty: 90 TABLET | Refills: 1 | Status: SHIPPED | OUTPATIENT
Start: 2025-05-12

## 2025-06-03 ENCOUNTER — APPOINTMENT (OUTPATIENT)
Dept: LAB | Age: 70
End: 2025-06-03
Attending: INTERNAL MEDICINE
Payer: MEDICARE

## 2025-06-03 DIAGNOSIS — E13.9 DIABETES 1.5, MANAGED AS TYPE 2 (HCC): ICD-10-CM

## 2025-06-03 LAB
ALBUMIN SERPL BCG-MCNC: 4.6 G/DL (ref 3.5–5)
ALP SERPL-CCNC: 131 U/L (ref 34–104)
ALT SERPL W P-5'-P-CCNC: 29 U/L (ref 7–52)
ANION GAP SERPL CALCULATED.3IONS-SCNC: 11 MMOL/L (ref 4–13)
AST SERPL W P-5'-P-CCNC: 21 U/L (ref 13–39)
BILIRUB SERPL-MCNC: 1 MG/DL (ref 0.2–1)
BUN SERPL-MCNC: 15 MG/DL (ref 5–25)
CALCIUM SERPL-MCNC: 9.8 MG/DL (ref 8.4–10.2)
CHLORIDE SERPL-SCNC: 102 MMOL/L (ref 96–108)
CHOLEST SERPL-MCNC: 131 MG/DL (ref ?–200)
CO2 SERPL-SCNC: 25 MMOL/L (ref 21–32)
CREAT SERPL-MCNC: 0.67 MG/DL (ref 0.6–1.3)
CREAT UR-MCNC: 58.4 MG/DL
EST. AVERAGE GLUCOSE BLD GHB EST-MCNC: 163 MG/DL
GFR SERPL CREATININE-BSD FRML MDRD: 98 ML/MIN/1.73SQ M
GLUCOSE P FAST SERPL-MCNC: 115 MG/DL (ref 65–99)
HBA1C MFR BLD: 7.3 %
HDLC SERPL-MCNC: 38 MG/DL
LDLC SERPL CALC-MCNC: 38 MG/DL (ref 0–100)
MICROALBUMIN UR-MCNC: 9.3 MG/L
MICROALBUMIN/CREAT 24H UR: 16 MG/G CREATININE (ref 0–30)
POTASSIUM SERPL-SCNC: 4.4 MMOL/L (ref 3.5–5.3)
PROT SERPL-MCNC: 7.3 G/DL (ref 6.4–8.4)
SODIUM SERPL-SCNC: 138 MMOL/L (ref 135–147)
TRIGL SERPL-MCNC: 274 MG/DL (ref ?–150)

## 2025-06-03 PROCEDURE — 83036 HEMOGLOBIN GLYCOSYLATED A1C: CPT

## 2025-06-03 PROCEDURE — 82043 UR ALBUMIN QUANTITATIVE: CPT

## 2025-06-03 PROCEDURE — 80061 LIPID PANEL: CPT

## 2025-06-03 PROCEDURE — 82570 ASSAY OF URINE CREATININE: CPT

## 2025-06-03 PROCEDURE — 80053 COMPREHEN METABOLIC PANEL: CPT

## 2025-06-03 PROCEDURE — 36415 COLL VENOUS BLD VENIPUNCTURE: CPT

## 2025-06-05 RX ORDER — CLOBETASOL PROPIONATE 0.5 MG/G
OINTMENT TOPICAL
COMMUNITY
Start: 2025-05-29

## 2025-06-10 ENCOUNTER — OFFICE VISIT (OUTPATIENT)
Dept: INTERNAL MEDICINE CLINIC | Facility: CLINIC | Age: 70
End: 2025-06-10
Payer: MEDICARE

## 2025-06-10 ENCOUNTER — PREP FOR PROCEDURE (OUTPATIENT)
Age: 70
End: 2025-06-10

## 2025-06-10 ENCOUNTER — TELEPHONE (OUTPATIENT)
Age: 70
End: 2025-06-10

## 2025-06-10 VITALS
OXYGEN SATURATION: 96 % | WEIGHT: 184.6 LBS | SYSTOLIC BLOOD PRESSURE: 118 MMHG | TEMPERATURE: 97 F | BODY MASS INDEX: 27.34 KG/M2 | HEIGHT: 69 IN | HEART RATE: 89 BPM | DIASTOLIC BLOOD PRESSURE: 66 MMHG

## 2025-06-10 DIAGNOSIS — J44.9 CHRONIC OBSTRUCTIVE PULMONARY DISEASE, UNSPECIFIED COPD TYPE (HCC): ICD-10-CM

## 2025-06-10 DIAGNOSIS — I63.81 STROKE, LACUNAR (HCC): ICD-10-CM

## 2025-06-10 DIAGNOSIS — Z12.11 ENCOUNTER FOR SCREENING COLONOSCOPY: ICD-10-CM

## 2025-06-10 DIAGNOSIS — Z12.11 ENCOUNTER FOR SCREENING COLONOSCOPY: Primary | ICD-10-CM

## 2025-06-10 DIAGNOSIS — E11.9 TYPE 2 DIABETES MELLITUS WITHOUT COMPLICATION, WITHOUT LONG-TERM CURRENT USE OF INSULIN (HCC): ICD-10-CM

## 2025-06-10 DIAGNOSIS — I10 PRIMARY HYPERTENSION: ICD-10-CM

## 2025-06-10 DIAGNOSIS — E66.3 OVERWEIGHT (BMI 25.0-29.9): ICD-10-CM

## 2025-06-10 DIAGNOSIS — Z72.0 TOBACCO ABUSE: ICD-10-CM

## 2025-06-10 DIAGNOSIS — E78.5 DYSLIPIDEMIA: ICD-10-CM

## 2025-06-10 DIAGNOSIS — Z23 ENCOUNTER FOR IMMUNIZATION: Primary | ICD-10-CM

## 2025-06-10 DIAGNOSIS — J30.2 SEASONAL ALLERGIES: ICD-10-CM

## 2025-06-10 DIAGNOSIS — N28.1 KIDNEY CYSTS: ICD-10-CM

## 2025-06-10 PROCEDURE — G2211 COMPLEX E/M VISIT ADD ON: HCPCS | Performed by: INTERNAL MEDICINE

## 2025-06-10 PROCEDURE — 99214 OFFICE O/P EST MOD 30 MIN: CPT | Performed by: INTERNAL MEDICINE

## 2025-06-10 RX ORDER — MOMETASONE FUROATE MONOHYDRATE 50 UG/1
2 SPRAY, METERED NASAL DAILY
Qty: 1 ACT | Refills: 2 | Status: SHIPPED | OUTPATIENT
Start: 2025-06-10

## 2025-06-10 RX ORDER — LEVOCETIRIZINE DIHYDROCHLORIDE 5 MG/1
5 TABLET, FILM COATED ORAL DAILY PRN
Qty: 30 TABLET | Refills: 2 | Status: SHIPPED | OUTPATIENT
Start: 2025-06-10

## 2025-06-10 NOTE — PROGRESS NOTES
Diabetic Foot Exam    Patient's shoes and socks removed.    Right Foot/Ankle   Right Foot Inspection  Skin Exam: skin normal and skin intact. No dry skin, no warmth, no callus, no erythema, no maceration, no abnormal color, no pre-ulcer, no ulcer and no callus.     Toe Exam: ROM and strength within normal limits.     Sensory   Monofilament testing: intact    Vascular  Capillary refills: elevated  The right DP pulse is 2+. The right PT pulse is 2+.     Left Foot/Ankle  Left Foot Inspection  Skin Exam: skin normal and skin intact. No dry skin, no warmth, no erythema, no maceration, normal color, no pre-ulcer, no ulcer and no callus.     Toe Exam: ROM and strength within normal limits.     Sensory   Monofilament testing: intact    Vascular  Capillary refills: elevated  The left PT pulse is 2+.     Assign Risk Category  No deformity present  No loss of protective sensation  No weak pulses  Risk: 0      Name: Chepe Lechuga JrDeyanira      : 1955      MRN: [id number]  Encounter Provider: Miguel Arevalo DO  Encounter Date: 6/10/2025   [facility]  :  Assessment & Plan  Encounter for immunization         Overweight (BMI 25.0-29.9)  I have counselled the pt to follow a healthy and balanced diet ,and recommend routine exercise.         Stroke, lacunar (HCC)  Clinically stable and doing well continue the current medical regiment will continue monitor.  Blood pressure controlled, on aspirin 81 mg once daily/Lipitor 40 mg once daily       Tobacco abuse  I have counseled the patient to quit smoking, I have recommended that the patient set up a quit date and I have asked the patient to cut down the cigarettes until the quit date.  Patient reports me he is down to 2 cigarettes/day is working on quitting on his own terms he did not request assistance he would like to hold off on lung cancer screening CAT scan may consider in the future       Primary hypertension  Hypertension -  controlled, I have counseled patient following healthy balance diet, I would like the patient reduce sodium, exercise routinely, I would like the patient continued the med current medical regiment and we will continue to monitor.       Dyslipidemia  Hyperlipidemia controlled continue with current medical regiment recommend a low-cholesterol diet and recommend routine exercise we will continue to monitor the progress.       Diabetes mellitus type 2  I have counselled the pt to follow a healthy and balanced diet ,and recommend routine exercise.  Declines additional medication we will continue to optimize the diet exercise  Lab Results   Component Value Date    HGBA1C 7.3 (H) 06/03/2025     Orders:  •  Hemoglobin A1C; Future  •  Comprehensive metabolic panel; Future  •  Lipid Panel with Direct LDL reflex; Future  •  Albumin / creatinine urine ratio; Future    Kidney cysts  Check ultrasound kidney and bladder  Orders:  •  US kidney and bladder; Future    Encounter for screening colonoscopy  But patient see colorectal  Orders:  •  Ambulatory Referral to Colorectal Surgery; Future    Chronic obstructive pulmonary disease, unspecified COPD type (HCC)         Seasonal allergies    Orders:  •  levocetirizine (XYZAL) 5 MG tablet; Take 1 tablet (5 mg total) by mouth daily as needed for allergies  •  mometasone (NASONEX) 50 mcg/act nasal spray; 2 sprays into each nostril daily          Depression Screening and Follow-up Plan: Patient was screened for depression during today's encounter. They screened negative with a PHQ-2 score of 0.        History of Present Illness   HPI 69-year old male coming in for a follow up visit regarding lacunar stroke, overweight, tobacco abuse, hypertension, hyperlipidemia, type 2 diabetes, kidney cyst COPD and seasonal allergies; the patient reports me compliant taking medications without untoward side effects the.  The patient is here to review his medical condition, update me on the medical  "condition and the patient reports me no hospitalizations and 1 ER visits.  1 ER visit for groin strain reviewed CAT scan no injuries no illnesses here to review laboratories in detail.  For 2 wwks ciggs per day weaning on his own terms  Review of Systems   Constitutional:  Negative for activity change, appetite change and unexpected weight change.   HENT:  Negative for congestion and postnasal drip.    Eyes:  Negative for visual disturbance.   Respiratory:  Negative for cough and shortness of breath.    Cardiovascular:  Negative for chest pain.   Gastrointestinal:  Negative for abdominal pain, diarrhea, nausea and vomiting.   Neurological:  Negative for dizziness, light-headedness and headaches.   Hematological:  Negative for adenopathy.       Objective   /66 (BP Location: Left arm, Patient Position: Sitting, Cuff Size: Standard)   Pulse 89   Temp (!) 97 °F (36.1 °C) (Tympanic)   Ht 5' 9\" (1.753 m)   Wt 83.7 kg (184 lb 9.6 oz)   SpO2 96%   BMI 27.26 kg/m²      Physical Exam  Vitals and nursing note reviewed.   Constitutional:       General: He is not in acute distress.     Appearance: Normal appearance. He is well-developed. He is not ill-appearing, toxic-appearing or diaphoretic.   HENT:      Head: Normocephalic and atraumatic.      Right Ear: External ear normal.      Left Ear: External ear normal.     Eyes:      General: No scleral icterus.        Right eye: No discharge.         Left eye: No discharge.      Conjunctiva/sclera: Conjunctivae normal.      Pupils: Pupils are equal, round, and reactive to light.       Cardiovascular:      Rate and Rhythm: Normal rate and regular rhythm.      Pulses: no weak pulses.           Dorsalis pedis pulses are 2+ on the right side.        Posterior tibial pulses are 2+ on the right side and 2+ on the left side.      Heart sounds: Normal heart sounds. No murmur heard.     No friction rub. No gallop.   Pulmonary:      Effort: No respiratory distress.      Breath " sounds: No wheezing or rales.   Abdominal:      General: Bowel sounds are normal. There is no distension.      Palpations: Abdomen is soft. There is no mass.      Tenderness: There is no abdominal tenderness. There is no guarding or rebound.     Musculoskeletal:         General: No deformity.      Cervical back: Neck supple.        Feet:    Feet:      Right foot:      Skin integrity: No ulcer, skin breakdown, erythema, warmth, callus or dry skin.      Left foot:      Skin integrity: No ulcer, skin breakdown, erythema, warmth, callus or dry skin.   Lymphadenopathy:      Cervical: No cervical adenopathy.     Neurological:      Mental Status: He is alert.     Negative edema

## 2025-06-10 NOTE — TELEPHONE ENCOUNTER
Scheduled date of colonoscopy (as of today): 07/11/25  Physician performing colonoscopy: Stefano  Location of colonoscopy: [facility]  Bowel prep reviewed with patient: JUAN CARLOS/MARIA R emailed  Instructions reviewed with patient by: BECCA  Clearances: Diabetic    : Halima Amilcarjames [phone]

## 2025-06-10 NOTE — ASSESSMENT & PLAN NOTE
Hyperlipidemia controlled continue with current medical regiment recommend a low-cholesterol diet and recommend routine exercise we will continue to monitor the progress.

## 2025-06-10 NOTE — ASSESSMENT & PLAN NOTE
Clinically stable and doing well continue the current medical regiment will continue monitor.  Blood pressure controlled, on aspirin 81 mg once daily/Lipitor 40 mg once daily

## 2025-06-10 NOTE — ASSESSMENT & PLAN NOTE
I have counselled the pt to follow a healthy and balanced diet ,and recommend routine exercise.  Declines additional medication we will continue to optimize the diet exercise  Lab Results   Component Value Date    HGBA1C 7.3 (H) 06/03/2025     Orders:  •  Hemoglobin A1C; Future  •  Comprehensive metabolic panel; Future  •  Lipid Panel with Direct LDL reflex; Future  •  Albumin / creatinine urine ratio; Future

## 2025-06-10 NOTE — TELEPHONE ENCOUNTER
06/10/25  Screened by: Jessica Martinez    Referring Provider over due 10 yrs    Pre- Screening:     There is no height or weight on file to calculate BMI.  Has patient been referred for a routine screening Colonoscopy? yes  Is the patient between 45-75 years old? yes      Previous Colonoscopy yes   If yes:    Date: 2014    Facility:     Reason:           Does the patient want to see a Gastroenterologist prior to their procedure OR are they having any GI symptoms? no    Has the patient been hospitalized or had abdominal surgery in the past 6 months? no    Does the patient use supplemental oxygen? no    Does the patient take Coumadin, Lovenox, Plavix, Elliquis, Xarelto, or other blood thinning medication? no    Has the patient had a stroke, cardiac event, or stent placed in the past year? no      If patient is between 45yrs - 49yrs, please advise patient that we will have to confirm benefits & coverage with their insurance company for a routine screening colonoscopy.

## 2025-06-10 NOTE — ASSESSMENT & PLAN NOTE
I have counseled the patient to quit smoking, I have recommended that the patient set up a quit date and I have asked the patient to cut down the cigarettes until the quit date.  Patient reports me he is down to 2 cigarettes/day is working on quitting on his own terms he did not request assistance he would like to hold off on lung cancer screening CAT scan may consider in the future

## 2025-06-12 DIAGNOSIS — E13.9 DIABETES 1.5, MANAGED AS TYPE 2 (HCC): ICD-10-CM

## 2025-06-16 ENCOUNTER — HOSPITAL ENCOUNTER (OUTPATIENT)
Dept: RADIOLOGY | Facility: IMAGING CENTER | Age: 70
Discharge: HOME | End: 2025-06-16
Attending: INTERNAL MEDICINE
Payer: MEDICARE

## 2025-06-16 ENCOUNTER — TELEPHONE (OUTPATIENT)
Age: 70
End: 2025-06-16

## 2025-06-16 DIAGNOSIS — N28.1 KIDNEY CYSTS: ICD-10-CM

## 2025-06-16 PROCEDURE — 76775 US EXAM ABDO BACK WALL LIM: CPT

## 2025-06-16 NOTE — TELEPHONE ENCOUNTER
Pt stated that his left elbow hurts in the inner part and it started over 1 month ago. Pt was just in office for an appt on 6/10/25 and wants to know what he should do next. Please contact pt and advise. Thank you for your help.

## 2025-06-17 ENCOUNTER — TELEPHONE (OUTPATIENT)
Dept: URGENT CARE | Age: 70
End: 2025-06-17

## 2025-06-17 ENCOUNTER — APPOINTMENT (OUTPATIENT)
Dept: RADIOLOGY | Age: 70
End: 2025-06-17
Attending: NURSE PRACTITIONER
Payer: MEDICARE

## 2025-06-17 ENCOUNTER — OFFICE VISIT (OUTPATIENT)
Dept: URGENT CARE | Age: 70
End: 2025-06-17
Payer: MEDICARE

## 2025-06-17 VITALS
HEART RATE: 80 BPM | TEMPERATURE: 98.6 F | DIASTOLIC BLOOD PRESSURE: 78 MMHG | BODY MASS INDEX: 27.17 KG/M2 | OXYGEN SATURATION: 98 % | WEIGHT: 184 LBS | SYSTOLIC BLOOD PRESSURE: 116 MMHG | RESPIRATION RATE: 16 BRPM

## 2025-06-17 DIAGNOSIS — M25.522 LEFT ELBOW PAIN: Primary | ICD-10-CM

## 2025-06-17 DIAGNOSIS — M25.522 LEFT ELBOW PAIN: ICD-10-CM

## 2025-06-17 DIAGNOSIS — M77.8 LEFT ELBOW TENDINITIS: ICD-10-CM

## 2025-06-17 PROCEDURE — 99213 OFFICE O/P EST LOW 20 MIN: CPT | Performed by: NURSE PRACTITIONER

## 2025-06-17 PROCEDURE — 73080 X-RAY EXAM OF ELBOW: CPT

## 2025-06-17 PROCEDURE — G0463 HOSPITAL OUTPT CLINIC VISIT: HCPCS | Performed by: NURSE PRACTITIONER

## 2025-06-17 NOTE — TELEPHONE ENCOUNTER
Chepe called to notify him of the radiology findings from his left elbow xray done in urgent care today.   Results showed calcific tendinopathy adjacent to the lateral epicondyle and olecranon spurring.  Offered to place and orthopedic referral but he said he will follow up with his PCP next week.

## 2025-06-17 NOTE — PROGRESS NOTES
Name: Chepe Lechuga Jr.      : 1955      MRN: [id number]  Encounter Provider: RUSS Olivier  Encounter Date: 2025   Encounter department: [facility]  :  Assessment & Plan  Left elbow pain    Orders:    XR elbow 3+ vw left; Future    Left elbow tendinitis  Initial imaging negative for acute pathology, will await official read by radiology and notify you if there is a change in the plan of care.  Suspect left elbow tendinitis from repeated use.  Recommend ibuprofen as needed for pain  Apply ice as tolerated for 20 minutes at a time 3-4 times per day as needed for swelling.    After 48 hours apply heat for 20 minutes at a time 3-4 times per day as needed for swelling.  Recommend ibuprofen as needed for pain    Follow up with PCP next week for further evaluation             History of Present Illness   HPI  Chepe Lechuga Jr. is a 69 y.o. male who presents for evaluation of left elbow pain which has been occurring on and off for several months now.  Patient states it seems to increase with repetitive use, and especially during both season.  He has been taking Tylenol arthritis at home for the pain which helps somewhat but does not completely take it away.  He denies any specific trauma or injury.  He denies numbness, tingling, decreased sensation, or color change to his left arm.      Review of Systems   Constitutional:  Negative for chills, fatigue and fever.   Musculoskeletal:  Positive for arthralgias and joint swelling.        Left elbow   Skin:  Negative for color change.   Neurological:  Negative for dizziness, tremors, weakness, light-headedness, numbness and headaches.   All other systems reviewed and are negative.         Objective   /78 (BP Location: Left arm, Patient Position: Sitting, Cuff Size: Adult)   Pulse 80   Temp 98.6 °F (37 °C) (Tympanic)   Resp 16   Wt 83.5 kg (184 lb)   SpO2 98%   BMI 27.17 kg/m²      Physical Exam  Vitals and nursing note  reviewed.   Constitutional:       Appearance: Normal appearance.   HENT:      Head: Normocephalic and atraumatic.     Musculoskeletal:         General: Swelling and tenderness present. No signs of injury.      Left elbow: Swelling present. No deformity, effusion or lacerations. Normal range of motion. Tenderness present.        Arms:      Skin:     General: Skin is warm and dry.     Neurological:      Mental Status: He is alert.

## 2025-06-17 NOTE — PROGRESS NOTES
Name: Chepe Lechuga Jr.      : 1955      MRN: [id number]  Encounter Provider: RUSS Olivier  Encounter Date: 2025   Encounter department: [facility]  :  Assessment & Plan  Left elbow pain  Initial imaging negative for acute pathology, will await official read by radiology and notify you if there is a change in the plan of care.  Recommend ibuprofen as needed for pain  Apply ice as tolerated for 20 minutes at a time 3-4 times per day as needed for swelling.    After 48 hours apply heat for 20 minutes at a time 3-4 times per day as needed for swelling.  Recommend ibuprofen as needed for pain    Follow up with PCP next week for further evaluation    Follow up with PCP in 3-5 days.  Proceed to  ER if symptoms worsen.     If tests are performed, our office will contact you with results only if changes need to made to the care plan discussed with you at the visit. You can review your full results on [facility].    Orders:    XR elbow 3+ vw left; Future        History of Present Illness   HPI  Chepe Lechuga Jr. is a 69 y.o. male who presents for evaluation of left elbow pain.  Pt reports this pain has been on and off for a couple of months.  He states it flares up during bow season and with repeated use.  He called his PCP who can't get him in to be seen until next week.  He has been taking tylenol for the pain and applying heat to the area.  Pain is a 2/10 on the pain scale currently.        Review of Systems   Constitutional:  Negative for activity change, chills, fatigue and fever.   Respiratory:  Negative for chest tightness and shortness of breath.    Cardiovascular:  Negative for chest pain.   Gastrointestinal:  Negative for abdominal pain.   Musculoskeletal:  Positive for arthralgias and joint swelling.        Left elbow   Skin:  Negative for color change and wound.   All other systems reviewed and are negative.         Objective   /78 (BP Location: Left  arm, Patient Position: Sitting, Cuff Size: Adult)   Pulse 80   Temp 98.6 °F (37 °C) (Tympanic)   Resp 16   Wt 83.5 kg (184 lb)   SpO2 98%   BMI 27.17 kg/m²      Physical Exam  Vitals and nursing note reviewed.   Constitutional:       Appearance: Normal appearance.   HENT:      Mouth/Throat:      Mouth: Mucous membranes are moist.      Pharynx: Oropharynx is clear.     Musculoskeletal:         General: Swelling and tenderness present. No signs of injury.        Arms:       Comments: Left medial elbow     Skin:     General: Skin is warm and dry.     Neurological:      Mental Status: He is alert.

## 2025-06-17 NOTE — PATIENT INSTRUCTIONS
Initial imaging negative for acute pathology, will await official read by radiology and notify you if there is a change in the plan of care.  Recommend ibuprofen as needed for pain  Apply ice as tolerated for 20 minutes at a time 3-4 times per day as needed for swelling.    After 48 hours apply heat for 20 minutes at a time 3-4 times per day as needed for swelling.  Recommend ibuprofen as needed for pain    Follow up with PCP next week for further evaluation    Follow up with PCP in 3-5 days.  Proceed to  ER if symptoms worsen.     If tests are performed, our office will contact you with results only if changes need to made to the care plan discussed with you at the visit. You can review your full results on [facility].

## 2025-06-23 ENCOUNTER — OFFICE VISIT (OUTPATIENT)
Dept: INTERNAL MEDICINE CLINIC | Facility: CLINIC | Age: 70
End: 2025-06-23
Payer: MEDICARE

## 2025-06-23 VITALS
OXYGEN SATURATION: 97 % | SYSTOLIC BLOOD PRESSURE: 116 MMHG | TEMPERATURE: 97.8 F | HEART RATE: 87 BPM | BODY MASS INDEX: 27.5 KG/M2 | WEIGHT: 186.2 LBS | DIASTOLIC BLOOD PRESSURE: 76 MMHG

## 2025-06-23 DIAGNOSIS — Z23 ENCOUNTER FOR IMMUNIZATION: Primary | ICD-10-CM

## 2025-06-23 DIAGNOSIS — M25.522 LEFT ELBOW PAIN: ICD-10-CM

## 2025-06-23 PROBLEM — M25.529 ELBOW PAIN: Status: ACTIVE | Noted: 2025-06-23

## 2025-06-23 PROCEDURE — G2211 COMPLEX E/M VISIT ADD ON: HCPCS

## 2025-06-23 PROCEDURE — 99213 OFFICE O/P EST LOW 20 MIN: CPT

## 2025-06-23 NOTE — ASSESSMENT & PLAN NOTE
Patient presents to the clinic today due to ongoing left elbow pain with activity.  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  He reports the pain started about a few weeks ago with activity, resolves with rest  The clinic today is range of motion was intact, not having any pain during my examination and no point tenderness.  On 6/17/2025 x-ray was done: No acute osseous abnormality.Evidence of calcific tendinopathy adjacent to the lateral epicondyle. Olecranon spurring is also identified.  Advised the patient to limit his activity/repetitive motions, continue icing, NSAIDs/Tylenol and using an elbow brace.  Patient agreed and understood.   If continues to have worsening pain would consider orthopedics referral

## 2025-06-23 NOTE — PROGRESS NOTES
Name: Chepe Lechuga Jr.      : 1955      MRN: [id number]  Encounter Provider: Alfredito Joy MD  Encounter Date: 2025   [facility]: [facility]  :  Assessment & Plan  Left elbow pain  Patient presents to the clinic today due to ongoing left elbow pain with activity.  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  He reports the pain started about a few weeks ago with activity, resolves with rest  The clinic today is range of motion was intact, not having any pain during my examination and no point tenderness.  On 2025 x-ray was done: No acute osseous abnormality.Evidence of calcific tendinopathy adjacent to the lateral epicondyle. Olecranon spurring is also identified.  Advised the patient to limit his activity/repetitive motions, continue icing, NSAIDs/Tylenol and using an elbow brace.  Patient agreed and understood.   If continues to have worsening pain would consider orthopedics referral              History of Present Illness   HPI  Patient presents to the clinic today to follow-up regarding his left elbow pain with activity.  Currently denies having any pain at rest in the clinic, denies having any fever, chills, nausea/vomiting and patient's range of motion is intact  Review of Systems   Constitutional:  Negative for chills and fever.   HENT:  Negative for ear pain and sore throat.    Eyes:  Negative for pain and visual disturbance.   Respiratory:  Negative for cough and shortness of breath.    Cardiovascular:  Negative for chest pain and palpitations.   Gastrointestinal:  Negative for abdominal pain and vomiting.   Genitourinary:  Negative for dysuria and hematuria.   Musculoskeletal:  Positive for arthralgias. Negative for back pain.   Skin:  Negative for color change and rash.   Neurological:  Negative for seizures and syncope.   All other systems reviewed and are negative.      Objective   /76 (BP Location: Left arm, Patient Position:  Sitting, Cuff Size: Standard)   Pulse 87   Temp 97.8 °F (36.6 °C) (Tympanic)   Wt 84.5 kg (186 lb 3.2 oz)   SpO2 97%   BMI 27.50 kg/m²      Physical Exam  Vitals and nursing note reviewed.   Constitutional:       General: He is not in acute distress.     Appearance: He is well-developed.   HENT:      Head: Normocephalic and atraumatic.     Eyes:      Conjunctiva/sclera: Conjunctivae normal.       Cardiovascular:      Rate and Rhythm: Normal rate and regular rhythm.      Heart sounds: No murmur heard.  Pulmonary:      Effort: Pulmonary effort is normal. No respiratory distress.      Breath sounds: Normal breath sounds.   Abdominal:      Palpations: Abdomen is soft.      Tenderness: There is no abdominal tenderness.     Musculoskeletal:         General: No swelling.      Cervical back: Neck supple.     Skin:     General: Skin is warm and dry.      Capillary Refill: Capillary refill takes less than 2 seconds.     Neurological:      Mental Status: He is alert.     Psychiatric:         Mood and Affect: Mood normal.

## 2025-06-30 NOTE — ASSESSMENT & PLAN NOTE
Patient with a fluctuating elevated PSA for several years.  Denies strong family history of prostate cancer.    CT scan performed in May 2025 did demonstrate a significantly enlarged prostate.  We did discuss that this could be the driving factor of his elevated PSA, although this cannot be confirmed.  We discussed that the only definitive way to rule in/rule out prostate cancer is with a prostate biopsy.    The patient and I had a discussion about PSA testing and the risk stratification associated with diagnosing prostate cancer. I described the normal function of the PSA in the reproductive process and how this level can be detected in the blood. We discussed the controversial history of PSA screening for prostate cancer in the [place] as well as the risk of over detection and over treatment of prostate cancer by way of PSA screening. The patient understands that PSA blood testing is an imperfect way to screen for prostate cancer and that elevated PSA levels in the blood may also be caused by infection, inflammation, prostatic trauma or manipulation, urological procedures, or by benign prostatic enlargement.     We discussed proceeding with a multiparametric MRI of the prostate.  The patient reports that he would not be able to tolerate the MRI due to severe claustrophobia.  I did offer oral sedation, such as Xanax, but he states that he still would not be able to tolerate and would need to be completely sedated.  Due to this he is not interested in proceeding with an MRI at this time.    We discussed proceeding with a random prostate biopsy.  He is still hesitant to proceed with this due to knowing several people who had issues with infections following prostate biopsy.  We did discuss that we use a transperineal approach which decreases the risk of infection.  He still wishes to think about this and will call the office if he is interested in proceeding with prostate biopsy.    We will plan to tentatively  follow-up in 6 months in case he decides not to proceed with prostate biopsy.  Will plan to recheck a PSA at that time.    Lab Results   Component Value Date    PSA 5.037 (H) 11/21/2024    PSA 5.0 (H) 05/15/2023    PSA 4.7 (H) 10/10/2022      Orders:    PSA Total, Diagnostic; Future

## 2025-06-30 NOTE — PROGRESS NOTES
Name: Chepe Lechuga Jr.      : 1955      MRN: [id number]  Encounter Provider: RUSS Longoria  Encounter Date: 2025   Encounter department: [facility] [place]  :  Assessment & Plan  Elevated PSA  Patient with a fluctuating elevated PSA for several years.  Denies strong family history of prostate cancer.    CT scan performed in May 2025 did demonstrate a significantly enlarged prostate.  We did discuss that this could be the driving factor of his elevated PSA, although this cannot be confirmed.  We discussed that the only definitive way to rule in/rule out prostate cancer is with a prostate biopsy.    The patient and I had a discussion about PSA testing and the risk stratification associated with diagnosing prostate cancer. I described the normal function of the PSA in the reproductive process and how this level can be detected in the blood. We discussed the controversial history of PSA screening for prostate cancer in the [place] as well as the risk of over detection and over treatment of prostate cancer by way of PSA screening. The patient understands that PSA blood testing is an imperfect way to screen for prostate cancer and that elevated PSA levels in the blood may also be caused by infection, inflammation, prostatic trauma or manipulation, urological procedures, or by benign prostatic enlargement.     We discussed proceeding with a multiparametric MRI of the prostate.  The patient reports that he would not be able to tolerate the MRI due to severe claustrophobia.  I did offer oral sedation, such as Xanax, but he states that he still would not be able to tolerate and would need to be completely sedated.  Due to this he is not interested in proceeding with an MRI at this time.    We discussed proceeding with a random prostate biopsy.  He is still hesitant to proceed with this due to knowing several people who had issues with infections following prostate biopsy.   We did discuss that we use a transperineal approach which decreases the risk of infection.  He still wishes to think about this and will call the office if he is interested in proceeding with prostate biopsy.    We will plan to tentatively follow-up in 6 months in case he decides not to proceed with prostate biopsy.  Will plan to recheck a PSA at that time.    Lab Results   Component Value Date    PSA 5.037 (H) 11/21/2024    PSA 5.0 (H) 05/15/2023    PSA 4.7 (H) 10/10/2022      Orders:    PSA Total, Diagnostic; Future        History of Present Illness   Chepe Lechuga Jr. is a 69 y.o. male who presents today to the office for further evaluation of an elevated PSA.  He was last seen in 2022.    Today in the office he states that he is overall doing well.  He denies any urinary/urological complaints.    He reports that he would not be able to tolerate an MRI.  He states that he has severe claustrophobia.  He states that he had an MRI of his brain several years ago and ever since that time he has not been able to have any additional MRIs.  He states that he would need to be completely sedated to have an MRI.  He states that even if he took a Xanax he still would not be able to tolerate it.    He states that he is hesitant to move forward with a prostate biopsy due to knowing 3 people who had infections following biopsy.  He states that 3 of his friends had rectal biopsies and ended up with severe infections.  Due to this he is very hesitant to move forward.    He denies any strong family history of prostate cancer.    Review of Systems   Constitutional:  Negative for chills and fever.   Respiratory: Negative.  Negative for cough and shortness of breath.    Cardiovascular: Negative.  Negative for chest pain.   Gastrointestinal: Negative.  Negative for abdominal distention, abdominal pain, nausea and vomiting.   Genitourinary:  Negative for decreased urine volume, difficulty urinating, dysuria, flank pain, frequency,  "hematuria, penile discharge, penile pain, penile swelling, scrotal swelling, testicular pain and urgency.   Skin: Negative.  Negative for rash.   Neurological: Negative.    Hematological:  Negative for adenopathy. Does not bruise/bleed easily.          Objective   /76 (BP Location: Right arm, Patient Position: Sitting, Cuff Size: Adult)   Pulse 82   Ht 5' 9\" (1.753 m) Comment: verbal  Wt 83 kg (183 lb) Comment: with clothes and shoes  SpO2 95%   BMI 27.02 kg/m²     Physical Exam  Vitals reviewed.   Constitutional:       Appearance: Normal appearance.   HENT:      Head: Normocephalic and atraumatic.     Eyes:      Pupils: Pupils are equal, round, and reactive to light.       Cardiovascular:      Rate and Rhythm: Normal rate.   Pulmonary:      Effort: Pulmonary effort is normal.   Abdominal:      General: Abdomen is flat.      Palpations: Abdomen is soft.     Musculoskeletal:      Cervical back: Normal range of motion.     Skin:     General: Skin is warm and dry.     Neurological:      General: No focal deficit present.      Mental Status: He is alert and oriented to person, place, and time.     Psychiatric:         Mood and Affect: Mood normal.         Behavior: Behavior normal.         Thought Content: Thought content normal.         Judgment: Judgment normal.           Results   Lab Results   Component Value Date    PSA 5.037 (H) 11/21/2024    PSA 5.0 (H) 05/15/2023    PSA 4.7 (H) 10/10/2022     Lab Results   Component Value Date    CALCIUM 9.8 06/03/2025    K 4.4 06/03/2025    CO2 25 06/03/2025     06/03/2025    BUN 15 06/03/2025    CREATININE 0.67 06/03/2025     Lab Results   Component Value Date    WBC 12.69 (H) 05/06/2025    HGB 16.2 05/06/2025    HCT 48.2 05/06/2025    MCV 88 05/06/2025     05/06/2025       Office Urine Dip  No results found for this or any previous visit (from the past hour).        "

## 2025-07-03 ENCOUNTER — NURSE TRIAGE (OUTPATIENT)
Dept: OTHER | Facility: OTHER | Age: 70
End: 2025-07-03

## 2025-07-04 DIAGNOSIS — J30.2 SEASONAL ALLERGIES: ICD-10-CM

## 2025-07-04 NOTE — TELEPHONE ENCOUNTER
"Regarding: requesting colonoscopy prep instructions  ----- Message from Amie AMIN sent at 7/3/2025  7:24 PM EDT -----  Correction: email is [email or web address]    ----- Message from Couple sent at 7/3/2025  7:23 PM EDT -----  Patient stated, \"I'm having a colonoscopy and the instructions were supposed to be sent to my wife's email [email or web address] because I don't have [email or web address]. We did not receive it. Can it get sent to us?\"    "

## 2025-07-04 NOTE — TELEPHONE ENCOUNTER
Copied from prior message     albuterol (PROVENTIL HFA,VENTOLIN HFA) 90 mcg/act inhaler Uses PRN- OK to take day of surgery    amLODIPine (NORVASC) 10 mg tablet Take day of surgery.    aspirin 81 mg chewable tablet Take day of surgery.    atorvastatin (LIPITOR) 40 mg tablet Take day of surgery.    cetirizine (ZyrTEC) 10 mg tablet Take day of surgery.    hydroCHLOROthiazide 12.5 mg capsule Take day of surgery.    levocetirizine (XYZAL) 5 MG tablet Take night before surgery    lisinopril (ZESTRIL) 40 mg tablet Take day of surgery.    metFORMIN (GLUCOPHAGE) 1000 MG tablet Hold day of surgery.                Medication instructions reviewed. Please use only a sip of water to take your instructed medications 2 hours prior to arrival. Avoid Iron 1 week prior to your colonoscopy. If you use rescue inhalers, please bring on day of procedure. Hold All vitamins/supplements day of procedure.     You will receive a call one business day prior to your procedure with an arrival time and hospital directions. If your procedure is scheduled on a Monday, the hospital will be calling you on the Friday prior to your procedure.  If you have not heard from anyone by 8pm, please call the GI office # [phone] .([place] [phone]).     Follow bowel prep instructions exactly as written to ensure effectiveness.     Colonoscopy Preparation:  DULCOLAX & MIRALAX       The OR/GI Lab will contact you the evening prior to your procedure with your exact arrival time.      We kindly ask that you immediately notify us of any need to cancel or reschedule your procedure.        WEEK BEFORE THE PROCEDURE:   Do not take Iron tablets for one week.   5 days prior to the appointment AVOID vegetables and fruits with skins or seeds, nuts, corn, popcorn, and whole grain breads.    Purchase: One (1) 238-gram container of Miralax (polyethylene glycol 3350), four (4) 5 mg Dulcolax (bisacodyl) tablets, and 64-ounces of Gatorade (sports drink) - no red,  orange, or purple. These may be purchased at any pharmacy without a prescription. Generic products are permissible.    Arrange responsible transportation for day of the procedure.       DAY BEFORE THE PROCEDURE:    CLEAR liquids only for entire day prior. Nothing red, orange or purple.   You MAY have:   ?   ?   ?  Soda   Water   Broth  ?   ?   ?    Gatorade   Jell-O   Popsicles    Coffee/tea without milk/creamer       YOU MAY NOT HAVE:   o Solid foods    o Milk and milk products            o Juice with pulp      BOWEL PREPARATION:  Includes: One (1) 238-gram container of Miralax (polyethylene glycol 3350), four (4) 5 mg Dulcolax (bisacodyl) tablets, and 64-ounces of Gatorade (sports drink).  Preparation may be refrigerated.  Entire bowel prep should be completed.       Afternoon before the procedure (2:00 pm - 5:00 pm):     Take two (2) 5 mg Dulcolax laxative tablets.       Evening before the procedure (6:00 pm):   Mix entire container of Miralax with 64-ounces of Gatorade and shake until all medication is dissolved.    Begin drinking solution. Drink an eight (8) ounce cup every 10-15 minutes until you have consumed half (32 ounces) of the solution.  Refrigerate remaining solution.      Night before the procedure (8:00 pm):   Take two (2) 5 mg Dulcolax laxative tablets.      Beginning 5 hours before your procedure:   Drink the remaining amount of prepared solution (32 ounces).  Drink an eight (8) ounce cup every 10-15 minutes until you have consumed the remaining solution.    Bowel prep should be completed 4 hours prior to procedure time.      NOTHING TO EAT OR DRINK AFTER MIDNIGHT -EXCEPT YOUR BOWEL PREP      DAY OF THE PROCEDURE:   You may brush your teeth.   Leave all jewelry at home.  Take out any facial piercings, if able.    Please arrive for your procedure as indicated by the OR / GI Lab / Endoscopy Unit. The hospital will contact you the day before with your exact arrival time.    Make sure you have arranged  ahead of time for a responsible adult (18 or older) to accompany and drive you home after the procedure.  Please discuss any transportation concerns with our staff prior to your procedure.     The effects of the anesthesia can persist for 24 hours.  After receiving the sedation, you must exercise caution before engaging in any activity that could harm yourself and others (such as driving a car).  Do not make any important decisions or do not drink any alcoholic beverages during this time period.   After your procedure, you may have anything you'd like to eat or drink.  You will probably want to start with something light.  Please include plenty of fluids.  Avoid items that cause gas such as sodas and salads.      NOTHING TO EAT OR DRINK (INCLUDING CHEWING GUM) AFTER 12 MIDNIGHT PRIOR TO YOUR PROCEDURE EXCEPT YOUR BOWEL PREP.      Arrange for a responsible person over the age of 18 to drive you to and from the hospital on the day of your procedure. Please confirm the visitor policy for the day of your procedure when you receive your phone call with an arrival time.      Finally, call the GI office at [phone] with any new illnesses, exposures, or additional questions prior to your procedure. If you need to reschedule for a non medical reason, please do so NO LATER than 3 days prior to your procedure.

## 2025-07-04 NOTE — TELEPHONE ENCOUNTER
"REASON FOR CONVERSATION: No Triage Call    SYMPTOMS: Colonoscopy prep instructions request   Proxy granted from patient to be given to spouse Halima Lechuga. Patient states we can send information to her my chart.    OTHER HEALTH INFORMATION: None    PROTOCOL DISPOSITION: Home Care (Information or Advice Only Call)    CARE ADVICE PROVIDED:  Colonoscopy prep     PRACTICE FOLLOW-UP: PCP      Reason for Disposition   Health Information question, no triage required and triager able to answer question    Answer Assessment - Initial Assessment Questions  1. REASON FOR CALL or QUESTION: \"What is your reason for calling today?\" or \"How can I best help you?\" or \"What question do you have that I can help answer?\"      Colonoscopy prep instructions request    Proxy granted from patient to be given to spouse Halima Lechuga. Patient states we can send information to her my chart.    Protocols used: Information Only Call-ADULT-    "

## 2025-07-06 RX ORDER — MOMETASONE FUROATE MONOHYDRATE 50 UG/1
SPRAY, METERED NASAL
Qty: 51 G | Refills: 0 | Status: SHIPPED | OUTPATIENT
Start: 2025-07-06

## 2025-07-06 RX ORDER — LEVOCETIRIZINE DIHYDROCHLORIDE 5 MG/1
TABLET, FILM COATED ORAL
Qty: 90 TABLET | Refills: 0 | Status: SHIPPED | OUTPATIENT
Start: 2025-07-06

## 2025-07-08 ENCOUNTER — OFFICE VISIT (OUTPATIENT)
Dept: UROLOGY | Facility: AMBULATORY SURGERY CENTER | Age: 70
End: 2025-07-08
Payer: MEDICARE

## 2025-07-08 VITALS
SYSTOLIC BLOOD PRESSURE: 128 MMHG | HEIGHT: 69 IN | DIASTOLIC BLOOD PRESSURE: 76 MMHG | HEART RATE: 82 BPM | WEIGHT: 183 LBS | OXYGEN SATURATION: 95 % | BODY MASS INDEX: 27.11 KG/M2

## 2025-07-08 DIAGNOSIS — R97.20 ELEVATED PSA: Primary | ICD-10-CM

## 2025-07-08 PROCEDURE — 99203 OFFICE O/P NEW LOW 30 MIN: CPT

## 2025-07-09 NOTE — TELEPHONE ENCOUNTER
There is  message in his chart sent on 7/2 with the instructions. If [facility], best to call the colorectal office to  a copy of the instructions.

## 2025-07-11 ENCOUNTER — ANESTHESIA EVENT (OUTPATIENT)
Dept: GASTROENTEROLOGY | Facility: HOSPITAL | Age: 70
End: 2025-07-11
Payer: MEDICARE

## 2025-07-11 ENCOUNTER — ANESTHESIA (OUTPATIENT)
Dept: GASTROENTEROLOGY | Facility: HOSPITAL | Age: 70
End: 2025-07-11
Payer: MEDICARE

## 2025-07-11 ENCOUNTER — HOSPITAL ENCOUNTER (OUTPATIENT)
Dept: GASTROENTEROLOGY | Facility: HOSPITAL | Age: 70
Setting detail: OUTPATIENT SURGERY
End: 2025-07-11
Attending: COLON & RECTAL SURGERY
Payer: MEDICARE

## 2025-07-11 ENCOUNTER — NURSE TRIAGE (OUTPATIENT)
Dept: OTHER | Facility: OTHER | Age: 70
End: 2025-07-11

## 2025-07-11 VITALS
OXYGEN SATURATION: 94 % | TEMPERATURE: 96.5 F | SYSTOLIC BLOOD PRESSURE: 126 MMHG | DIASTOLIC BLOOD PRESSURE: 60 MMHG | HEART RATE: 85 BPM | RESPIRATION RATE: 16 BRPM

## 2025-07-11 DIAGNOSIS — Z12.11 ENCOUNTER FOR SCREENING COLONOSCOPY: ICD-10-CM

## 2025-07-11 LAB — GLUCOSE SERPL-MCNC: 132 MG/DL (ref 65–140)

## 2025-07-11 PROCEDURE — 88305 TISSUE EXAM BY PATHOLOGIST: CPT | Performed by: PATHOLOGY

## 2025-07-11 PROCEDURE — 82948 REAGENT STRIP/BLOOD GLUCOSE: CPT

## 2025-07-11 PROCEDURE — 45385 COLONOSCOPY W/LESION REMOVAL: CPT | Performed by: COLON & RECTAL SURGERY

## 2025-07-11 RX ORDER — EPHEDRINE SULFATE 50 MG/ML
INJECTION INTRAVENOUS AS NEEDED
Status: DISCONTINUED | OUTPATIENT
Start: 2025-07-11 | End: 2025-07-11

## 2025-07-11 RX ORDER — PROPOFOL 10 MG/ML
INJECTION, EMULSION INTRAVENOUS AS NEEDED
Status: DISCONTINUED | OUTPATIENT
Start: 2025-07-11 | End: 2025-07-11

## 2025-07-11 RX ORDER — SODIUM CHLORIDE 9 MG/ML
INJECTION, SOLUTION INTRAVENOUS CONTINUOUS PRN
Status: DISCONTINUED | OUTPATIENT
Start: 2025-07-11 | End: 2025-07-11

## 2025-07-11 RX ORDER — PROPOFOL 10 MG/ML
INJECTION, EMULSION INTRAVENOUS CONTINUOUS PRN
Status: DISCONTINUED | OUTPATIENT
Start: 2025-07-11 | End: 2025-07-11

## 2025-07-11 RX ORDER — LIDOCAINE HYDROCHLORIDE 10 MG/ML
INJECTION, SOLUTION EPIDURAL; INFILTRATION; INTRACAUDAL; PERINEURAL AS NEEDED
Status: DISCONTINUED | OUTPATIENT
Start: 2025-07-11 | End: 2025-07-11

## 2025-07-11 RX ADMIN — PROPOFOL 120 MCG/KG/MIN: 10 INJECTION, EMULSION INTRAVENOUS at 07:28

## 2025-07-11 RX ADMIN — LIDOCAINE HYDROCHLORIDE 50 MG: 10 INJECTION, SOLUTION EPIDURAL; INFILTRATION; INTRACAUDAL at 07:28

## 2025-07-11 RX ADMIN — Medication 40 MG: at 07:36

## 2025-07-11 RX ADMIN — EPHEDRINE SULFATE 5 MG: 50 INJECTION, SOLUTION INTRAVENOUS at 07:36

## 2025-07-11 RX ADMIN — PROPOFOL 120 MG: 10 INJECTION, EMULSION INTRAVENOUS at 07:28

## 2025-07-11 RX ADMIN — SODIUM CHLORIDE: 0.9 INJECTION, SOLUTION INTRAVENOUS at 07:18

## 2025-07-11 NOTE — ANESTHESIA POSTPROCEDURE EVALUATION
Post-Op Assessment Note    CV Status:  Stable  Pain Score: 0    Pain management: adequate       Mental Status:  Alert and awake   Hydration Status:  Euvolemic   PONV Controlled:  Controlled   Airway Patency:  Patent     Post Op Vitals Reviewed: Yes    No anethesia notable event occurred.    Staff: CRNA           Last Filed PACU Vitals:  Vitals Value Taken Time   Temp 96.5 °F (35.8 °C) 07/11/25 07:57   Pulse 85 07/11/25 07:57   BP 99/58 07/11/25 07:57   Resp 16 07/11/25 07:57   SpO2 95 % 07/11/25 07:57       Modified Filiberto:     Vitals Value Taken Time   Activity 2 07/11/25 08:00   Respiration 2 07/11/25 08:00   Circulation 2 07/11/25 08:00   Consciousness 1 07/11/25 08:00   Oxygen Saturation 1 07/11/25 08:00     Modified Filiberto Score: 8

## 2025-07-11 NOTE — H&P
History and Physical   Colon and Rectal Surgery   Chepe Lechuga Jr. 69 y.o. male MRN: [id number]  Unit/Bed#:  Encounter: [id number]  07/11/25   7:18 AM      CC:  Screening colon    History of Present Illness   HPI:  Chepe Lechuga Jr. is a 69 y.o. male with no GI symptoms.  Historical Information   Past Medical History[1]  Past Surgical History[2]    Meds/Allergies     Not in a hospital admission.    Current Medications[3]    Allergies[4]      Social History   Social History     Substance and Sexual Activity   Alcohol Use Not Currently     Social History     Substance and Sexual Activity   Drug Use No     Tobacco Use History[5]      Family History: Family History[6]      Objective     Current Vitals:   Blood Pressure: 165/75 (07/11/25 0703)  Pulse: 89 (07/11/25 0703)  Temperature: (!) 97 °F (36.1 °C) (07/11/25 0703)  Temp Source: Tympanic (07/11/25 0703)  Respirations: 18 (07/11/25 0703)  SpO2: 96 % (07/11/25 0703)  No intake or output data in the 24 hours ending 07/11/25 0718    Physical Exam:  General:  Well nourished, no distress.  Neuro: Alert and oriented  Eyes:Sclera anicteric, conjunctiva pink.  Pulm: Clear to auscultation bilaterally. No respiratory Distress.   CV:  Regular rate and rhythm. No murmurs.  Abdomen:  Soft, flat, non-tender, without masses or hepatosplenomegaly.    Lab Results:       ASSESSMENT:  Chepe Lechuga Jr. is a 69 y.o. male for screening.  PLAN:  Colonoscopy.  Risks , including, but not limited to, bleeding, perforation, missed lesions, and potential need for surgery, were reviewed. Alternatives to colonoscopy were discussed.  BALBINA Agarwal MD       [1]   Past Medical History:  Diagnosis Date    Adenocarcinoma of prostate (HCC)     [id number]  LAST ASSESSED    Diabetes mellitus (HCC)     Hypertension    [2]   Past Surgical History:  Procedure Laterality Date    COLON SURGERY      HERNIA REPAIR      SHOULDER SURGERY      TONSILLECTOMY      TONSILLECTOMY AND ADENOIDECTOMY      [3]   Current Outpatient Medications:     amLODIPine (NORVASC) 10 mg tablet, TAKE 1 TABLET BY MOUTH EVERY DAY, Disp: 90 tablet, Rfl: 1    aspirin 81 mg chewable tablet, Chew 1 tablet (81 mg total) daily Do not start before May 2, 2023., Disp: 30 tablet, Rfl: 0    atorvastatin (LIPITOR) 40 mg tablet, TAKE 1 TABLET BY MOUTH EVERY DAY, Disp: 90 tablet, Rfl: 1    Berberine Chloride (Berberine HCI) 500 MG CAPS, Take 500 capsules by mouth daily with breakfast, Disp: , Rfl:     Cholecalciferol (VITAMIN D3) 1000 units CAPS, Take 1 capsule by mouth in the morning., Disp: , Rfl:     cyanocobalamin (VITAMIN B-12) 1,000 mcg tablet, Take by mouth in the morning., Disp: , Rfl:     hydroCHLOROthiazide 12.5 mg capsule, TAKE 1 CAPSULE BY MOUTH EVERY DAY, Disp: 90 capsule, Rfl: 1    lisinopril (ZESTRIL) 40 mg tablet, TAKE 1 TABLET BY MOUTH EVERY DAY, Disp: 90 tablet, Rfl: 1    metFORMIN (GLUCOPHAGE) 1000 MG tablet, TAKE 1 TABLET BY MOUTH TWICE A DAY WITH FOOD, Disp: 180 tablet, Rfl: 1    Multiple Vitamin (MULTIVITAMIN) tablet, Take 1 tablet by mouth in the morning., Disp: , Rfl:     VITAMIN B COMPLEX-C CAPS, Take 1 capsule by mouth in the morning., Disp: , Rfl:     levocetirizine (XYZAL) 5 MG tablet, TAKE 1 TABLET BY MOUTH DAILY AS NEEDED FOR ALLERGIES., Disp: 90 tablet, Rfl: 0    Misc Natural Products (PROSTATE HEALTH PO), Take by mouth, Disp: , Rfl:     mometasone (NASONEX) 50 mcg/act nasal spray, SPRAY 2 SPRAYS INTO EACH NOSTRIL EVERY DAY, Disp: 51 g, Rfl: 0  [4]   Allergies  Allergen Reactions    Penicillins      Other reaction(s): Other (See Comments)  Unknown  Other reaction(s): Other (See Comments)  Unknown  Other reaction(s): Unknown  Category: Allergy;    [5]   Social History  Tobacco Use   Smoking Status Every Day    Current packs/day: 0.50    Types: Cigarettes    Passive exposure: Current   Smokeless Tobacco Never   [6]   Family History  Problem Relation Name Age of Onset    Diabetes Father          MELLITUS

## 2025-07-11 NOTE — ANESTHESIA PREPROCEDURE EVALUATION
Procedure:  COLONOSCOPY    Relevant Problems   CARDIO   (+) Essential hypertension   (+) Essential hypertension   (+) Hypertriglyceridemia      ENDO   (+) Diabetes 1.5, managed as type 2 (HCC)   (+) Diabetes mellitus type 2      MUSCULOSKELETAL   (+) Chronic low back pain      NEURO/PSYCH   (+) Chronic low back pain   (+) TIA (transient ischemic attack)      PULMONARY   (+) Chronic obstructive pulmonary disease, unspecified COPD type (HCC)      H/o TIA 2 years ago 2023  PFO  Stress test June 2023: EF 70%, no evidence of ischemia      Physical Exam    Airway     Mallampati score: II  TM Distance: >3 FB  Neck ROM: full  Mouth opening: >= 4 cm      Cardiovascular      Dental        Pulmonary      Neurological      Other Findings        Anesthesia Plan  ASA Score- 3     Anesthesia Type- IV sedation with anesthesia with ASA Monitors.         Additional Monitors:     Airway Plan: natural airway.           Plan Factors-    Induction- intravenous.    Postoperative Plan- .   Monitoring Plan - Monitoring plan - standard ASA monitoring      Perioperative Resuscitation Plan - Level 1 - Full Code.       Informed Consent- Anesthetic plan and risks discussed with patient.  I personally reviewed this patient with the CRNA. Discussed and agreed on the Anesthesia Plan with the CRNA..      NPO Status:  Vitals Value Taken Time   Date of last liquid 07/11/25 07/11/25 06:51   Time of last liquid 0300 07/11/25 06:51   Date of last solid 07/09/25 07/11/25 06:51   Time of last solid 1800 07/11/25 06:51          anxious

## 2025-07-17 PROCEDURE — 88305 TISSUE EXAM BY PATHOLOGIST: CPT | Performed by: PATHOLOGY

## 2025-07-29 ENCOUNTER — TELEPHONE (OUTPATIENT)
Age: 70
End: 2025-07-29

## 2025-07-30 DIAGNOSIS — R97.20 ELEVATED PSA: Primary | ICD-10-CM
